# Patient Record
Sex: FEMALE | Race: WHITE | NOT HISPANIC OR LATINO | Employment: OTHER | URBAN - METROPOLITAN AREA
[De-identification: names, ages, dates, MRNs, and addresses within clinical notes are randomized per-mention and may not be internally consistent; named-entity substitution may affect disease eponyms.]

---

## 2017-02-02 ENCOUNTER — HOSPITAL ENCOUNTER (OUTPATIENT)
Dept: ULTRASOUND IMAGING | Facility: CLINIC | Age: 77
Discharge: HOME | End: 2017-02-02
Payer: MEDICARE

## 2017-02-02 ENCOUNTER — HOSPITAL ENCOUNTER (OUTPATIENT)
Dept: MAMMOGRAPHY | Facility: CLINIC | Age: 77
Discharge: HOME | End: 2017-02-02
Payer: MEDICARE

## 2017-02-02 DIAGNOSIS — N63.0 BREAST LUMP: ICD-10-CM

## 2017-02-02 DIAGNOSIS — C50.912 MALIGNANT NEOPLASM OF LEFT FEMALE BREAST (HCC): ICD-10-CM

## 2017-02-02 PROCEDURE — 76642 ULTRASOUND BREAST LIMITED: CPT

## 2017-02-02 PROCEDURE — G0204 DX MAMMO INCL CAD BI: HCPCS

## 2017-02-15 ENCOUNTER — ALLSCRIPTS OFFICE VISIT (OUTPATIENT)
Dept: OTHER | Facility: OTHER | Age: 77
End: 2017-02-15

## 2017-02-15 PROCEDURE — G0145 SCR C/V CYTO,THINLAYER,RESCR: HCPCS | Performed by: OBSTETRICS & GYNECOLOGY

## 2017-02-17 ENCOUNTER — LAB REQUISITION (OUTPATIENT)
Dept: LAB | Facility: HOSPITAL | Age: 77
End: 2017-02-17
Payer: MEDICARE

## 2017-02-17 DIAGNOSIS — Z01.419 ENCOUNTER FOR GYNECOLOGICAL EXAMINATION WITHOUT ABNORMAL FINDING: ICD-10-CM

## 2017-02-22 LAB
LAB AP GYN PRIMARY INTERPRETATION: NORMAL
Lab: NORMAL

## 2017-02-23 ENCOUNTER — ALLSCRIPTS OFFICE VISIT (OUTPATIENT)
Dept: OTHER | Facility: OTHER | Age: 77
End: 2017-02-23

## 2017-02-23 ENCOUNTER — TRANSCRIBE ORDERS (OUTPATIENT)
Dept: ADMINISTRATIVE | Facility: HOSPITAL | Age: 77
End: 2017-02-23

## 2017-02-23 DIAGNOSIS — Z12.31 OTHER SCREENING MAMMOGRAM: Primary | ICD-10-CM

## 2017-02-24 ENCOUNTER — GENERIC CONVERSION - ENCOUNTER (OUTPATIENT)
Dept: OTHER | Facility: OTHER | Age: 77
End: 2017-02-24

## 2017-05-10 ENCOUNTER — TRANSCRIBE ORDERS (OUTPATIENT)
Dept: LAB | Facility: CLINIC | Age: 77
End: 2017-05-10

## 2017-05-10 ENCOUNTER — APPOINTMENT (OUTPATIENT)
Dept: LAB | Facility: CLINIC | Age: 77
End: 2017-05-10
Payer: MEDICARE

## 2017-05-10 DIAGNOSIS — Z79.4 DIABETES MELLITUS DUE TO UNDERLYING CONDITION WITH HYPEROSMOLARITY AND COMA, WITH LONG-TERM CURRENT USE OF INSULIN (HCC): ICD-10-CM

## 2017-05-10 DIAGNOSIS — E78.2 MIXED HYPERLIPIDEMIA: ICD-10-CM

## 2017-05-10 DIAGNOSIS — E08.01 DIABETES MELLITUS DUE TO UNDERLYING CONDITION WITH HYPEROSMOLARITY AND COMA, WITH LONG-TERM CURRENT USE OF INSULIN (HCC): ICD-10-CM

## 2017-05-10 DIAGNOSIS — E03.9 UNSPECIFIED HYPOTHYROIDISM: Primary | ICD-10-CM

## 2017-05-10 DIAGNOSIS — M06.041 SERONEGATIVE RHEUMATOID ARTHRITIS OF RIGHT HAND (HCC): ICD-10-CM

## 2017-05-10 LAB
ALBUMIN SERPL BCP-MCNC: 3.7 G/DL (ref 3.5–5)
ALP SERPL-CCNC: 107 U/L (ref 46–116)
ALT SERPL W P-5'-P-CCNC: 73 U/L (ref 12–78)
ANION GAP SERPL CALCULATED.3IONS-SCNC: 9 MMOL/L (ref 4–13)
AST SERPL W P-5'-P-CCNC: 62 U/L (ref 5–45)
BASOPHILS # BLD AUTO: 0.03 THOUSANDS/ΜL (ref 0–0.1)
BASOPHILS NFR BLD AUTO: 0 % (ref 0–1)
BILIRUB SERPL-MCNC: 0.46 MG/DL (ref 0.2–1)
BUN SERPL-MCNC: 14 MG/DL (ref 5–25)
CALCIUM SERPL-MCNC: 9.9 MG/DL (ref 8.3–10.1)
CHLORIDE SERPL-SCNC: 104 MMOL/L (ref 100–108)
CHOLEST SERPL-MCNC: 186 MG/DL (ref 50–200)
CO2 SERPL-SCNC: 29 MMOL/L (ref 21–32)
CREAT SERPL-MCNC: 0.75 MG/DL (ref 0.6–1.3)
CREAT UR-MCNC: 212 MG/DL
CRP SERPL QL: 15.5 MG/L
EOSINOPHIL # BLD AUTO: 0.13 THOUSAND/ΜL (ref 0–0.61)
EOSINOPHIL NFR BLD AUTO: 2 % (ref 0–6)
ERYTHROCYTE [DISTWIDTH] IN BLOOD BY AUTOMATED COUNT: 15.8 % (ref 11.6–15.1)
ERYTHROCYTE [SEDIMENTATION RATE] IN BLOOD: 26 MM/HOUR (ref 0–20)
EST. AVERAGE GLUCOSE BLD GHB EST-MCNC: 140 MG/DL
GFR SERPL CREATININE-BSD FRML MDRD: >60 ML/MIN/1.73SQ M
GLUCOSE P FAST SERPL-MCNC: 111 MG/DL (ref 65–99)
HBA1C MFR BLD: 6.5 % (ref 4.2–6.3)
HCT VFR BLD AUTO: 40.7 % (ref 34.8–46.1)
HDLC SERPL-MCNC: 61 MG/DL (ref 40–60)
HGB BLD-MCNC: 13.3 G/DL (ref 11.5–15.4)
LDLC SERPL CALC-MCNC: 89 MG/DL (ref 0–100)
LYMPHOCYTES # BLD AUTO: 2.97 THOUSANDS/ΜL (ref 0.6–4.47)
LYMPHOCYTES NFR BLD AUTO: 40 % (ref 14–44)
MCH RBC QN AUTO: 30.8 PG (ref 26.8–34.3)
MCHC RBC AUTO-ENTMCNC: 32.7 G/DL (ref 31.4–37.4)
MCV RBC AUTO: 94 FL (ref 82–98)
MICROALBUMIN UR-MCNC: 24.6 MG/L (ref 0–20)
MICROALBUMIN/CREAT 24H UR: 12 MG/G CREATININE (ref 0–30)
MONOCYTES # BLD AUTO: 0.49 THOUSAND/ΜL (ref 0.17–1.22)
MONOCYTES NFR BLD AUTO: 7 % (ref 4–12)
NEUTROPHILS # BLD AUTO: 3.78 THOUSANDS/ΜL (ref 1.85–7.62)
NEUTS SEG NFR BLD AUTO: 51 % (ref 43–75)
NRBC BLD AUTO-RTO: 0 /100 WBCS
PLATELET # BLD AUTO: 326 THOUSANDS/UL (ref 149–390)
PMV BLD AUTO: 10 FL (ref 8.9–12.7)
POTASSIUM SERPL-SCNC: 3.3 MMOL/L (ref 3.5–5.3)
PROT SERPL-MCNC: 7.4 G/DL (ref 6.4–8.2)
RBC # BLD AUTO: 4.32 MILLION/UL (ref 3.81–5.12)
SODIUM SERPL-SCNC: 142 MMOL/L (ref 136–145)
TRIGL SERPL-MCNC: 178 MG/DL
TSH SERPL DL<=0.05 MIU/L-ACNC: 2.11 UIU/ML (ref 0.36–3.74)
WBC # BLD AUTO: 7.42 THOUSAND/UL (ref 4.31–10.16)

## 2017-05-10 PROCEDURE — 82570 ASSAY OF URINE CREATININE: CPT

## 2017-05-10 PROCEDURE — 86140 C-REACTIVE PROTEIN: CPT

## 2017-05-10 PROCEDURE — 85025 COMPLETE CBC W/AUTO DIFF WBC: CPT

## 2017-05-10 PROCEDURE — 84443 ASSAY THYROID STIM HORMONE: CPT

## 2017-05-10 PROCEDURE — 85652 RBC SED RATE AUTOMATED: CPT

## 2017-05-10 PROCEDURE — 36415 COLL VENOUS BLD VENIPUNCTURE: CPT

## 2017-05-10 PROCEDURE — 83036 HEMOGLOBIN GLYCOSYLATED A1C: CPT

## 2017-05-10 PROCEDURE — 80053 COMPREHEN METABOLIC PANEL: CPT

## 2017-05-10 PROCEDURE — 82043 UR ALBUMIN QUANTITATIVE: CPT

## 2017-05-10 PROCEDURE — 80061 LIPID PANEL: CPT

## 2017-12-19 ENCOUNTER — TRANSCRIBE ORDERS (OUTPATIENT)
Dept: ADMINISTRATIVE | Facility: HOSPITAL | Age: 77
End: 2017-12-19

## 2017-12-19 DIAGNOSIS — R74.8 ABNORMAL LIVER ENZYMES: Primary | ICD-10-CM

## 2017-12-27 ENCOUNTER — HOSPITAL ENCOUNTER (OUTPATIENT)
Dept: RADIOLOGY | Facility: HOSPITAL | Age: 77
Discharge: HOME | End: 2017-12-27
Attending: INTERNAL MEDICINE
Payer: MEDICARE

## 2017-12-27 ENCOUNTER — GENERIC CONVERSION - ENCOUNTER (OUTPATIENT)
Dept: OTHER | Facility: OTHER | Age: 77
End: 2017-12-27

## 2017-12-27 DIAGNOSIS — R74.8 ABNORMAL LIVER ENZYMES: ICD-10-CM

## 2017-12-27 PROCEDURE — 76705 ECHO EXAM OF ABDOMEN: CPT

## 2018-01-12 VITALS
DIASTOLIC BLOOD PRESSURE: 82 MMHG | OXYGEN SATURATION: 99 % | RESPIRATION RATE: 15 BRPM | SYSTOLIC BLOOD PRESSURE: 138 MMHG | HEART RATE: 90 BPM | TEMPERATURE: 98.8 F | WEIGHT: 220 LBS | BODY MASS INDEX: 40.48 KG/M2 | HEIGHT: 62 IN

## 2018-01-13 VITALS
SYSTOLIC BLOOD PRESSURE: 138 MMHG | DIASTOLIC BLOOD PRESSURE: 74 MMHG | HEIGHT: 62 IN | WEIGHT: 220 LBS | BODY MASS INDEX: 40.48 KG/M2

## 2018-01-16 NOTE — RESULT NOTES
Verified Results  (1) THIN PREP PAP WITH IMAGING [id number] 12:00AM Colin Sánchez     Test Name Result Flag Reference   LAB AP CASE REPORT (Report)     Gynecologic Cytology Report            Case: [id number]                  Authorizing Provider: Dave Stark MD     Collected:      02/15/2017           First Screen:     VALERY Shanks   Received:      02/20/2017 1300        Specimen:  LIQUID-BASED PAP, SCREENING, Vaginal   LAB AP GYN PRIMARY INTERPRETATION      Negative for intraepithelial lesion or malignancy  Electronically signed by VALERY Shanks on 2/22/2017 at 4:25 PM   LAB AP GYN SPECIMEN ADEQUACY      Satisfactory for evaluation  LAB AP GYN ADDITIONAL INFORMATION (Report)     [employer]'s FDA approved ,  and ThinPrep Imaging System are   utilized with strict adherence to the 's instruction manual to   prepare gynecologic and non-gynecologic cytology specimens for the   production of ThinPrep slides as well as for gynecologic ThinPrep imaging  These processes have been validated by our laboratory and/or by the     The Pap test is not a diagnostic procedure and should not be used as the   sole means to detect cervical cancer  It is only a screening procedure to   aid in the detection of cervical cancer and its precursors  Both   false-negative and false-positive results have been experienced  Your   patient's test result should be interpreted in this context together with   the history and clinical findings     LAB AP LMP

## 2018-02-02 DIAGNOSIS — Z12.39 ENCOUNTER FOR OTHER SCREENING FOR MALIGNANT NEOPLASM OF BREAST: ICD-10-CM

## 2018-02-02 DIAGNOSIS — Z12.31 ENCOUNTER FOR SCREENING MAMMOGRAM FOR MALIGNANT NEOPLASM OF BREAST: ICD-10-CM

## 2018-02-05 ENCOUNTER — HOSPITAL ENCOUNTER (OUTPATIENT)
Dept: MAMMOGRAPHY | Facility: HOSPITAL | Age: 78
Discharge: HOME | End: 2018-02-05
Attending: SURGERY
Payer: MEDICARE

## 2018-02-05 DIAGNOSIS — Z12.39 ENCOUNTER FOR OTHER SCREENING FOR MALIGNANT NEOPLASM OF BREAST: ICD-10-CM

## 2018-02-05 PROCEDURE — 77067 SCR MAMMO BI INCL CAD: CPT

## 2018-02-22 ENCOUNTER — OFFICE VISIT (OUTPATIENT)
Dept: SURGICAL ONCOLOGY | Facility: CLINIC | Age: 78
End: 2018-02-22
Payer: MEDICARE

## 2018-02-22 VITALS
HEIGHT: 62 IN | RESPIRATION RATE: 18 BRPM | WEIGHT: 193.5 LBS | DIASTOLIC BLOOD PRESSURE: 84 MMHG | HEART RATE: 84 BPM | SYSTOLIC BLOOD PRESSURE: 122 MMHG | BODY MASS INDEX: 35.61 KG/M2

## 2018-02-22 DIAGNOSIS — Z12.31 SCREENING MAMMOGRAM, ENCOUNTER FOR: ICD-10-CM

## 2018-02-22 DIAGNOSIS — C50.912 ADENOCARCINOMA OF LEFT BREAST (HCC): Primary | ICD-10-CM

## 2018-02-22 PROBLEM — Z79.811 USE OF ANASTROZOLE (ARIMIDEX): Status: RESOLVED | Noted: 2018-02-22 | Resolved: 2018-02-22

## 2018-02-22 PROBLEM — Z79.811 USE OF LETROZOLE (FEMARA): Status: RESOLVED | Noted: 2018-02-22 | Resolved: 2018-02-22

## 2018-02-22 PROCEDURE — 99214 OFFICE O/P EST MOD 30 MIN: CPT | Performed by: SURGERY

## 2018-02-22 NOTE — LETTER
February 22, 2018     [address] [place] [id number]    Patient: Janelle Montesinos   YOB: 1940   Date of Visit: 2/22/2018       Dear Dr Gildardo Gonzalez:    Thank you for referring Carlus Dancer to me for evaluation  Below are my notes for this consultation  If you have questions, please do not hesitate to call me  I look forward to following your patient along with you  Sincerely,        Soham Agosto MD        CC: No Recipients  Soham Agosto MD  2/22/2018  2:33 PM  Sign at close encounter     Surgical Oncology Follow Up       [address] [place] [address]  1940  [id number]  [address]  [facility] [address] [address]    Chief Complaint   Patient presents with    Follow-up     yearly f/u        Assessment/Plan     Advance Care Planning/Advance Directives:  Did not discuss  with the patient  Adenocarcinoma of breast ([facility] )    1/11/2012 Initial Diagnosis     Adenocarcinoma of breast ([facility] )       1/16/2012 Surgery     Left breast biopsy , IDC         2/17/2012 Surgery     Left partial mastectomy, SLNB         3/19/2012 -  Radiation     PBRT  Dr Sander Tapia,  Dr Ray Butt         4/2012 -  Hormone Therapy     Anastrazole, Letrazole  Dr Martinez Soldcarlos  Pt d/c due to joint aches  History of Present Illness: breast cancer follow up  -Interval History:n/a    Review of Systems:  Review of Systems   Constitutional: Negative  Negative for appetite change and fever  Eyes: Negative  Respiratory: Negative for shortness of breath  Cardiovascular: Negative  Gastrointestinal: Negative  Endocrine: Negative  Genitourinary: Negative  Musculoskeletal: Negative  Negative for arthralgias and myalgias  Skin: Negative  Allergic/Immunologic: Negative  Neurological: Negative      Hematological: Negative  Negative for adenopathy  Does not bruise/bleed easily  Psychiatric/Behavioral: Negative  Patient Active Problem List   Diagnosis    Ad[facility] breast ([facility] )    Hx of aromatase inhibitor therapy     Past Medical History:   Diagnosis Date    Arthritis     Diabetes mellitus ([facility] )     Disease of thyroid gland     GERD (gastroesophageal reflux disease)     Headache     Hemorrhoids     Hx of radiation therapy 03/09/2012    PBRT    Hypertension     Hypertension     Night sweats     Rheumatoid arthritis (HCC)     Trigger finger     right and left hand    Urinary incontinence     Use of anastrozole (Arimidex)     Use of letrozole (Femara)      Past Surgical History:   Procedure Laterality Date    ABDOMINAL SURGERY      APPENDECTOMY      BREAST BIOPSY Left 01/162012    IDC    BREAST LUMPECTOMY Left 02/17/2012    BREAST SURGERY      CATARACT EXTRACTION      CHOLECYSTECTOMY      FOOT SURGERY      right and left foot    HYSTERECTOMY      JOINT REPLACEMENT      KNEE SURGERY      MASTECTOMY      left breast partial mastectomy    REPAIR RECTOCELE      TONSILLECTOMY      TUBAL LIGATION       Family History   Problem Relation Age of Onset    Prostate cancer Father      age at dx unk    Prostate cancer Brother      age at dx ink    Thyroid cancer Brother      age at dx unk    Thyroid cancer Daughter      age at dx unk    Pancreatic cancer Maternal Aunt      age at dx unk    Breast cancer Maternal Aunt      age at dx unk    Lung cancer Maternal Uncle      age at dx unk     Social History     Social History    Marital status: /Civil Union     Spouse name: N/A    Number of children: N/A    Years of education: N/A     Occupational History    Not on file       Social History Main Topics    Smoking status: Never Smoker    Smokeless tobacco: Never Used    Alcohol use Yes    Drug use: No    Sexual activity: Not on file     Other Topics Concern    Not on file Social History Narrative    No narrative on file       Current Outpatient Prescriptions:     amLODIPine (NORVASC) 10 mg tablet, Take 10 mg by mouth daily  , Disp: , Rfl:     calcium citrate-vitamin D (CITRACAL+D) 315-200 MG-UNIT per tablet, Take 1 tablet by mouth daily  , Disp: , Rfl:     Cholecalciferol (D3 ADULT PO), Take 2,000 Units by mouth daily  , Disp: , Rfl:     folic acid (FOLVITE) 1 mg tablet, Take 1 mg by mouth daily  , Disp: , Rfl:     furosemide (LASIX) 40 mg tablet, Take 40 mg by mouth daily  , Disp: , Rfl:     levothyroxine 75 mcg tablet, Take 88 mcg by mouth daily  , Disp: , Rfl:     losartan (COZAAR) 100 MG tablet, Take 100 mg by mouth daily  , Disp: , Rfl:     metFORMIN (GLUCOPHAGE) 1000 MG tablet, Take 1,000 mg by mouth 2 (two) times a day with meals  , Disp: , Rfl:     methotrexate 2 5 mg tablet, Take 20 mg by mouth once a week , Disp: , Rfl:     Multiple Vitamin (MULTIVITAMIN) tablet, Take 1 tablet by mouth daily  , Disp: , Rfl:   Allergies   Allergen Reactions    Penicillins     Sulfa Antibiotics        The following portions of the patient's history were reviewed and updated as appropriate: allergies, current medications, past family history, past medical history, past social history, past surgical history and problem list         Vitals:    02/22/18 1408   BP: 122/84   Pulse: 84   Resp: 18       Physical Exam   Constitutional: She is oriented to person, place, and time  She appears well-developed and well-nourished  HENT:   Head: Normocephalic and atraumatic  Cardiovascular: Normal heart sounds  Pulmonary/Chest: Breath sounds normal  Right breast exhibits no inverted nipple, no mass, no nipple discharge, no skin change and no tenderness  Left breast exhibits skin change (lumpectomy scar) and tenderness  Left breast exhibits no inverted nipple, no mass and no nipple discharge  Abdominal: Soft     Lymphadenopathy:        Right axillary: No pectoral and no lateral adenopathy present  Left axillary: No pectoral and no lateral adenopathy present  Right: No supraclavicular adenopathy present  Left: No supraclavicular adenopathy present  Neurological: She is alert and oriented to person, place, and time  Psychiatric: She has a normal mood and affect  Results:    Imaging  02/05/2018 bilateral screening mammogram is benign BI-RADS two with a density category of two    I reviewed the above  imaging data  Discussion/Summary:  27-year-old female status post left breast conservation for stage IA carcinoma  There is no evidence of disease based on examination today or recent mammogram   I will continue to see her on an annual basis or sooner should the need arise

## 2018-02-22 NOTE — PROGRESS NOTES
Surgical Oncology Follow Up       [address] [facility],[address] [place] [address]  [facility] [address] [place] [address]  1940  [id number]  [address]  [facility] [place] [id number]    Chief Complaint   Patient presents with    Follow-up     yearly f/u        Assessment/Plan     Advance Care Planning/Advance Directives:  Did not discuss  with the patient  Adenocarcinoma of breast ([place] [address] )    1/11/2012 Initial Diagnosis     Adenocarcinoma of breast ([place] [address] )       1/16/2012 Surgery     Left breast biopsy , IDC         2/17/2012 Surgery     Left partial mastectomy, SLNB         3/19/2012 -  Radiation     PBRT  Dr Judith Mejia,  Dr Julia Hearn         4/2012 -  Hormone Therapy     Anastrazole, Letrazole  Dr Felicitas So  Pt d/c due to joint aches  History of Present Illness: breast cancer follow up  -Interval History:n/a    Review of Systems:  Review of Systems   Constitutional: Negative  Negative for appetite change and fever  Eyes: Negative  Respiratory: Negative for shortness of breath  Cardiovascular: Negative  Gastrointestinal: Negative  Endocrine: Negative  Genitourinary: Negative  Musculoskeletal: Negative  Negative for arthralgias and myalgias  Skin: Negative  Allergic/Immunologic: Negative  Neurological: Negative  Hematological: Negative  Negative for adenopathy  Does not bruise/bleed easily  Psychiatric/Behavioral: Negative          Patient Active Problem List   Diagnosis    Adenocarcinoma of breast ([place] [address] )    Hx of aromatase inhibitor therapy     Past Medical History:   Diagnosis Date    Arthritis     Diabetes mellitus ([place] [address] )     Disease of thyroid gland     GERD (gastroesophageal reflux disease)     Headache     Hemorrhoids     Hx of radiation therapy 03/09/2012    PBRT    Hypertension     Hypertension     Night sweats     Rheumatoid arthritis ([facility] )     Trigger finger     right and left hand    Urinary incontinence     Use of anastrozole (Arimidex)     Use of letrozole (Femara)      Past Surgical History:   Procedure Laterality Date    ABDOMINAL SURGERY      APPENDECTOMY      BREAST BIOPSY Left 01/162012    IDC    BREAST LUMPECTOMY Left 02/17/2012    BREAST SURGERY      CATARACT EXTRACTION      CHOLECYSTECTOMY      FOOT SURGERY      right and left foot    HYSTERECTOMY      JOINT REPLACEMENT      KNEE SURGERY      MASTECTOMY      left breast partial mastectomy    REPAIR RECTOCELE      TONSILLECTOMY      TUBAL LIGATION       Family History   Problem Relation Age of Onset    Prostate cancer Father      age at dx unk    Prostate cancer Brother      age at dx ink    Thyroid cancer Brother      age at dx unk    Thyroid cancer Daughter      age at dx unk    Pancreatic cancer Maternal Aunt      age at dx unk   Onur Lemme Breast cancer Maternal Aunt      age at dx unk    Lung cancer Maternal Uncle      age at dx unk     Social History     Social History    Marital status: /Civil Union     Spouse name: N/A    Number of children: N/A    Years of education: N/A     Occupational History    Not on file  Social History Main Topics    Smoking status: Never Smoker    Smokeless tobacco: Never Used    Alcohol use Yes    Drug use: No    Sexual activity: Not on file     Other Topics Concern    Not on file     Social History Narrative    No narrative on file       Current Outpatient Prescriptions:     amLODIPine (NORVASC) 10 mg tablet, Take 10 mg by mouth daily  , Disp: , Rfl:     calcium citrate-vitamin D (CITRACAL+D) 315-200 MG-UNIT per tablet, Take 1 tablet by mouth daily  , Disp: , Rfl:     Cholecalciferol (D3 ADULT PO), Take 2,000 Units by mouth daily  , Disp: , Rfl:     folic acid (FOLVITE) 1 mg tablet, Take 1 mg by mouth daily  , Disp: , Rfl:     furosemide (LASIX) 40 mg tablet, Take 40 mg by mouth daily  , Disp: , Rfl:    levothyroxine 75 mcg tablet, Take 88 mcg by mouth daily  , Disp: , Rfl:     losartan (COZAAR) 100 MG tablet, Take 100 mg by mouth daily  , Disp: , Rfl:     metFORMIN (GLUCOPHAGE) 1000 MG tablet, Take 1,000 mg by mouth 2 (two) times a day with meals  , Disp: , Rfl:     methotrexate 2 5 mg tablet, Take 20 mg by mouth once a week , Disp: , Rfl:     Multiple Vitamin (MULTIVITAMIN) tablet, Take 1 tablet by mouth daily  , Disp: , Rfl:   Allergies   Allergen Reactions    Penicillins     Sulfa Antibiotics        The following portions of the patient's history were reviewed and updated as appropriate: allergies, current medications, past family history, past medical history, past social history, past surgical history and problem list         Vitals:    02/22/18 1408   BP: 122/84   Pulse: 84   Resp: 18       Physical Exam   Constitutional: She is oriented to person, place, and time  She appears well-developed and well-nourished  HENT:   Head: Normocephalic and atraumatic  Cardiovascular: Normal heart sounds  Pulmonary/Chest: Breath sounds normal  Right breast exhibits no inverted nipple, no mass, no nipple discharge, no skin change and no tenderness  Left breast exhibits skin change (lumpectomy scar) and tenderness  Left breast exhibits no inverted nipple, no mass and no nipple discharge  Abdominal: Soft  Lymphadenopathy:        Right axillary: No pectoral and no lateral adenopathy present  Left axillary: No pectoral and no lateral adenopathy present  Right: No supraclavicular adenopathy present  Left: No supraclavicular adenopathy present  Neurological: She is alert and oriented to person, place, and time  Psychiatric: She has a normal mood and affect  Results:    Imaging  02/05/2018 bilateral screening mammogram is benign BI-RADS two with a density category of two    I reviewed the above  imaging data      Discussion/Summary:  78-year-old female status post left breast conservation for stage IA carcinoma  There is no evidence of disease based on examination today or recent mammogram   I will continue to see her on an annual basis or sooner should the need arise

## 2018-07-11 ENCOUNTER — OFFICE VISIT (OUTPATIENT)
Dept: OBGYN CLINIC | Facility: HOSPITAL | Age: 78
End: 2018-07-11
Payer: MEDICARE

## 2018-07-11 VITALS
SYSTOLIC BLOOD PRESSURE: 127 MMHG | BODY MASS INDEX: 38.02 KG/M2 | HEART RATE: 76 BPM | DIASTOLIC BLOOD PRESSURE: 76 MMHG | WEIGHT: 206.6 LBS | HEIGHT: 62 IN

## 2018-07-11 DIAGNOSIS — M65.341 TRIGGER FINGER, RIGHT RING FINGER: ICD-10-CM

## 2018-07-11 DIAGNOSIS — M65.322 TRIGGER FINGER, LEFT INDEX FINGER: Primary | ICD-10-CM

## 2018-07-11 PROCEDURE — 99214 OFFICE O/P EST MOD 30 MIN: CPT | Performed by: ORTHOPAEDIC SURGERY

## 2018-07-11 RX ORDER — LIDOCAINE HYDROCHLORIDE AND EPINEPHRINE 10; 10 MG/ML; UG/ML
10 INJECTION, SOLUTION INFILTRATION; PERINEURAL ONCE
Status: CANCELLED | OUTPATIENT
Start: 2018-07-11 | End: 2018-07-11

## 2018-07-11 NOTE — PROGRESS NOTES
ASSESSMENT/PLAN:    Assessment:   Trigger Finger  right  ring finger and left index trigger finger    Plan:   Trigger Finger Release  left  index finger, and right ring finger    Follow Up: After Surgery    To Do Next Visit:  Sutures out    General Discussions:       Operative Discussions:  Trigger Finger Release: The anatomy and physiology of trigger finger was discussed with the patient today in the office  Edema and increased contact pressure within the flexor tendons at the A1 pulley can cause pain, crepitation, and limitation of function  Treatment options include resting MP blocking splints to decrease edema, oral anti-inflammatory medications, home or formal therapy exercises, up to 2 steroid injections or surgical release  While majority of patients do respond to conservative treatment, up to 20% may require surgical release  The patient has elected release of the trigger finger  The patient has elected to undergo a release of the A1 pulley (trigger finger)  A small incision will be made over the palmar aspect of the hand, the tendon sheath holding the flexor tendons will be released  In the postoperative period, light activities are allowed immediately, driving is allowed when narcotic medication has stopped, and the incision may get wet after 2 days  Heavy activities (lifting more than approximately 10 pounds) will be allowed after the follow up appointment in 1-2 weeks  While the pain and discomfort within the wrist typically improves rapidly, some residual discomfort may be present for up to 6 weeks  The nodule that is typically palpable in the palmar aspect of the hand will not be removed, as this would necessitate removal of a portion of the flexor tendon, however the catching, clicking, and locking should resolve  Approximate success rate is 98%  The risks and benefits of the procedure were explained to the patient, which include, but are not limited to: Bleeding, infection, recurrence, pain, scar, damage to tendons, damage to nerves, and damage to blood vessels, need for future surgery and complications related to anesthesia  If bony work is done, risks also include malunion and nonunion  These risks, along with alternative conservative treatment options, and postoperative protocols were voiced back and understood by the patient  All questions were answered to the patient's satisfaction  The patient agrees to comply with a standard postoperative protocol, and is willing to proceed  Education was provided via written and auditory forms  There were no barriers to learning  Written handouts regarding wound care, incision and scar care, and general preoperative information, as well as risks and benefits were provided to the patient       _____________________________________________________  CHIEF COMPLAINT:  Chief Complaint   Patient presents with    Right Ring Finger - Locking, Pain    Left Index Finger - Locking, Pain         SUBJECTIVE:  Shayan Adler is a 77y o  year old female who presents for follow up regarding her B/L hands due to triggering at her right ring and left index fingers  Her fingers lock at times and are very painful  She had trigger finger releases at her left small, thumb, and ring fingers about 4 yrs ago with resolution of those symptoms  Denies any numbness/tingling      PAST MEDICAL HISTORY:  Past Medical History:   Diagnosis Date    Arthritis     Diabetes mellitus ([address] )     Disease of thyroid gland     GERD (gastroesophageal reflux disease)     Headache     Hemorrhoids     Hx of radiation therapy 03/09/2012    PBRT    Hypertension     Hypertension     Night sweats     Rheumatoid arthritis (HCC)     Trigger finger     right and left hand    Urinary incontinence     Use of anastrozole (Arimidex)     Use of letrozole (Femara)        PAST SURGICAL HISTORY:  Past Surgical History:   Procedure Laterality Date    ABDOMINAL SURGERY      APPENDECTOMY  BREAST BIOPSY Left 01/162012    IDC    BREAST LUMPECTOMY Left 02/17/2012    BREAST SURGERY      CATARACT EXTRACTION      CHOLECYSTECTOMY      FOOT SURGERY      right and left foot    HYSTERECTOMY      JOINT REPLACEMENT      KNEE SURGERY      MASTECTOMY      left breast partial mastectomy    REPAIR RECTOCELE      TONSILLECTOMY      TUBAL LIGATION         FAMILY HISTORY:  Family History   Problem Relation Age of Onset    Prostate cancer Father         age at dx unk    Prostate cancer Brother         age at dx ink    Thyroid cancer Brother         age at dx unk    Thyroid cancer Daughter         age at dx unk    Pancreatic cancer Maternal Aunt         age at dx unk    Breast cancer Maternal Aunt         age at dx unk    Lung cancer Maternal Uncle         age at dx unk       SOCIAL HISTORY:  Social History   Substance Use Topics    Smoking status: Never Smoker    Smokeless tobacco: Never Used    Alcohol use Yes       MEDICATIONS:    Current Outpatient Prescriptions:     amLODIPine (NORVASC) 10 mg tablet, Take 10 mg by mouth daily  , Disp: , Rfl:     calcium citrate-vitamin D (CITRACAL+D) 315-200 MG-UNIT per tablet, Take 1 tablet by mouth daily  , Disp: , Rfl:     Cholecalciferol (D3 ADULT PO), Take 2,000 Units by mouth daily  , Disp: , Rfl:     folic acid (FOLVITE) 1 mg tablet, Take 1 mg by mouth daily  , Disp: , Rfl:     furosemide (LASIX) 40 mg tablet, Take 40 mg by mouth daily  , Disp: , Rfl:     levothyroxine 75 mcg tablet, Take 88 mcg by mouth daily  , Disp: , Rfl:     losartan (COZAAR) 100 MG tablet, Take 100 mg by mouth daily  , Disp: , Rfl:     metFORMIN (GLUCOPHAGE) 1000 MG tablet, Take 1,000 mg by mouth 2 (two) times a day with meals  , Disp: , Rfl:     methotrexate 2 5 mg tablet, Take 20 mg by mouth once a week , Disp: , Rfl:     Multiple Vitamin (MULTIVITAMIN) tablet, Take 1 tablet by mouth daily  , Disp: , Rfl:     ALLERGIES:  Allergies   Allergen Reactions    Penicillins     Sulfa Antibiotics        REVIEW OF SYSTEMS:  Pertinent items are noted in HPI  A comprehensive review of systems was negative      LABS:  HgA1c:   Lab Results   Component Value Date    HGBA1C 6 5 (H) 05/10/2017     BMP:   Lab Results   Component Value Date    CALCIUM 9 9 05/10/2017     05/10/2017    K 3 3 (L) 05/10/2017    CO2 29 05/10/2017     05/10/2017    BUN 14 05/10/2017    CREATININE 0 75 05/10/2017           _____________________________________________________  PHYSICAL EXAMINATION:  General: well developed and well nourished, alert, oriented times 3 and appears comfortable  Psychiatric: Normal  HEENT: Trachea Midline, No torticollis  Cardiovascular: No discernable arrhythmia  Pulmonary: No wheezing or stridor  Skin: No masses, erthema, lacerations, fluctation, ulcerations  Neurovascular: Sensation Intact to the Median, Ulnar, Radial Nerve, Motor Intact to the Median, Ulnar, Radial Nerve and Pulses Intact    MUSCULOSKELETAL EXAMINATION:  LEFT SIDE:  Finger:  Tenderness index finger, Triggering  index finger and Nodules  index finger  RIGHT SIDE:  Finger:  Tenderness ring, Triggering  ring finger and Nodules  index finger   She lacks terminal flexion at her left  Index finger by 3 cm and right ring finger by 4cm  NVID    _____________________________________________________  STUDIES REVIEWED:  No Studies to review      PROCEDURES PERFORMED:  Procedures  No Procedures performed today   Scribe Attestation    I,:   Michelle Perry am acting as a scribe while in the presence of the attending physician :        I,:   Alcon Schmid MD personally performed the services described in this documentation    as scribed in my presence :

## 2018-07-11 NOTE — H&P
ASSESSMENT/PLAN:    Assessment:   Trigger Finger  right  ring finger and left index trigger finger    Plan:   Trigger Finger Release  left  index finger, and right ring finger    Follow Up: After Surgery    To Do Next Visit:  Sutures out    General Discussions:       Operative Discussions:  Trigger Finger Release: The anatomy and physiology of trigger finger was discussed with the patient today in the office  Edema and increased contact pressure within the flexor tendons at the A1 pulley can cause pain, crepitation, and limitation of function  Treatment options include resting MP blocking splints to decrease edema, oral anti-inflammatory medications, home or formal therapy exercises, up to 2 steroid injections or surgical release  While majority of patients do respond to conservative treatment, up to 20% may require surgical release  The patient has elected release of the trigger finger  The patient has elected to undergo a release of the A1 pulley (trigger finger)  A small incision will be made over the palmar aspect of the hand, the tendon sheath holding the flexor tendons will be released  In the postoperative period, light activities are allowed immediately, driving is allowed when narcotic medication has stopped, and the incision may get wet after 2 days  Heavy activities (lifting more than approximately 10 pounds) will be allowed after the follow up appointment in 1-2 weeks  While the pain and discomfort within the wrist typically improves rapidly, some residual discomfort may be present for up to 6 weeks  The nodule that is typically palpable in the palmar aspect of the hand will not be removed, as this would necessitate removal of a portion of the flexor tendon, however the catching, clicking, and locking should resolve  Approximate success rate is 98%  The risks and benefits of the procedure were explained to the patient, which include, but are not limited to: Bleeding, infection, recurrence, pain, scar, damage to tendons, damage to nerves, and damage to blood vessels, need for future surgery and complications related to anesthesia  If bony work is done, risks also include malunion and nonunion  These risks, along with alternative conservative treatment options, and postoperative protocols were voiced back and understood by the patient  All questions were answered to the patient's satisfaction  The patient agrees to comply with a standard postoperative protocol, and is willing to proceed  Education was provided via written and auditory forms  There were no barriers to learning  Written handouts regarding wound care, incision and scar care, and general preoperative information, as well as risks and benefits were provided to the patient       _____________________________________________________  CHIEF COMPLAINT:  Chief Complaint   Patient presents with    Right Ring Finger - Locking, Pain    Left Index Finger - Locking, Pain         SUBJECTIVE:  Howie Vergara is a 77y o  year old female who presents for follow up regarding her B/L hands due to triggering at her right ring and left index fingers  Her fingers lock at times and are very painful  She had trigger finger releases at her left small, thumb, and ring fingers about 4 yrs ago with resolution of those symptoms  Denies any numbness/tingling      PAST MEDICAL HISTORY:  Past Medical History:   Diagnosis Date    Arthritis     Diabetes mellitus ([address] )     Disease of thyroid gland     GERD (gastroesophageal reflux disease)     Headache     Hemorrhoids     Hx of radiation therapy 03/09/2012    PBRT    Hypertension     Hypertension     Night sweats     Rheumatoid arthritis (HCC)     Trigger finger     right and left hand    Urinary incontinence     Use of anastrozole (Arimidex)     Use of letrozole (Femara)        PAST SURGICAL HISTORY:  Past Surgical History:   Procedure Laterality Date    ABDOMINAL SURGERY      APPENDECTOMY  BRE[facility]SY Left 01/162012    [facility]    BREAST LUMPECTOMY Left 02/17/2012    BREAST SURGERY      CATARACT EXTRACTION      CHOLECYSTECTOMY      FOOT SURGERY      right and left foot    HYSTERECTOMY      JOINT REPLACEMENT      KNEE SURGERY      MASTECTOMY      left breast partial mastectomy    REPAIR RECTOCELE      TONSILLECTOMY      TUBAL LIGATION         FAMILY HISTORY:  Family History   Problem Relation Age of Onset    Prostate cancer Father         age at dx unk    Prostate cancer Brother         age at dx ink    Thyroid cancer Brother         age at dx unk    Thyroid cancer Daughter         age at dx unk    Pancreatic cancer Maternal Aunt         age at dx unk    Breast cancer Maternal Aunt         age at dx unk    Lung cancer Maternal Uncle         age at dx unk       SOCIAL HISTORY:  Social History   Substance Use Topics    Smoking status: Never Smoker    Smokeless tobacco: Never Used    Alcohol use Yes       MEDICATIONS:    Current Outpatient Prescriptions:     amLODIPine (NORVASC) 10 mg tablet, Take 10 mg by mouth daily  , Disp: , Rfl:     calcium citrate-vitamin D (CITRACAL+D) 315-200 MG-UNIT per tablet, Take 1 tablet by mouth daily  , Disp: , Rfl:     Cholecalciferol (D3 ADULT PO), Take 2,000 Units by mouth daily  , Disp: , Rfl:     folic acid (FOLVITE) 1 mg tablet, Take 1 mg by mouth daily  , Disp: , Rfl:     furosemide (LASIX) 40 mg tablet, Take 40 mg by mouth daily  , Disp: , Rfl:     levothyroxine 75 mcg tablet, Take 88 mcg by mouth daily  , Disp: , Rfl:     losartan (COZAAR) 100 MG tablet, Take 100 mg by mouth daily  , Disp: , Rfl:     metFORMIN (GLUCOPHAGE) 1000 MG tablet, Take 1,000 mg by mouth 2 (two) times a day with meals  , Disp: , Rfl:     methotrexate 2 5 mg tablet, Take 20 mg by mouth once a week , Disp: , Rfl:     Multiple Vitamin (MULTIVITAMIN) tablet, Take 1 tablet by mouth daily  , Disp: , Rfl:     ALLERGIES:  Allergies   Allergen Reactions    Penicillins     Sulfa Antibiotics        REVIEW OF SYSTEMS:  Pertinent items are noted in HPI  A comprehensive review of systems was negative      LABS:  HgA1c:   Lab Results   Component Value Date    HGBA1C 6 5 (H) 05/10/2017     BMP:   Lab Results   Component Value Date    CALCIUM 9 9 05/10/2017     05/10/2017    K 3 3 (L) 05/10/2017    CO2 29 05/10/2017     05/10/2017    BUN 14 05/10/2017    CREATININE 0 75 05/10/2017           _____________________________________________________  PHYSICAL EXAMINATION:  General: well developed and well nourished, alert, oriented times 3 and appears comfortable  Psychiatric: Normal  HEENT: Trachea Midline, No torticollis  Cardiovascular: No discernable arrhythmia  Pulmonary: No wheezing or stridor  Skin: No masses, erthema, lacerations, fluctation, ulcerations  Neurovascular: Sensation Intact to the Median, Ulnar, Radial Nerve, Motor Intact to the Median, Ulnar, Radial Nerve and Pulses Intact    MUSCULOSKELETAL EXAMINATION:  LEFT SIDE:  Finger:  Tenderness index finger, Triggering  index finger and Nodules  index finger  RIGHT SIDE:  Finger:  Tenderness ring, Triggering  ring finger and Nodules  index finger   She lacks terminal flexion at her left  Index finger by 3 cm and right ring finger by 4cm  NVID    _____________________________________________________  STUDIES REVIEWED:  No Studies to review      PROCEDURES PERFORMED:  Procedures  No Procedures performed today   Scribe Attestation    I,:   Joao Ramos am acting as a scribe while in the presence of the attending physician :        I,:   Rudolph Mayfield MD personally performed the services described in this documentation    as scribed in my presence :

## 2018-08-07 ENCOUNTER — HOSPITAL ENCOUNTER (OUTPATIENT)
Facility: HOSPITAL | Age: 78
Setting detail: OUTPATIENT SURGERY
Discharge: HOME | End: 2018-08-07
Attending: ORTHOPAEDIC SURGERY | Admitting: ORTHOPAEDIC SURGERY
Payer: MEDICARE

## 2018-08-07 VITALS
RESPIRATION RATE: 16 BRPM | HEART RATE: 87 BPM | SYSTOLIC BLOOD PRESSURE: 157 MMHG | HEIGHT: 62 IN | OXYGEN SATURATION: 98 % | TEMPERATURE: 98.5 F | WEIGHT: 206 LBS | BODY MASS INDEX: 37.91 KG/M2 | DIASTOLIC BLOOD PRESSURE: 73 MMHG

## 2018-08-07 DIAGNOSIS — M65.341 TRIGGER FINGER, RIGHT RING FINGER: Primary | ICD-10-CM

## 2018-08-07 DIAGNOSIS — M65.331 TRIGGER MIDDLE FINGER OF RIGHT HAND: ICD-10-CM

## 2018-08-07 LAB — GLUCOSE SERPL-MCNC: 108 MG/DL (ref 65–140)

## 2018-08-07 PROCEDURE — 82948 REAGENT STRIP/BLOOD GLUCOSE: CPT

## 2018-08-07 PROCEDURE — 26055 INCISE FINGER TENDON SHEATH: CPT | Performed by: ORTHOPAEDIC SURGERY

## 2018-08-07 RX ORDER — MAGNESIUM HYDROXIDE 1200 MG/15ML
LIQUID ORAL AS NEEDED
Status: DISCONTINUED | OUTPATIENT
Start: 2018-08-07 | End: 2018-08-07 | Stop reason: HOSPADM

## 2018-08-07 RX ORDER — NAPROXEN 500 MG/1
500 TABLET ORAL 2 TIMES DAILY WITH MEALS
Qty: 10 TABLET | Refills: 0 | Status: SHIPPED | OUTPATIENT
Start: 2018-08-07 | End: 2019-02-28 | Stop reason: HOSPADM

## 2018-08-07 RX ORDER — BUPIVACAINE HYDROCHLORIDE AND EPINEPHRINE 5; 5 MG/ML; UG/ML
10 INJECTION, SOLUTION EPIDURAL; INTRACAUDAL; PERINEURAL ONCE
Status: CANCELLED | OUTPATIENT
Start: 2018-08-07 | End: 2018-08-07

## 2018-08-07 RX ORDER — HYDROCODONE BITARTRATE AND ACETAMINOPHEN 5; 325 MG/1; MG/1
1 TABLET ORAL EVERY 6 HOURS PRN
Qty: 10 TABLET | Refills: 0 | Status: SHIPPED | OUTPATIENT
Start: 2018-08-07 | End: 2018-08-17

## 2018-08-07 RX ORDER — SENNOSIDES 8.6 MG
650 CAPSULE ORAL EVERY 8 HOURS
Qty: 15 TABLET | Refills: 0 | Status: SHIPPED | OUTPATIENT
Start: 2018-08-07

## 2018-08-07 RX ORDER — LIDOCAINE HYDROCHLORIDE AND EPINEPHRINE 10; 10 MG/ML; UG/ML
10 INJECTION, SOLUTION INFILTRATION; PERINEURAL ONCE
Status: DISCONTINUED | OUTPATIENT
Start: 2018-08-07 | End: 2018-08-07 | Stop reason: HOSPADM

## 2018-08-07 RX ADMIN — LIDOCAINE HYDROCHLORIDE,EPINEPHRINE BITARTRATE: 10; .01 INJECTION, SOLUTION INFILTRATION; PERINEURAL at 11:33

## 2018-08-07 NOTE — OP NOTE
OPERATIVE REPORT  PATIENT NAME: Shayan Adler  :  1940  MRN: [id number]  Pt Location: [facility]    SURGERY DATE: 18    Surgeon(s) and Role:     * Selina Ram MD - Primary     * Mariano Ornelas PA-C - Assisting    Pre-Op Diagnosis:  Trigger finger, right ring finger [M65 341]    Post-Op Diagnosis Codes:     * Trigger finger, right ring finger [M65 341]    Procedure(s):  RELEASE TRIGGER FINGER - LONG FINGER AND RING FINGER (Right)    Specimen(s):  * No orders in the log *    Estimated Blood Loss:   Minimal      Anesthesia Type:   Local    Operative Indications: The patient has a history of Trigger Finger  right  long finger, ring finger that was recalcitrant to conservative management  The decision was made to bring the patient to the operating room for Trigger Finger Release  right  long finger, ring finger  Risks of the procedure were explained which include, but are not limited to bleeding; infection; damage to nerves, arteries,veins, tendons; scar; pain; need for reoperation; failure to give desired result; and risks of anaesthesia  All questions were answered to satisfaction and they were willing to proceed  Operative Findings:  Right long and ring finger trigger finger    Complications:   None    Procedure and Technique:  After the patient, site, and procedure were identified, the patient was brought into the operating room in a supine position  Local anaesthesia was adminstered in the preoperative holding area  A tourniquet was not used  The  right upper extremity was then prepped and drapped in a normal, sterile, orthopedic fashion  After the patient, site, and procedure were once again identified, attention was turned to the right long finger  An incision was made over the flexor tendon sheath at the level of the A1 pulley  Dissection was carried out in-line with the flexor tendon sheath and the radial and ulnar digital artery and nerve were protected    The A1 pulley was identified at the base of the incision  Under direct visualization, the A1 pulley was divided along the midline in its entirety with care taken to avoid injury to the underlying tendon  The tendons were examined to ensure that no further catching, popping, clicking or locking occurred with motion of the finger  The above mentioned procedure was then replicated on the right ring finger  At the completion of the procedure, hemostasis was obtained with cautery and direct pressure  The wounds were copiously irrigated with sterile solution  The wounds were closed with Prolene  Sterile dressings were applied, including Xeroform, gauze, tweeners, webril, ACE  Please note, all sponge, needle, and instrument counts were correct prior to closure  Loupe magnification was utilized  The patient tolerated the procedure well       I was present for the entire procedure and A qualified resident physician was not available    Patient Disposition:  [facility] and hemodynamically stable    SIGNATURE: Zurdo Salazar MD  DATE: 08/07/18  TIME: 12:43 PM

## 2018-08-07 NOTE — H&P (VIEW-ONLY)
ASSESSMENT/PLAN:    Assessment:   Trigger Finger  right  ring finger and left index trigger finger    Plan:   Trigger Finger Release  left  index finger, and right ring finger    Follow Up: After Surgery    To Do Next Visit:  Sutures out    General Discussions:       Operative Discussions:  Trigger Finger Release: The anatomy and physiology of trigger finger was discussed with the patient today in the office  Edema and increased contact pressure within the flexor tendons at the A1 pulley can cause pain, crepitation, and limitation of function  Treatment options include resting MP blocking splints to decrease edema, oral anti-inflammatory medications, home or formal therapy exercises, up to 2 steroid injections or surgical release  While majority of patients do respond to conservative treatment, up to 20% may require surgical release  The patient has elected release of the trigger finger  The patient has elected to undergo a release of the A1 pulley (trigger finger)  A small incision will be made over the palmar aspect of the hand, the tendon sheath holding the flexor tendons will be released  In the postoperative period, light activities are allowed immediately, driving is allowed when narcotic medication has stopped, and the incision may get wet after 2 days  Heavy activities (lifting more than approximately 10 pounds) will be allowed after the follow up appointment in 1-2 weeks  While the pain and discomfort within the wrist typically improves rapidly, some residual discomfort may be present for up to 6 weeks  The nodule that is typically palpable in the palmar aspect of the hand will not be removed, as this would necessitate removal of a portion of the flexor tendon, however the catching, clicking, and locking should resolve  Approximate success rate is 98%  The risks and benefits of the procedure were explained to the patient, which include, but are not limited to: Bleeding, infection, recurrence, pain, scar, damage to tendons, damage to nerves, and damage to blood vessels, need for future surgery and complications related to anesthesia  If bony work is done, risks also include malunion and nonunion  These risks, along with alternative conservative treatment options, and postoperative protocols were voiced back and understood by the patient  All questions were answered to the patient's satisfaction  The patient agrees to comply with a standard postoperative protocol, and is willing to proceed  Education was provided via written and auditory forms  There were no barriers to learning  Written handouts regarding wound care, incision and scar care, and general preoperative information, as well as risks and benefits were provided to the patient       _____________________________________________________  CHIEF COMPLAINT:  Chief Complaint   Patient presents with    Right Ring Finger - Locking, Pain    Left Index Finger - Locking, Pain         SUBJECTIVE:  Monie Dillard is a 77y o  year old female who presents for follow up regarding her B/L hands due to triggering at her right ring and left index fingers  Her fingers lock at times and are very painful  She had trigger finger releases at her left small, thumb, and ring fingers about 4 yrs ago with resolution of those symptoms  Denies any numbness/tingling      PAST MEDICAL HISTORY:  Past Medical History:   Diagnosis Date    Arthritis     Diabetes mellitus ([address] )     Disease of thyroid gland     GERD (gastroesophageal reflux disease)     Headache     Hemorrhoids     Hx of radiation therapy 03/09/2012    PBRT    Hypertension     Hypertension     Night sweats     Rheumatoid arthritis (HCC)     Trigger finger     right and left hand    Urinary incontinence     Use of anastrozole (Arimidex)     Use of letrozole (Femara)        PAST SURGICAL HISTORY:  Past Surgical History:   Procedure Laterality Date    ABDOMINAL SURGERY      APPENDECTOMY  [facility]162012    [facility]    BREAST LUMPECTOMY Left 02/17/2012    BREAST SURGERY      CATARACT EXTRACTION      CHOLECYSTECTOMY      FOOT SURGERY      right and left foot    HYSTERECTOMY      JOINT REPLACEMENT      KNEE SURGERY      MASTECTOMY      left breast partial mastectomy    REPAIR RECTOCELE      TONSILLECTOMY      TUBAL LIGATION         FAMILY HISTORY:  Family History   Problem Relation Age of Onset    Prostate cancer Father         age at dx unk    Prostate cancer Brother         age at dx ink    Thyroid cancer Brother         age at dx unk    Thyroid cancer Daughter         age at dx unk    Pancreatic cancer Maternal Aunt         age at dx unk    Breast cancer Maternal Aunt         age at dx unk    Lung cancer Maternal Uncle         age at dx unk       SOCIAL HISTORY:  Social History   Substance Use Topics    Smoking status: Never Smoker    Smokeless tobacco: Never Used    Alcohol use Yes       MEDICATIONS:    Current Outpatient Prescriptions:     amLODIPine (NORVASC) 10 mg tablet, Take 10 mg by mouth daily  , Disp: , Rfl:     calcium citrate-vitamin D (CITRACAL+D) 315-200 MG-UNIT per tablet, Take 1 tablet by mouth daily  , Disp: , Rfl:     Cholecalciferol (D3 ADULT PO), Take 2,000 Units by mouth daily  , Disp: , Rfl:     folic acid (FOLVITE) 1 mg tablet, Take 1 mg by mouth daily  , Disp: , Rfl:     furosemide (LASIX) 40 mg tablet, Take 40 mg by mouth daily  , Disp: , Rfl:     levothyroxine 75 mcg tablet, Take 88 mcg by mouth daily  , Disp: , Rfl:     losartan (COZAAR) 100 MG tablet, Take 100 mg by mouth daily  , Disp: , Rfl:     metFORMIN (GLUCOPHAGE) 1000 MG tablet, Take 1,000 mg by mouth 2 (two) times a day with meals  , Disp: , Rfl:     methotrexate 2 5 mg tablet, Take 20 mg by mouth once a week , Disp: , Rfl:     Multiple Vitamin (MULTIVITAMIN) tablet, Take 1 tablet by mouth daily  , Disp: , Rfl:     ALLERGIES:  Allergies   Allergen Reactions    Penicillins     Sulfa Antibiotics        REVIEW OF SYSTEMS:  Pertinent items are noted in HPI  A comprehensive review of systems was negative      LABS:  HgA1c:   Lab Results   Component Value Date    HGBA1C 6 5 (H) 05/10/2017     BMP:   Lab Results   Component Value Date    CALCIUM 9 9 05/10/2017     05/10/2017    K 3 3 (L) 05/10/2017    CO2 29 05/10/2017     05/10/2017    BUN 14 05/10/2017    CREATININE 0 75 05/10/2017           _____________________________________________________  PHYSICAL EXAMINATION:  General: well developed and well nourished, alert, oriented times 3 and appears comfortable  Psychiatric: Normal  HEENT: Trachea Midline, No torticollis  Cardiovascular: No discernable arrhythmia  Pulmonary: No wheezing or stridor  Skin: No masses, erthema, lacerations, fluctation, ulcerations  Neurovascular: Sensation Intact to the Median, Ulnar, Radial Nerve, Motor Intact to the Median, Ulnar, Radial Nerve and Pulses Intact    MUSCULOSKELETAL EXAMINATION:  LEFT SIDE:  Finger:  Tenderness index finger, Triggering  index finger and Nodules  index finger  RIGHT SIDE:  Finger:  Tenderness ring, Triggering  ring finger and Nodules  index finger   She lacks terminal flexion at her left  Index finger by 3 cm and right ring finger by 4cm  NVID    _____________________________________________________  STUDIES REVIEWED:  No Studies to review      PROCEDURES PERFORMED:  Procedures  No Procedures performed today   Scribe Attestation    I,:   Neelima Sebastian am acting as a scribe while in the presence of the attending physician :        I,:   Linda Costa MD personally performed the services described in this documentation    as scribed in my presence :

## 2018-08-07 NOTE — INTERVAL H&P NOTE
H&P updated  The patient was examined and continues with right hand ring finger trigger finger but new onset right hand long finger trigger finger  Positive nodules over both fingers with positive clicking  Decreased range of motion to ring secondary to pain  The patient will undergo trigger finger release right hand long and ring finger  Consent was obtained today    We will do this under local

## 2018-08-14 ENCOUNTER — HOSPITAL ENCOUNTER (OUTPATIENT)
Facility: HOSPITAL | Age: 78
Setting detail: OUTPATIENT SURGERY
Discharge: HOME | End: 2018-08-14
Attending: ORTHOPAEDIC SURGERY | Admitting: ORTHOPAEDIC SURGERY
Payer: MEDICARE

## 2018-08-14 VITALS
BODY MASS INDEX: 37.91 KG/M2 | DIASTOLIC BLOOD PRESSURE: 62 MMHG | WEIGHT: 206 LBS | OXYGEN SATURATION: 97 % | TEMPERATURE: 98.1 F | RESPIRATION RATE: 20 BRPM | SYSTOLIC BLOOD PRESSURE: 122 MMHG | HEIGHT: 62 IN | HEART RATE: 72 BPM

## 2018-08-14 LAB — GLUCOSE SERPL-MCNC: 92 MG/DL (ref 65–140)

## 2018-08-14 PROCEDURE — 26055 INCISE FINGER TENDON SHEATH: CPT | Performed by: ORTHOPAEDIC SURGERY

## 2018-08-14 PROCEDURE — 82948 REAGENT STRIP/BLOOD GLUCOSE: CPT

## 2018-08-14 RX ORDER — MAGNESIUM HYDROXIDE 1200 MG/15ML
LIQUID ORAL AS NEEDED
Status: DISCONTINUED | OUTPATIENT
Start: 2018-08-14 | End: 2018-08-14 | Stop reason: HOSPADM

## 2018-08-14 RX ADMIN — LIDOCAINE HYDROCHLORIDE,EPINEPHRINE BITARTRATE: 10; .01 INJECTION, SOLUTION INFILTRATION; PERINEURAL at 09:48

## 2018-08-14 NOTE — DISCHARGE INSTRUCTIONS
Post Operative Instructions    You have had surgery on your arm today, please read and follow the information below:  · Elevate your hand above your elbow during the next 24-48 hours to help with swelling  · Place your hand and arm over your head with motion at your shoulder three times a day  · Do not apply any cream/ointment/oil to your incisions including antibiotics  · Do not soak your hands in standing water (dishwater, tubs, Jacuzzi's, pools, etc ) until given permission (typically 2-3 weeks after injury)    Call the office if you notice any:  · Increased numbness or tingling of your hand or fingers that is not relieved with elevation  · Increasing pain that is not controlled with medication  · Difficulty chewing, breathing, swallowing  · Chest pains or shortness of breath  · Fever over 101 4 degrees  Bandage: Remove bandage after 5 days  Motion: Move fingers into a fist 5 times a day, DO NOT move any splinted fingers  Weight bearing status: Avoid heavy lifting (>5 pounds) with the extremity that was operated on until follow up appointment  Normal activities of daily living are OK  Ice: Ice for 10 minutes every hour as needed for swelling x 24 hours  Sling: No sling necessary  Pain medication:   Naproxen 500 mg two times a day   Tylenol Extended Release 650 mg every 8 hours  Norco/Hydrocodone one tab every 6 hours AS NEEDED for pain   Patient has meds at home  Follow-up Appointment: 7-10 days  Please call the office if you have any questions or concerns regarding your post-operative care

## 2018-08-14 NOTE — H&P
H&P Exam - Orthopedics   Christofer Amezquita 77 y o  female MRN: [id number]  Unit/Bed#: [facility] 04    Assessment/Plan   Assessment:  Left index trigger finger  Plan:  Left index trigger finger release to be performed today    History of Present Illness   HPI:  Christofer Amezquita is a 77 y o  y o  female who presents with continued complaints of left index finger pain and locking  Denies n/t       Historical Information  Review Of Systems:   · Skin: Normal  · Neuro: See HPI  · Musculoskeletal: See HPI  · 14 point review of systems negative except as stated above     Past Medical History:   Past Medical History:   Diagnosis Date    Arthritis     Diabetes mellitus ([address] )     Disease of thyroid gland     GERD (gastroesophageal reflux disease)     Headache     Hemorrhoids     Hx of radiation therapy 03/09/2012    PBRT    Hypertension     Hypertension     Night sweats     Rheumatoid arthritis (HCC)     Trigger finger     right and left hand    Urinary incontinence     Use of anastrozole (Arimidex)     Use of letrozole (Femara)        Past Surgical History:   Past Surgical History:   Procedure Laterality Date    ABDOMINAL SURGERY      APPENDECTOMY      [facility]162012    [facility]    BREAST LUMPECTOMY Left 02/17/2012    BREAST SURGERY      CATARACT EXTRACTION      CHOLECYSTECTOMY      FOOT SURGERY      right and left foot    HYSTERECTOMY      JOINT REPLACEMENT      KNEE SURGERY      MASTECTOMY      left breast partial mastectomy    DE INCISE FINGER TENDON SHEATH Right 8/7/2018    Procedure: RELEASE TRIGGER FINGER - LONG FINGER AND RING FINGER;  Surgeon: Marco Marion MD;  Location: [facility];  Service: Orthopedics    REPAIR RECTOCELE      TONSILLECTOMY      TUBAL LIGATION         Family History:  Family history reviewed and non-contributory  Family History   Problem Relation Age of Onset    Prostate cancer Father         age at dx [place] Prostate cancer Brother         age at dx ink    Thyroid cancer Brother         age at dx unk    Thyroid cancer Daughter         age at dx unk    Pancreatic cancer Maternal Aunt         age at dx unk    Breast cancer Maternal Aunt         age at dx unk    Lung cancer Maternal Uncle         age at dx unk       Social History:  Social History     Social History    Marital status: /Civil Union     Spouse name: N/A    Number of children: N/A    Years of education: N/A     Social History Main Topics    Smoking status: Never Smoker    Smokeless tobacco: Never Used    Alcohol use Yes    Drug use: No    Sexual activity: Not Asked     Other Topics Concern    None     Social History Narrative    None       Allergies: Allergies   Allergen Reactions    Penicillins     Sulfa Antibiotics            Labs:    0  Lab Value Date/Time   HCT 40 7 05/10/2017 0913   HGB 13 3 05/10/2017 0913   WBC 7 42 05/10/2017 0913   ESR 26 (H) 05/10/2017 0913   CRP 15 5 (H) 05/10/2017 0913       Meds:  No current facility-administered medications for this encounter  Blood Culture:   No results found for: BLOODCX    Wound Culture:   No results found for: WOUNDCULT    Ins and Outs:  No intake/output data recorded  Physical Exam  /76   Pulse 74   Temp 98 6 °F (37 °C) (Tympanic Core)   Resp 17   Ht 5' 1 5" (1 562 m)   Wt 93 4 kg (206 lb)   LMP  (LMP Unknown)   SpO2 96%   BMI 38 29 kg/m²   Gen: Alert and oriented to person, place, time  HEENT: EOMI, eyes clear, moist mucus membranes, hearing intact  Respiratory: Bilateral chest rise   No audible wheezing found  Cardiovascular: Regular Rate and Rhythm  Abdomen: soft nontender/nondistended  Ortho Exam: Left index finger with + nodule, + ttp A1 pulley and + triggering  Neuro Exam: Sensation and motor grossly intact    Lab Results: Reviewed  Imaging: Reviewed

## 2018-08-14 NOTE — OP NOTE
OPERATIVE REPORT  PATIENT NAME: Charanjit Daley  :  1940  MRN: [id number]  Pt Location: [facility]    SURGERY DATE: 18    Surgeon(s) and Role:     * Alcon Schmid MD - Primary     * Fabiano Arnold PA-C - Assisting    Pre-Op Diagnosis:  Trigger finger, left index finger [M65 322]    Post-Op Diagnosis Codes:     * Trigger finger, left index finger [M65 322]    Procedure(s):  RELEASE TRIGGER FINGER - LEFT INDEX FINGER (Left)    Specimen(s):  * No orders in the log *    Estimated Blood Loss:   Minimal      Anesthesia Type:   Local    Operative Indications: The patient has a history of Trigger Finger  left  index finger that was recalcitrant to conservative management  The decision was made to bring the patient to the operating room for Trigger Finger Release  left  index finger  Risks of the procedure were explained which include, but are not limited to bleeding; infection; damage to nerves, arteries,veins, tendons; scar; pain; need for reoperation; failure to give desired result; and risks of anaesthesia  All questions were answered to satisfaction and they were willing to proceed  Operative Findings:  Left index finger trigger finger    Complications:   None    Procedure and Technique:  After the patient, site, and procedure were identified, the patient was brought into the operating room in a supine position  Local anaesthesia was adminstered in the preoperative holding area  A tourniquet was not used  The  left upper extremity was then prepped and drapped in a normal, sterile, orthopedic fashion  After the patient, site, and procedure were once again identified, attention was turned to the left index finger  An incision was made over the flexor tendon sheath at the level of the A1 pulley  Dissection was carried out in-line with the flexor tendon sheath and the radial and ulnar digital artery and nerve were protected  The A1 pulley was identified at the base of the incision  Under direct visualization, the A1 pulley was divided along the midline in its entirety with care taken to avoid injury to the underlying tendon  The tendons were examined to ensure that no further catching, popping, clicking or locking occurred with motion of the finger  At the completion of the procedure, hemostasis was obtained with cautery and direct pressure  The wounds were copiously irrigated with sterile solution  The wounds were closed with Prolene  Sterile dressings were applied, including Xeroform, gauze, tweeners, webril, ACE  Please note, all sponge, needle, and instrument counts were correct prior to closure  Loupe magnification was utilized  The patient tolerated the procedure well       I was present for the entire procedure and A qualified resident physician was not available    Patient Disposition:  APU and hemodynamically stable    SIGNATURE: Maranda Thomas MD  DATE: 08/14/18  TIME: 10:36 AM

## 2018-08-22 ENCOUNTER — OFFICE VISIT (OUTPATIENT)
Dept: OBGYN CLINIC | Facility: HOSPITAL | Age: 78
End: 2018-08-22

## 2018-08-22 VITALS
DIASTOLIC BLOOD PRESSURE: 68 MMHG | HEART RATE: 80 BPM | HEIGHT: 62 IN | BODY MASS INDEX: 39.05 KG/M2 | WEIGHT: 212.2 LBS | SYSTOLIC BLOOD PRESSURE: 122 MMHG

## 2018-08-22 DIAGNOSIS — Z98.890 S/P TRIGGER FINGER RELEASE: Primary | ICD-10-CM

## 2018-08-22 DIAGNOSIS — M62.40 INTRINSIC MUSCLE TIGHTNESS: ICD-10-CM

## 2018-08-22 PROBLEM — M65.322 TRIGGER FINGER, LEFT INDEX FINGER: Status: RESOLVED | Noted: 2018-07-11 | Resolved: 2018-08-22

## 2018-08-22 PROBLEM — M65.341 TRIGGER FINGER, RIGHT RING FINGER: Status: RESOLVED | Noted: 2018-07-11 | Resolved: 2018-08-22

## 2018-08-22 PROBLEM — M65.331 TRIGGER MIDDLE FINGER OF RIGHT HAND: Status: RESOLVED | Noted: 2018-08-07 | Resolved: 2018-08-22

## 2018-08-22 PROCEDURE — 99024 POSTOP FOLLOW-UP VISIT: CPT | Performed by: PHYSICIAN ASSISTANT

## 2018-08-22 NOTE — PROGRESS NOTES
Assessment:   S/P left index TFR 8/14/18 and right long and ring TFR 8/7/18  Intrinsic tightness right ring finger    Plan:   Resume activities as tolerated, scar tissue massage and therapy for ring finger intrinsic tightness    Follow Up:  6  week(s) if needed      CHIEF COMPLAINT:  Chief Complaint   Patient presents with    Left Index Finger - Post-op         SUBJECTIVE:  Charanjit Daley is a 77y o  year old female who presents for follow up S/P left index TFR 8/14/18 and right long and ring TFR 8/7/18  Patient states overall she is doing well, but has discomfort and stiffness to the right ring finger  No clicking/catching  PHYSICAL EXAMINATION:  General: well developed and well nourished, alert, oriented times 3 and appears comfortable  Psychiatric: Normal    MUSCULOSKELETAL EXAMINATION:  Incision: Clean, dry, intact  Range of Motion: As expected for left index and right long fingers   Right ring finger noted to have + intrinsic tightness on exam with tenderness when performing this test  Neurovascular status: Neuro intact, good cap refill  Activity Restrictions: No restrictions  Done today: Sutures out      STUDIES REVIEWED:  No Studies to review      PROCEDURES PERFORMED:  Procedures  No Procedures performed today

## 2018-08-23 ENCOUNTER — EVALUATION (OUTPATIENT)
Dept: OCCUPATIONAL THERAPY | Facility: CLINIC | Age: 78
End: 2018-08-23
Payer: MEDICARE

## 2018-08-23 DIAGNOSIS — Z98.890 S/P TRIGGER FINGER RELEASE: Primary | ICD-10-CM

## 2018-08-23 PROCEDURE — G8984 CARRY CURRENT STATUS: HCPCS | Performed by: OCCUPATIONAL THERAPIST

## 2018-08-23 PROCEDURE — G8985 CARRY GOAL STATUS: HCPCS | Performed by: OCCUPATIONAL THERAPIST

## 2018-08-23 PROCEDURE — 97140 MANUAL THERAPY 1/> REGIONS: CPT | Performed by: OCCUPATIONAL THERAPIST

## 2018-08-23 PROCEDURE — 97165 OT EVAL LOW COMPLEX 30 MIN: CPT | Performed by: OCCUPATIONAL THERAPIST

## 2018-08-23 NOTE — PROGRESS NOTES
OT Evaluation     Today's date: 2018  Patient name: Michelle Lofton  : 1940  MRN: [id number]  Referring provider: Pari Sahu PA-C  Dx:   Encounter Diagnosis     ICD-10-CM    1  S/P trigger finger release Z98 890                   Assessment  Impairments: abnormal or restricted ROM, impaired physical strength and pain with function    Assessment details: Post R LF and RF trigger releases on 18, and L IF trigger release on 18  She has had many previous TFR's bilaterally  Therapy will focus on the R hand, as she states there is minimal impairment or pain regarding the L  We will continue to monitor the progress of the L IF as she continues the HEP at home  She presents with intrinsic tightness in the R RF, decreased FDS gliding, edema, and pain  See below for a detailed assessment  Understanding of Dx/Px/POC: good   Prognosis: good    Goals  STG: Patient will be compliant with post operative precautions (if applicable) and home exercise program in 2 weeks  STG: Pain will be reduced by two subjective levels in 2 weeks  STG: Inflammation/edema/joint effusion will be reduced by 25% and patient will be independent with self management techniques in 4 weeks  STG: Range of motion of middle finger and ring finger will be improved by 25% in 4 weeks  STG: Strength will be improved by 25% in 4 weeks  LTG: Pain will be reduced by 4 subjective levels in 6-8 weeks  LTG: Performance in ADLs and IADLS will be improved to prior level of function with the affected extremity within 8 weeks  LTG: Performance in work/school activity will be improved to prior level of function with the affected extremity within 8 weeks  LTG: FOTO score increase by 20 points within 8 weeks         Plan  Patient would benefit from: skilled OT and OT eval  Planned modality interventions: thermotherapy: hydrocollator packs and thermotherapy: paraffin bath  Planned therapy interventions: functional ROM exercises, home exercise program, joint mobilization, manual therapy, therapeutic exercise, patient education, stretching and strengthening  Frequency: 2x week  Duration in weeks: 6  Treatment plan discussed with: patient  Plan details: Treatment to include modalities, manual therapy, PRE's, HEP, and orthotics as appropriate  Subjective Evaluation    History of Present Illness  Mechanism of injury: Post R LF and RF trigger releases on 18, and L IF trigger release on 18  She has had many previous TFR's bilaterally  She states symptoms for 6-7 months prior to surgery  She is primarily concerned regarding the R RF stiffness  Pain  At best pain ratin  At worst pain ratin  Quality: discomfort and tight    Hand dominance: right    Patient Goals  Patient goals for therapy: decreased pain, increased motion and increased strength          Objective     Observations     Additional Observation Details  Well healing incisions  Active Range of Motion     Left Digits    Flexion   Index     MCP: 60    PIP: 80    DIP: 45  Extension   Index     MCP: -10    Right Digits   Flexion   Middle     MCP: 70    PIP: 80    DIP: 40  Ring     MCP: 51    PIP: 68    DIP: 28  Extension   Middle     MCP: -20  Ring     MCP: -20    Strength/Myotome Testing     Left Wrist/Hand      (2nd hand position)     Trial 1: 30    Right Wrist/Hand      (2nd hand position)     Trial 1: 22    Additional Strength Details  Well healing incisions  Tests     Right Wrist/Hand   Positive extrinsic extensor tightness and intrinsic muscle tightness       Swelling     Left Wrist/Hand   Index     Proximal: 7 cm    Middle: 5 6 cm    Right Wrist/Hand   Middle     Proximal: 6 7 cm    Middle: 5 6 cm  Ring     Proximal: 6 3 cm    Middle: 5 2 cm      Flowsheet Rows      Most Recent Value   PT/OT G-Codes   Current Score  72   Projected Score  78   FOTO information reviewed  Yes   Assessment Type  Evaluation   G code set  Carrying, Moving & Handling Objects   Carrying, Moving and Handling Objects Current Status ()  CJ   Carrying, Moving and Handling Objects Goal Status ()  Mendel Art

## 2018-08-23 NOTE — PROGRESS NOTES
Daily Note     Today's date: 2018  Patient name: Mark Dillon  : 1940  MRN: [id number]  Referring provider: Joseph Harper PA-C  Dx:   Encounter Diagnosis     ICD-10-CM    1  S/P trigger finger release [id number] [id number]                   Subjective: See eval        Objective: See treatment diary below      Assessment: Tolerated treatment well  Patient would benefit from continued OT      Plan: Continue per plan of care       Precautions: [place]    Specialty Daily Treatment Diary     Manual         IASTM FDS 5'       Intrinsic stretching 5'       Place and hold composite grasp 5'                           Exercise Diary         HEP: Blocking, TG, FDS stretching, intrinsic stretching  Issued                                                                                                                                                                   Modalities        MHP 10'

## 2018-08-28 ENCOUNTER — APPOINTMENT (OUTPATIENT)
Dept: OCCUPATIONAL THERAPY | Facility: CLINIC | Age: 78
End: 2018-08-28
Payer: MEDICARE

## 2018-09-05 ENCOUNTER — OFFICE VISIT (OUTPATIENT)
Dept: OCCUPATIONAL THERAPY | Facility: CLINIC | Age: 78
End: 2018-09-05
Payer: MEDICARE

## 2018-09-05 DIAGNOSIS — Z98.890 S/P TRIGGER FINGER RELEASE: Primary | ICD-10-CM

## 2018-09-05 PROCEDURE — G8991 OTHER PT/OT GOAL STATUS: HCPCS | Performed by: OCCUPATIONAL THERAPIST

## 2018-09-05 PROCEDURE — 97140 MANUAL THERAPY 1/> REGIONS: CPT

## 2018-09-05 PROCEDURE — G8990 OTHER PT/OT CURRENT STATUS: HCPCS | Performed by: OCCUPATIONAL THERAPIST

## 2018-09-05 PROCEDURE — 97110 THERAPEUTIC EXERCISES: CPT

## 2018-09-05 NOTE — PROGRESS NOTES
Daily Note     Today's date: 2018  Patient name: Dusty Shi  : 1940  MRN: [id number]  Referring provider: Doreen Quintanilla PA-C  Dx:   Encounter Diagnosis   Name Primary?  S/P trigger finger release Yes                  Subjective: "Do I need to keep coming?"      Objective: See treatment diary below     Manual           IASTM FDS 5'  10'         Intrinsic stretching 5'  5'         Place and hold composite grasp 5'  5'                                           Exercise Diary           HEP: Blocking, TG, FDS stretching, intrinsic stretching  Issued            keyegs    1x each hand          pegs in with hook grasp    1x each hand                                                                                                                                                                                                                                                             Modalities          MHP 10'  10'                                           Assessment: Tolerated treatment well  Noted with minimal stiffness in right digits and had c/o of decreased range of right D4  Noted with scar tissue on left which patient reports she massages at home  WALSH provided soft tissue massage to area  No pain or complaints during activities  Patient requested to be done with therapy and remove appointments as she feels her range is functional  Educated that patient would need to discuss with evaluating OTR regarding POC        Plan: Continued skilled OT per POC    INTERVENTION COMMENTS:  Diagnosis: S/P trigger finger release [Z98 890]  Precautions: [place]  FOTO:  2 of 10 visits, PN due

## 2018-09-12 ENCOUNTER — APPOINTMENT (OUTPATIENT)
Dept: OCCUPATIONAL THERAPY | Facility: CLINIC | Age: 78
End: 2018-09-12
Payer: MEDICARE

## 2018-10-17 ENCOUNTER — OFFICE VISIT (OUTPATIENT)
Dept: OBGYN CLINIC | Facility: HOSPITAL | Age: 78
End: 2018-10-17

## 2018-10-17 VITALS
HEIGHT: 62 IN | DIASTOLIC BLOOD PRESSURE: 78 MMHG | WEIGHT: 225.8 LBS | HEART RATE: 79 BPM | SYSTOLIC BLOOD PRESSURE: 124 MMHG | BODY MASS INDEX: 41.55 KG/M2

## 2018-10-17 DIAGNOSIS — Z98.890 S/P TRIGGER FINGER RELEASE: Primary | ICD-10-CM

## 2018-10-17 PROCEDURE — 99024 POSTOP FOLLOW-UP VISIT: CPT | Performed by: ORTHOPAEDIC SURGERY

## 2018-10-17 RX ORDER — LEVOTHYROXINE SODIUM 88 MCG
TABLET ORAL
COMMUNITY
Start: 2018-09-26

## 2019-02-06 ENCOUNTER — HOSPITAL ENCOUNTER (OUTPATIENT)
Dept: MAMMOGRAPHY | Facility: HOSPITAL | Age: 79
Discharge: HOME | End: 2019-02-06
Attending: SURGERY
Payer: MEDICARE

## 2019-02-06 VITALS — WEIGHT: 225 LBS | HEIGHT: 62 IN | BODY MASS INDEX: 41.41 KG/M2

## 2019-02-06 DIAGNOSIS — Z12.31 SCREENING MAMMOGRAM, ENCOUNTER FOR: ICD-10-CM

## 2019-02-06 PROCEDURE — 77067 SCR MAMMO BI INCL CAD: CPT

## 2019-02-28 ENCOUNTER — OFFICE VISIT (OUTPATIENT)
Dept: SURGICAL ONCOLOGY | Facility: CLINIC | Age: 79
End: 2019-02-28
Payer: MEDICARE

## 2019-02-28 VITALS
DIASTOLIC BLOOD PRESSURE: 70 MMHG | HEIGHT: 62 IN | WEIGHT: 232 LBS | RESPIRATION RATE: 16 BRPM | BODY MASS INDEX: 42.69 KG/M2 | HEART RATE: 86 BPM | SYSTOLIC BLOOD PRESSURE: 110 MMHG | TEMPERATURE: 98.1 F

## 2019-02-28 DIAGNOSIS — Z85.3 PERSONAL HISTORY OF BREAST CANCER: Primary | ICD-10-CM

## 2019-02-28 DIAGNOSIS — Z85.3 ENCOUNTER FOR FOLLOW-UP SURVEILLANCE OF BREAST CANCER: ICD-10-CM

## 2019-02-28 DIAGNOSIS — Z12.31 SCREENING MAMMOGRAM, ENCOUNTER FOR: ICD-10-CM

## 2019-02-28 DIAGNOSIS — Z08 ENCOUNTER FOR FOLLOW-UP SURVEILLANCE OF BREAST CANCER: ICD-10-CM

## 2019-02-28 PROCEDURE — 99213 OFFICE O/P EST LOW 20 MIN: CPT | Performed by: SURGERY

## 2019-02-28 NOTE — PROGRESS NOTES
Surgical Oncology Follow Up       [address] [facility],[address] [place] [address]  [facility] [address] [facility]  1940  [id number]  [address]  [facility] [address] [address]    Chief Complaint   Patient presents with    Follow-up     1 year breast        Assessment/Plan   Diagnoses and all orders for this visit:    Personal history of breast cancer    Screening mammogram, encounter for  -     Mammo screening bilateral w cad; Future    Encounter for follow-up surveillance of breast cancer        Advance Care Planning/Advance Directives:  Discussed disease status, cancer treatment plans and/or cancer treatment goals with the patient  Oncology History:       Personal history of breast cancer    1/11/2012 Initial Diagnosis     Adenocarcinoma of breast ([facility] [address] )         1/16/2012 Surgery     Left breast biopsy , [facility]         2/17/2012 Surgery     Left partial mastectomy, [facility]         3/19/2012 -  Radiation     PBRT  Dr Theodor Ormond,  Dr Warren          4/2012 -  Hormone Therapy     Anastrazole, Letrazole  Dr Naa Curry  Pt d/c due to joint aches  History of Present Illness:  Breast cancer follow-up  -Interval History:  None    Review of Systems:  Review of Systems   Constitutional: Negative  Negative for appetite change and fever  Eyes: Negative  Respiratory: Negative for shortness of breath  Cardiovascular: Negative  Gastrointestinal: Negative  Endocrine: Negative  Genitourinary: Negative  Musculoskeletal: Positive for arthralgias (arthritis)  Negative for myalgias  Skin: Negative  Allergic/Immunologic: Negative  Neurological: Negative  Hematological: Negative  Negative for adenopathy  Does not bruise/bleed easily  Psychiatric/Behavioral: Negative          Patient Active Problem List   Diagnosis    Personal history of breast cancer    Screening mammogram, encounter for    S/P trigger finger release    Intrinsic muscle tightness    Encounter for follow-up surveillance of breast cancer     Past Medical History:   Diagnosis Date    Arthritis     Diabetes mellitus ([address] )     Disease of thyroid gland     GERD (gastroesophageal reflux disease)     Headache     Hemorrhoids     Hx of radiation therapy 03/09/2012    PBRT    Hypertension     Hypertension     Night sweats     Rheumatoid arthritis (HCC)     Trigger finger     right and left hand    Urinary incontinence     Use of anastrozole (Arimidex)     Use of letrozole (Femara)      Past Surgical History:   Procedure Laterality Date    ABDOMINAL SURGERY      APPENDECTOMY      BREAST BIOPSY Left 01/162012    [facility]    BREAST LUMPECTOMY Left 02/17/2012    BREAST SURGERY      CATARACT EXTRACTION      CHOLECYSTECTOMY      FOOT SURGERY      right and left foot    HYSTERECTOMY      JOINT REPLACEMENT      KNEE SURGERY      MASTECTOMY      left breast partial mastectomy    CT INCISE FINGER TENDON SHEATH Right 8/7/2018    Procedure: RELEASE TRIGGER FINGER - LONG FINGER AND RING FINGER;  Surgeon: Edmundo Olmstead MD;  Location: [facility];  Service: Orthopedics    CT INCISE FINGER TENDON SHEATH Left 8/14/2018    Procedure: RELEASE TRIGGER FINGER - LEFT INDEX FINGER;  Surgeon: Edmundo Olmstead MD;  Location[facility];  Service: Orthopedics    REPAIR RECTOCELE      TONSILLECTOMY      TUBAL LIGATION       Family History   Problem Relation Age of Onset    Prostate cancer Father         age at dx unk    Prostate cancer Brother         age at dx ink    Thyroid cancer Brother         age at dx unk    Thyroid cancer Daughter         age at dx unk    Pancreatic cancer Maternal Aunt         age at dx unk    Breast cancer Maternal Aunt 71        age at dx unk    Lung cancer Maternal Uncle         age at dx unk     Social History     Socioeconomic History    Marital status: /Civil Union     Spouse name: Not on file    Number of children: Not on file    Years of education: Not on file    Highest education level: Not on file   Occupational History    Not on file   Social Needs    Financial resource strain: Not on file    Food insecurity:     Worry: Not on file     Inability: Not on file    Transportation needs:     Medical: Not on file     Non-medical: Not on file   Tobacco Use    Smoking status: Never Smoker    Smokeless tobacco: Never Used   Substance and Sexual Activity    Alcohol use: Yes    Drug use: No    Sexual activity: Not on file   Lifestyle    Physical activity:     Days per week: Not on file     Minutes per session: Not on file    Stress: Not on file   Relationships    Social connections:     Talks on phone: Not on file     Gets together: Not on file     Attends Oriental orthodox service: Not on file     Active member of club or organization: Not on file     Attends meetings of clubs or organizations: Not on file     Relationship status: Not on file    Intimate partner violence:     Fear of current or ex partner: Not on file     Emotionally abused: Not on file     Physically abused: Not on file     Forced sexual activity: Not on file   Other Topics Concern    Not on file   Social History Narrative    Not on file       Current Outpatient Medications:     acetaminophen (TYLENOL 8 HOUR) 650 mg CR tablet, Take 1 tablet (650 mg total) by mouth every 8 (eight) hours, Disp: 15 tablet, Rfl: 0    amLODIPine (NORVASC) 10 mg tablet, Take 10 mg by mouth daily  , Disp: , Rfl:     calcium citrate-vitamin D (CITRACAL+D) 315-200 MG-UNIT per tablet, Take 1 tablet by mouth daily  , Disp: , Rfl:     furosemide (LASIX) 40 mg tablet, Take 40 mg by mouth daily  , Disp: , Rfl:     losartan (COZAAR) 100 MG tablet, Take 100 mg by mouth daily  , Disp: , Rfl:     metFORMIN (GLUCOPHAGE) 1000 MG tablet, Take 1,000 mg by mouth 2 (two) times a day with meals  , Disp: , Rfl:     Multiple Vitamin (MULTIVITAMIN) tablet, Take 1 tablet by mouth daily  , Disp: , Rfl:     SYNTHROID 88 MCG tablet, , Disp: , Rfl:   Allergies   Allergen Reactions    Sulfa Antibiotics Rash    Penicillins Rash       The following portions of the patient's history were reviewed and updated as appropriate: allergies, current medications, past family history, past medical history, past social history, past surgical history and problem list         Vitals:    02/28/19 1419   BP: 110/70   Pulse: 86   Resp: 16   Temp: 98 1 °F (36 7 °C)       Physical Exam   Constitutional: She is oriented to person, place, and time  She appears well-developed and well-nourished  HENT:   Head: Normocephalic and atraumatic  Cardiovascular: Normal heart sounds  Pulmonary/Chest: Breath sounds normal  Right breast exhibits no inverted nipple, no mass, no nipple discharge, no skin change and no tenderness  Left breast exhibits skin change (Lumpectomy scar)  Left breast exhibits no inverted nipple, no mass, no nipple discharge and no tenderness  Abdominal: Soft  Lymphadenopathy:        Right axillary: No pectoral and no lateral adenopathy present  Left axillary: No pectoral and no lateral adenopathy present  Right: No supraclavicular adenopathy present  Left: No supraclavicular adenopathy present  Neurological: She is alert and oriented to person, place, and time  Psychiatric: She has a normal mood and affect  Results:      Imaging  02/06/2019 bilateral screening mammogram is benign BI-RADS two with a density of two    I reviewed the above imaging data  Discussion/Summary:  76-year-old female status post left breast conservation for stage IA invasive ductal carcinoma  She completed adjuvant radiation and an aromatase inhibitor  There is no evidence of disease based on examination today  Her recent mammogram was also benign    I will see her again in one year for another exam following her mammogram or sooner should the need arise

## 2019-03-18 ENCOUNTER — TRANSCRIBE ORDERS (OUTPATIENT)
Dept: ADMINISTRATIVE | Facility: HOSPITAL | Age: 79
End: 2019-03-18

## 2019-03-18 DIAGNOSIS — M79.89 LEG SWELLING: Primary | ICD-10-CM

## 2019-03-19 ENCOUNTER — HOSPITAL ENCOUNTER (OUTPATIENT)
Dept: NON INVASIVE DIAGNOSTICS | Facility: CLINIC | Age: 79
Discharge: HOME | End: 2019-03-19
Payer: MEDICARE

## 2019-03-19 DIAGNOSIS — M79.89 LEG SWELLING: ICD-10-CM

## 2019-03-19 PROCEDURE — 93970 EXTREMITY STUDY: CPT

## 2019-03-19 PROCEDURE — 93970 EXTREMITY STUDY: CPT | Performed by: SURGERY

## 2020-02-07 ENCOUNTER — HOSPITAL ENCOUNTER (OUTPATIENT)
Dept: MAMMOGRAPHY | Facility: HOSPITAL | Age: 80
Discharge: HOME | End: 2020-02-07
Attending: SURGERY
Payer: MEDICARE

## 2020-02-07 VITALS — BODY MASS INDEX: 42.69 KG/M2 | WEIGHT: 232 LBS | HEIGHT: 62 IN

## 2020-02-07 DIAGNOSIS — Z12.31 SCREENING MAMMOGRAM, ENCOUNTER FOR: ICD-10-CM

## 2020-02-07 PROCEDURE — 77067 SCR MAMMO BI INCL CAD: CPT

## 2020-03-12 ENCOUNTER — OFFICE VISIT (OUTPATIENT)
Dept: SURGICAL ONCOLOGY | Facility: CLINIC | Age: 80
End: 2020-03-12
Payer: MEDICARE

## 2020-03-12 VITALS
WEIGHT: 222.5 LBS | HEIGHT: 62 IN | TEMPERATURE: 97.1 F | SYSTOLIC BLOOD PRESSURE: 138 MMHG | DIASTOLIC BLOOD PRESSURE: 80 MMHG | RESPIRATION RATE: 18 BRPM | HEART RATE: 97 BPM | BODY MASS INDEX: 40.94 KG/M2

## 2020-03-12 DIAGNOSIS — Z08 ENCOUNTER FOR FOLLOW-UP SURVEILLANCE OF BREAST CANCER: Primary | ICD-10-CM

## 2020-03-12 DIAGNOSIS — Z85.3 PERSONAL HISTORY OF BREAST CANCER: ICD-10-CM

## 2020-03-12 DIAGNOSIS — Z85.3 ENCOUNTER FOR FOLLOW-UP SURVEILLANCE OF BREAST CANCER: Primary | ICD-10-CM

## 2020-03-12 DIAGNOSIS — Z12.31 SCREENING MAMMOGRAM, ENCOUNTER FOR: ICD-10-CM

## 2020-03-12 PROCEDURE — 99213 OFFICE O/P EST LOW 20 MIN: CPT | Performed by: SURGERY

## 2020-03-12 NOTE — PROGRESS NOTES
Surgical Oncology Follow Up       [address] [facility] [place]  [address] [facility] [address]  [place] [address]    Tarri Sine  1940  [id number]  [address] [facility] [place]  [address] [place] [address]    Chief Complaint   Patient presents with    Follow-up       Assessment/Plan   Diagnoses and all orders for this visit:    Encounter for follow-up surveillance of breast cancer    Screening mammogram, encounter for  -     Mammo screening bilateral w 3d & cad; Future    Personal history of breast cancer        Advance Care Planning/Advance Directives:  Discussed disease status, cancer treatment plans and/or cancer treatment goals with the patient  Oncology History:       Personal history of breast cancer    1/11/2012 Initial Diagnosis     Adenocarcinoma of breast ([facility] [address] )      1/16/2012 Surgery     Left breast biopsy , [facility]      2/17/2012 Surgery     Left partial mastectomy, [facility]      3/19/2012 -  Radiation     PBRT  Dr Josee Norwood,  Dr Chani Martin      4/2012 -  Hormone Therapy     Anastrazole, Letrazole  Dr Angel Daugherty  Pt d/c due to joint aches  History of Present Illness:  Breast cancer follow-up, no concerns  -Interval History:  Recent mammogram    Review of Systems:  Review of Systems   Constitutional: Negative  Negative for appetite change and fever  Eyes: Negative  Respiratory: Negative for shortness of breath  Cardiovascular: Negative  Gastrointestinal: Negative  Endocrine: Negative  Genitourinary: Negative  Musculoskeletal: Negative  Negative for arthralgias and myalgias  Skin: Negative  Allergic/Immunologic: Negative  Neurological: Negative  Hematological: Negative  Negative for adenopathy  Does not bruise/bleed easily  Psychiatric/Behavioral: Negative          Patient Active Problem List   Diagnosis    Personal history of breast cancer    Screening mammogram, encounter for  S/P trigger finger release    Intrinsic muscle tightness    Encounter for follow-up surveillance of breast cancer     Past Medical History:   Diagnosis Date    Arthritis     Diabetes mellitus ([address] )     Disease of thyroid gland     GERD (gastroesophageal reflux disease)     Headache     Hemorrhoids     Hx of radiation therapy 03/09/2012    PBRT    Hypertension     Hypertension     Night sweats     Rheumatoid arthritis (HCC)     Trigger finger     right and left hand    Urinary incontinence     Use of anastrozole (Arimidex)     Use of letrozole (Femara)      Past Surgical History:   Procedure Laterality Date    ABDOMINAL SURGERY      APPENDECTOMY      BREAST BIOPSY Left 01/162012    [facility]    BREAST LUMPECTOMY Left 02/17/2012    BREAST SURGERY      CATARACT EXTRACTION      CHOLECYSTECTOMY      FOOT SURGERY      right and left foot    HYSTERECTOMY      JOINT REPLACEMENT      KNEE SURGERY      ID INCISE FINGER TENDON SHEATH Right 8/7/2018    Procedure: RELEASE TRIGGER FINGER - LONG FINGER AND RING FINGER;  Surgeon: Camelia Burris MD;  [facility];  Service: Orthopedics    ID INCISE FINGER TENDON SHEATH Left 8/14/2018    Procedure: RELEASE TRIGGER FINGER - LEFT INDEX FINGER;  Surgeon: Camelia Burris MD;  [facility];  Service: Orthopedics    REPAIR RECTOCELE      TONSILLECTOMY      TUBAL LIGATION       Family History   Problem Relation Age of Onset    Prostate cancer Father         age at dx unk    Prostate cancer Brother 68    Thyroid cancer Brother         age at dx unk    Thyroid cancer Daughter 39        age at dx unk    Breast cancer Maternal Aunt 71        age at dx unk    Lung cancer Maternal Uncle         age at dx unk    No Known Problems Son     No Known Problems Son     No Known Problems Son     Pancreatic cancer Maternal Aunt         unknown age     Social History     Socioeconomic History    Marital status: /Civil Union     Spouse name: Not on file    Number of children: Not on file    Years of education: Not on file    Highest education level: Not on file   Occupational History    Not on file   Social Needs    Financial resource strain: Not on file    Food insecurity:     Worry: Not on file     Inability: Not on file    Transportation needs:     Medical: Not on file     Non-medical: Not on file   Tobacco Use    Smoking status: Never Smoker    Smokeless tobacco: Never Used   Substance and Sexual Activity    Alcohol use: Yes    Drug use: No    Sexual activity: Not on file   Lifestyle    Physical activity:     Days per week: Not on file     Minutes per session: Not on file    Stress: Not on file   Relationships    Social connections:     Talks on phone: Not on file     Gets together: Not on file     Attends Islam service: Not on file     Active member of club or organization: Not on file     Attends meetings of clubs or organizations: Not on file     Relationship status: Not on file    Intimate partner violence:     Fear of current or ex partner: Not on file     Emotionally abused: Not on file     Physically abused: Not on file     Forced sexual activity: Not on file   Other Topics Concern    Not on file   Social History Narrative    Not on file       Current Outpatient Medications:     acetaminophen (TYLENOL 8 HOUR) 650 mg CR tablet, Take 1 tablet (650 mg total) by mouth every 8 (eight) hours, Disp: 15 tablet, Rfl: 0    amLODIPine (NORVASC) 10 mg tablet, Take 10 mg by mouth daily  , Disp: , Rfl:     calcium citrate-vitamin D (CITRACAL+D) 315-200 MG-UNIT per tablet, Take 1 tablet by mouth daily  , Disp: , Rfl:     furosemide (LASIX) 40 mg tablet, Take 40 mg by mouth daily  , Disp: , Rfl:     losartan (COZAAR) 100 MG tablet, Take 100 mg by mouth daily  , Disp: , Rfl:     metFORMIN (GLUCOPHAGE) 1000 MG tablet, Take 1,000 mg by mouth 2 (two) times a day with meals  , Disp: , Rfl:     Multiple Vitamin (MULTIVITAMIN) tablet, Take 1 tablet by mouth daily  , Disp: , Rfl:     SYNTHROID 88 MCG tablet, , Disp: , Rfl:   Allergies   Allergen Reactions    Sulfa Antibiotics Rash    Penicillins Rash       The following portions of the patient's history were reviewed and updated as appropriate: allergies, current medications, past family history, past medical history, past social history, past surgical history and problem list         Vitals:    03/12/20 1406   BP: 138/80   Pulse: 97   Resp: 18   Temp: (!) 97 1 °F (36 2 °C)       Physical Exam   Constitutional: She is oriented to person, place, and time  She appears well-developed and well-nourished  HENT:   Head: Normocephalic and atraumatic  Cardiovascular: Normal heart sounds  Pulmonary/Chest: Breath sounds normal  Right breast exhibits no inverted nipple, no mass, no nipple discharge, no skin change and no tenderness  Left breast exhibits skin change (Lumpectomy scar)  Left breast exhibits no inverted nipple, no mass, no nipple discharge and no tenderness  Abdominal: Soft  Lymphadenopathy:        Right axillary: No pectoral and no lateral adenopathy present  Left axillary: No pectoral and no lateral adenopathy present  Right: No supraclavicular adenopathy present  Left: No supraclavicular adenopathy present  Neurological: She is alert and oriented to person, place, and time  Psychiatric: She has a normal mood and affect  Results:  Labs:      Imaging  02/27/2020 bilateral screening mammogram is benign    I reviewed the above imaging data  Discussion/Summary:  79-year-old female status post left breast conservation for stage I a invasive ductal carcinoma  She had partial breast radiation and took an aromatase inhibitor  There is no evidence of disease based on examination today or recent mammogram   I will therefore see her again in one year or sooner should the need arise

## 2020-06-08 LAB
CREAT ?TM UR-SCNC: 226 UMOL/L
EXT MICROALBUMIN URINE RANDOM: 1.3
HBA1C MFR BLD HPLC: 6.1 %
MICROALBUMIN/CREAT UR: 6 MG/G{CREAT}

## 2020-08-09 NOTE — PROGRESS NOTES
Assessment and Plan:   Ms Rafael Wolff is an 26-year-old  female with history significant for seronegative rheumatoid arthritis (diagnosed by Dr Minna Kelley), primary generalized osteoarthritis, bilateral trigger fingers status post surgery in 2014, Hashimoto's thyroiditis and breast cancer, currently in remission, who presents to reestablish with Rheumatology in view of her diagnosis of rheumatoid arthritis an ongoing joint pains  She is self-referred  Deatra Solders presents today for continued management of seronegative rheumatoid arthritis that was diagnosed in 2015 after she presented with arthritic complaints affecting her bilateral hands and wrists, with soft tissue swelling noted of these joints  She was initially started on a combination of methotrexate and prednisone which she recalls as helping with her symptoms, but they were discontinued in 2017 due to transaminitis in after she was lost to Rheumatology follow-up  Other than these medications she was also trialed on IV Orencia which was ineffective  - I would like to re-evaluate her diagnosis of rheumatoid arthritis at this time  Although she has been off of DMARDs for the past 2 years I do not appreciate any significant synovitis on her physical examination today, and question if her diffuse musculoskeletal complaints could be related to a combination of osteoarthritis and fibromyalgia  We will repeat the rheumatoid serologies as well as update her x-rays to assess for any erosive changes  In the meanwhile until the workup is complete I requested she continue with the Tylenol arthritis as needed and she can also add on 400 mg of ibuprofen twice daily as needed  - Follow up in 2 months  I reviewed her extensive medical records through her prior rheumatologist relating to her diagnosis of rheumatoid arthritis as well as treatment course        Plan:  Diagnoses and all orders for this visit:    Seronegative rheumatoid arthritis ([facility] [address] )  - CBC and differential; Future  -     Comprehensive metabolic panel; Future  -     Chronic Hepatitis Panel; Future  -     C-reactive protein; Future  -     Sedimentation rate, automated; Future  -     Cyclic citrul peptide antibody, IgG; Future  -     RF Screen w/ Reflex to Titer; Future  -     Sjogren's Antibodies; Future  -     Quantiferon TB Gold Plus; Future  -     XR hand 3+ vw right; Future  -     XR hand 3+ vw left; Future  -     XR foot 3+ vw right; Future  -     XR foot 3+ vw left; Future    Primary generalized (osteo)arthritis    Osteoarthritis of cervical spine, unspecified spinal osteoarthritis complication status    Hashimoto's thyroiditis    History of breast cancer    Screening-pulmonary TB  -     Quantiferon TB Gold Plus; Future    Encounter for screening for other viral diseases   -     Chronic Hepatitis Panel; Future    Class 3 severe obesity without serious comorbidity with body mass index (BMI) of 40 0 to 44 9 in adult, unspecified obesity type ([facility] [address] )    Other orders  -     OneTouch Verio test strip; use 1 TEST STRIP to TEST BLOOD SUGAR once daily  -     Lancets (OneTouch Delica Plus [id number]) MISC; TEST BLOOD GLUCOSE DAILY  -     Discontinue: methotrexate 2 5 mg tablet; Take 2 5 mg by mouth once a week  -     Discontinue: predniSONE 1 mg tablet; Take 1 mg by mouth daily PT not sure of dosage      Activities as tolerated    Diet: low carb/low fat, more greens/vegetables, adequate hydration  Exercise: try to maintain a low impact exercise regimen as much as possible  Walk for 30 minutes a day for at least 3 days a week    Encouraged to maintain good sleep hygiene  Continue other medications as prescribed by PCP and other specialists        RTC in 2 months  HPI  Ms Shyla Woods is an 50-year-old  female with history significant for seronegative rheumatoid arthritis (diagnosed by Dr Joan Bloom), primary generalized osteoarthritis, bilateral trigger fingers status post surgery in 2014, Hashimoto's thyroiditis and breast cancer, currently in remission, who presents to reestablish with Rheumatology in view of her diagnosis of rheumatoid arthritis an ongoing joint pains  She is self-referred  Patient reports in January 2015 she started to experience episodic pain and swelling affecting her hands and wrists  At that time she established with Rheumatology with further evaluation showing a negative rheumatoid factor and anti CCP antibody, but based on her symptoms was diagnosed with early, seronegative rheumatoid arthritis  Apparently at that time she also had soft tissue swelling noted at her MCPs  She was initially started on methotrexate at a maximum of 20 mg once weekly along with prednisone 5 mg once daily  She did feel like it was helping with her symptoms, but due to a high Vectra DA score IV Orencia was added on to the methotrexate in April 2016  The Orencia did not help with her joint pains and as the co-pay was too high it was discontinued in May 2017  At that time she was only continued on the methotrexate at 10 mg once weekly  In view of transaminitis the methotrexate and prednisone were also discontinued in 2017 and she has been off of DMARDs since then  She has also been lost to Rheumatology follow-up since then  She reports ongoing pain that is affecting her hands, wrists, elbows, shoulders, hips into her groin, knees (bilateral replacements) and occasionally in her ankles and feet  She does have swelling of her ankles secondary to venous insufficiency  On occasion she may notice swelling of her hands  She does experience morning stiffness which affects her diffusely and takes at least 30 minutes to improve  Currently she is only managing her symptoms with Tylenol arthritis every 6 hours as needed which does help to a mild degree  She has not tried taking any NSAIDs but tells me she does not have any contraindications to this group of medications      She denies a history of fevers, unintentional weight loss, skin rash (except for related to the venous insufficiency of her lower extremities) or psoriasis  She reports a history of rheumatoid arthritis in her mother  The following portions of the patient's history were reviewed and updated as appropriate: allergies, current medications, past family history, past medical history, past social history, past surgical history and problem list       Review of Systems  Constitutional: Negative for weight change, fevers, chills, night sweats, fatigue  ENT/Mouth: Negative for hearing changes, ear pain, nasal congestion, sinus pain, hoarseness, sore throat, rhinorrhea, swallowing difficulty  Eyes: Negative for pain, redness, discharge, vision changes  Cardiovascular: Negative for chest pain, SOB, palpitations  Respiratory: Negative for cough, sputum, wheezing, dyspnea  Gastrointestinal: Negative for nausea, vomiting, diarrhea, constipation, pain, heartburn  Genitourinary: Negative for dysuria, urinary frequency, hematuria  Musculoskeletal: As per HPI  Skin: Negative for skin rash, color changes  Neuro: Negative for weakness, numbness, tingling, loss of consciousness  Psych: Negative for anxiety, depression  Heme/Lymph: Negative for easy bruising, bleeding, lymphadenopathy          Past Medical History:   Diagnosis Date    Arthritis     Diabetes mellitus ([facility] [address] )     Disease of thyroid gland     GERD (gastroesophageal reflux disease)     Headache     Hemorrhoids     Hx of radiation therapy 03/09/2012    PBRT    Hypertension     Hypertension     Night sweats     Rheumatoid arthritis (HCC)     Trigger finger     right and left hand    Urinary incontinence     Use of anastrozole (Arimidex)     Use of letrozole (Femara)        Past Surgical History:   Procedure Laterality Date    ABDOMINAL SURGERY      APPENDECTOMY      BREAST BIOPSY Left 01/162012    [facility]    BREAST LUMPECTOMY Left 02/17/2012    BREAST SURGERY      CATARACT EXTRACTION      CHOLECYSTECTOMY      FOOT SURGERY      right and left foot    HYSTERECTOMY      JOINT REPLACEMENT      KNEE SURGERY      KS INCISE FINGER TENDON SHEATH Right 8/7/2018    Procedure: RELEASE TRIGGER FINGER - LONG FINGER AND RING FINGER;  Surgeon: Teo Broussard MD;  [facility];  Service: Orthopedics    KS INCISE FINGER TENDON SHEATH Left 8/14/2018    Procedure: RELEASE TRIGGER FINGER - LEFT INDEX FINGER;  Surgeon: Teo Broussard MD;  [facility];  Service: Orthopedics    REPAIR RECTOCELE      TONSILLECTOMY      TUBAL LIGATION         Social History     Socioeconomic History    Marital status: /Civil Union     Spouse name: Not on file    Number of children: Not on file    Years of education: Not on file    Highest education level: Not on file   Occupational History    Not on file   Social Needs    Financial resource strain: Not on file    Food insecurity     Worry: Not on file     Inability: Not on file   [employer] needs     Medical: Not on file     Non-medical: Not on file   Tobacco Use    Smoking status: Never Smoker    Smokeless tobacco: Never Used   Substance and Sexual Activity    Alcohol use:  Yes    Drug use: No    Sexual activity: Not on file   Lifestyle    Physical activity     Days per week: Not on file     Minutes per session: Not on file    Stress: Not on file   Relationships    Social connections     Talks on phone: Not on file     Gets together: Not on file     Attends Pent[employer]ostalism service: Not on file     Active member of club or organization: Not on file     Attends meetings of clubs or organizations: Not on file     Relationship status: Not on file    Intimate partner violence     Fear of current or ex partner: Not on file     Emotionally abused: Not on file     Physically abused: Not on file     Forced sexual activity: Not on file   Other Topics Concern    Not on file   Social History Narrative    Not on file       Family History   Problem Relation Age of Onset    Prostate cancer Father         age at dx unk    Prostate cancer Brother 68    Thyroid cancer Brother         age at dx unk    Thyroid cancer Daughter 39        age at dx unk    Breast cancer Maternal Aunt 71        age at dx unk    Lung cancer Maternal Uncle         age at dx unk    No Known Problems Son     No Known Problems Son     No Known Problems Son     Pancreatic cancer Maternal Aunt         unknown age       Allergies   Allergen Reactions    Sulfa Antibiotics Rash    Penicillins Rash       Current Outpatient Medications:     acetaminophen (TYLENOL 8 HOUR) 650 mg CR tablet, Take 1 tablet (650 mg total) by mouth every 8 (eight) hours, Disp: 15 tablet, Rfl: 0    amLODIPine (NORVASC) 10 mg tablet, Take 10 mg by mouth daily  , Disp: , Rfl:     calcium citrate-vitamin D (CITRACAL+D) 315-200 MG-UNIT per tablet, Take 1 tablet by mouth daily  , Disp: , Rfl:     furosemide (LASIX) 40 mg tablet, Take 40 mg by mouth daily  , Disp: , Rfl:     Lancets (OneTouch Delica Plus [id number]) MISC, TEST BLOOD GLUCOSE DAILY, Disp: , Rfl:     losartan (COZAAR) 100 MG tablet, Take 100 mg by mouth daily  , Disp: , Rfl:     metFORMIN (GLUCOPHAGE) 1000 MG tablet, Take 1,000 mg by mouth 2 (two) times a day with meals  , Disp: , Rfl:     Multiple Vitamin (MULTIVITAMIN) tablet, Take 1 tablet by mouth daily  , Disp: , Rfl:     OneTouch Verio test strip, use 1 TEST STRIP to TEST BLOOD SUGAR once daily, Disp: , Rfl:     SYNTHROID 88 MCG tablet, , Disp: , Rfl:       Objective:    Vitals:    08/12/20 1412   BP: 138/92   BP Location: Right arm   Patient Position: Sitting   Cuff Size: Extra-Large   Pulse: 96   Temp: 99 4 °F (37 4 °C)   Weight: 98 4 kg (217 lb)       Physical Exam  General: Well appearing, well nourished, in no distress  Oriented x 3, normal mood and affect  Ambulating without difficulty  Obese    Skin: Good turgor, no unusual bruising or prominent lesions  Hyperpigmentation noted on her lower extremities secondary to venous stasis as well as varicosities  Hair: Normal texture and distribution  Nails: Normal color, no deformities  HEENT:  Head: Normocephalic, atraumatic  Eyes: Conjunctiva clear, sclera non-icteric, EOM intact  Extremities: No amputations or deformities, cyanosis  Bilateral lower extremity nonpitting edema noted related to venous insufficiency  Musculoskeletal:   Hands - there is mild puffiness noted across the 4th and 5th MCP joints bilaterally, but I do not think this is related to synovitis  There is no tenderness to palpation of the small joints of her hands  Wrists - there is again puffiness noted which I think is related to fatty tissue  There is no synovitis or tenderness  Elbows and shoulders - unremarkable  Knees - status post bilateral replacement  Ankles - chronic ankle edema noted  Feet - negative MTP squeeze test bilaterally  Neurologic: Alert and oriented  No focal neurological deficits appreciated  Psychiatric: Normal mood and affect  Dorthey Schirmer, M D    Rheumatology

## 2020-08-12 ENCOUNTER — OFFICE VISIT (OUTPATIENT)
Dept: RHEUMATOLOGY | Facility: CLINIC | Age: 80
End: 2020-08-12
Payer: MEDICARE

## 2020-08-12 VITALS
DIASTOLIC BLOOD PRESSURE: 92 MMHG | BODY MASS INDEX: 40.34 KG/M2 | WEIGHT: 217 LBS | SYSTOLIC BLOOD PRESSURE: 138 MMHG | HEART RATE: 96 BPM | TEMPERATURE: 99.4 F

## 2020-08-12 DIAGNOSIS — M15.0 PRIMARY GENERALIZED (OSTEO)ARTHRITIS: ICD-10-CM

## 2020-08-12 DIAGNOSIS — Z85.3 HISTORY OF BREAST CANCER: ICD-10-CM

## 2020-08-12 DIAGNOSIS — Z11.59 ENCOUNTER FOR SCREENING FOR OTHER VIRAL DISEASES: ICD-10-CM

## 2020-08-12 DIAGNOSIS — Z11.1 SCREENING-PULMONARY TB: ICD-10-CM

## 2020-08-12 DIAGNOSIS — E06.3 HASHIMOTO'S THYROIDITIS: ICD-10-CM

## 2020-08-12 DIAGNOSIS — E66.01 CLASS 3 SEVERE OBESITY WITHOUT SERIOUS COMORBIDITY WITH BODY MASS INDEX (BMI) OF 40.0 TO 44.9 IN ADULT, UNSPECIFIED OBESITY TYPE (HCC): ICD-10-CM

## 2020-08-12 DIAGNOSIS — M47.812 OSTEOARTHRITIS OF CERVICAL SPINE, UNSPECIFIED SPINAL OSTEOARTHRITIS COMPLICATION STATUS: ICD-10-CM

## 2020-08-12 DIAGNOSIS — M06.00 SERONEGATIVE RHEUMATOID ARTHRITIS (HCC): Primary | ICD-10-CM

## 2020-08-12 PROCEDURE — 99205 OFFICE O/P NEW HI 60 MIN: CPT | Performed by: INTERNAL MEDICINE

## 2020-08-12 RX ORDER — PREDNISONE 1 MG/1
1 TABLET ORAL DAILY
COMMUNITY
End: 2020-08-12

## 2020-08-12 RX ORDER — LANCETS 30 GAUGE
EACH MISCELLANEOUS
COMMUNITY
Start: 2020-07-30

## 2020-08-12 RX ORDER — BLOOD SUGAR DIAGNOSTIC
STRIP MISCELLANEOUS
COMMUNITY
Start: 2020-07-29

## 2020-09-01 ENCOUNTER — TELEPHONE (OUTPATIENT)
Dept: RHEUMATOLOGY | Facility: CLINIC | Age: 80
End: 2020-09-01

## 2020-09-01 NOTE — TELEPHONE ENCOUNTER
Please let her know I received her letter  Please ask her to cancel the CBC order, but I would like her to have all the other tests done  Thanks

## 2020-09-18 LAB — HCV AB SER-ACNC: NON REACTIVE

## 2020-09-29 ENCOUNTER — HOSPITAL ENCOUNTER (OUTPATIENT)
Dept: RADIOLOGY | Facility: HOSPITAL | Age: 80
Discharge: HOME | End: 2020-09-29
Payer: MEDICARE

## 2020-09-29 DIAGNOSIS — M06.00 SERONEGATIVE RHEUMATOID ARTHRITIS (HCC): ICD-10-CM

## 2020-09-29 PROCEDURE — 73630 X-RAY EXAM OF FOOT: CPT

## 2020-09-29 PROCEDURE — 73130 X-RAY EXAM OF HAND: CPT

## 2020-10-20 ENCOUNTER — TELEPHONE (OUTPATIENT)
Dept: RHEUMATOLOGY | Facility: CLINIC | Age: 80
End: 2020-10-20

## 2020-10-20 DIAGNOSIS — M06.00 SERONEGATIVE RHEUMATOID ARTHRITIS (HCC): Primary | ICD-10-CM

## 2020-10-27 ENCOUNTER — OFFICE VISIT (OUTPATIENT)
Dept: RHEUMATOLOGY | Facility: CLINIC | Age: 80
End: 2020-10-27
Payer: MEDICARE

## 2020-10-27 VITALS — HEART RATE: 77 BPM | SYSTOLIC BLOOD PRESSURE: 128 MMHG | TEMPERATURE: 98.6 F | DIASTOLIC BLOOD PRESSURE: 86 MMHG

## 2020-10-27 DIAGNOSIS — E66.01 CLASS 3 SEVERE OBESITY WITHOUT SERIOUS COMORBIDITY WITH BODY MASS INDEX (BMI) OF 40.0 TO 44.9 IN ADULT, UNSPECIFIED OBESITY TYPE (HCC): ICD-10-CM

## 2020-10-27 DIAGNOSIS — M15.0 PRIMARY GENERALIZED (OSTEO)ARTHRITIS: ICD-10-CM

## 2020-10-27 DIAGNOSIS — M25.541 ARTHRALGIA OF HANDS, BILATERAL: ICD-10-CM

## 2020-10-27 DIAGNOSIS — E06.3 HASHIMOTO'S THYROIDITIS: ICD-10-CM

## 2020-10-27 DIAGNOSIS — Z85.3 HISTORY OF BREAST CANCER: ICD-10-CM

## 2020-10-27 DIAGNOSIS — R74.01 TRANSAMINITIS: ICD-10-CM

## 2020-10-27 DIAGNOSIS — M47.812 OSTEOARTHRITIS OF CERVICAL SPINE, UNSPECIFIED SPINAL OSTEOARTHRITIS COMPLICATION STATUS: ICD-10-CM

## 2020-10-27 DIAGNOSIS — Z87.39 HISTORY OF RHEUMATOID ARTHRITIS: Primary | ICD-10-CM

## 2020-10-27 DIAGNOSIS — M25.542 ARTHRALGIA OF HANDS, BILATERAL: ICD-10-CM

## 2020-10-27 PROCEDURE — 99214 OFFICE O/P EST MOD 30 MIN: CPT | Performed by: INTERNAL MEDICINE

## 2020-11-27 ENCOUNTER — HOSPITAL ENCOUNTER (OUTPATIENT)
Dept: RADIOLOGY | Facility: HOSPITAL | Age: 80
Discharge: HOME | End: 2020-11-27
Attending: INTERNAL MEDICINE
Payer: MEDICARE

## 2020-11-27 DIAGNOSIS — M25.542 ARTHRALGIA OF HANDS, BILATERAL: ICD-10-CM

## 2020-11-27 DIAGNOSIS — Z87.39 HISTORY OF RHEUMATOID ARTHRITIS: ICD-10-CM

## 2020-11-27 DIAGNOSIS — M25.541 ARTHRALGIA OF HANDS, BILATERAL: ICD-10-CM

## 2020-11-27 PROCEDURE — 76882 US LMTD JT/FCL EVL NVASC XTR: CPT

## 2020-11-30 ENCOUNTER — TELEPHONE (OUTPATIENT)
Dept: RHEUMATOLOGY | Facility: CLINIC | Age: 80
End: 2020-11-30

## 2021-02-08 ENCOUNTER — HOSPITAL ENCOUNTER (OUTPATIENT)
Dept: MAMMOGRAPHY | Facility: HOSPITAL | Age: 81
Discharge: HOME | End: 2021-02-08
Attending: SURGERY
Payer: MEDICARE

## 2021-02-08 VITALS — HEIGHT: 62 IN | BODY MASS INDEX: 39.93 KG/M2 | WEIGHT: 217 LBS

## 2021-02-08 DIAGNOSIS — Z12.31 SCREENING MAMMOGRAM, ENCOUNTER FOR: ICD-10-CM

## 2021-02-08 PROCEDURE — 77067 SCR MAMMO BI INCL CAD: CPT

## 2021-02-08 PROCEDURE — 77063 BREAST TOMOSYNTHESIS BI: CPT

## 2021-03-11 ENCOUNTER — OFFICE VISIT (OUTPATIENT)
Dept: SURGICAL ONCOLOGY | Facility: CLINIC | Age: 81
End: 2021-03-11
Payer: MEDICARE

## 2021-03-11 VITALS
HEIGHT: 62 IN | SYSTOLIC BLOOD PRESSURE: 132 MMHG | TEMPERATURE: 97.8 F | HEART RATE: 78 BPM | BODY MASS INDEX: 42.14 KG/M2 | DIASTOLIC BLOOD PRESSURE: 80 MMHG | WEIGHT: 229 LBS

## 2021-03-11 DIAGNOSIS — Z85.3 PERSONAL HISTORY OF BREAST CANCER: ICD-10-CM

## 2021-03-11 DIAGNOSIS — Z85.3 ENCOUNTER FOR FOLLOW-UP SURVEILLANCE OF BREAST CANCER: Primary | ICD-10-CM

## 2021-03-11 DIAGNOSIS — Z08 ENCOUNTER FOR FOLLOW-UP SURVEILLANCE OF BREAST CANCER: Primary | ICD-10-CM

## 2021-03-11 DIAGNOSIS — Z12.31 SCREENING MAMMOGRAM, ENCOUNTER FOR: ICD-10-CM

## 2021-03-11 PROCEDURE — 99213 OFFICE O/P EST LOW 20 MIN: CPT | Performed by: SURGERY

## 2021-03-11 NOTE — PROGRESS NOTES
Surgical Oncology Follow Up       [address]  [facility]  [address] [facility] [address]    Marnie Mitchell  1940  [id number]  [address] [facility] [address] [address]  [facility]  [address] [id number]    Chief Complaint   Patient presents with    Follow-up       Assessment/Plan   Diagnoses and all orders for this visit:    Encounter for follow-up surveillance of breast cancer    Personal history of breast cancer    Screening mammogram, encounter for  -     Mammo screening bilateral w 3d & cad; Future        Advance Care Planning/Advance Directives:  Discussed disease status, cancer treatment plans and/or cancer treatment goals with the patient  Oncology History:    Oncology History   Personal history of breast cancer   1/11/2012 Initial Diagnosis    Adenocarcinoma of breast ([facility] [address] )     1/16/2012 Surgery    Left breast biopsy , [facility]     2/17/2012 Surgery    Left partial mastectomy, [facility]     3/19/2012 -  Radiation    PBRT  Dr Ximena Narayanan,  Dr Timmy Gaston     4/2012 -  Hormone Therapy    Anastrazole, Letrazole  Dr Hernán Frank  Pt d/c due to joint aches  History of Present Illness:  Breast cancer follow-up, reports occasional tenderness in the lumpectomy bed  -Interval History: recent mammogram    Review of Systems:  Review of Systems   Constitutional: Negative  Negative for appetite change and fever  Eyes: Negative  Respiratory: Negative for shortness of breath  Cardiovascular: Negative  Gastrointestinal: Negative  Endocrine: Negative  Genitourinary: Negative  Musculoskeletal: Negative  Negative for arthralgias and myalgias  Skin: Negative  Allergic/Immunologic: Negative  Neurological: Negative  Hematological: Negative  Negative for adenopathy  Does not bruise/bleed easily  Psychiatric/Behavioral: Negative          Patient Active Problem List   Diagnosis    Personal history of breast cancer    Screening mammogram, encounter for    S/P trigger finger release    Intrinsic muscle tightness    Encounter for follow-up surveillance of breast cancer     Past Medical History:   Diagnosis Date    Arthritis     Diabetes mellitus ([address] )     Disease of thyroid gland     GERD (gastroesophageal reflux disease)     Headache     Hemorrhoids     Hx of radiation therapy 03/09/2012    PBRT    Hypertension     Hypertension     Night sweats     Rheumatoid arthritis (HCC)     Trigger finger     right and left hand    Urinary incontinence     Use of anastrozole (Arimidex)     Use of letrozole (Femara)      Past Surgical History:   Procedure Laterality Date    ABDOMINAL SURGERY      APPENDECTOMY      BRE[facility]SY Left 01/162012    [facility]    BREAST LUMPECTOMY Left 02/17/2012    BREAST SURGERY      CATARACT EXTRACTION      CHOLECYSTECTOMY      FOOT SURGERY      right and left foot    HYSTERECTOMY      JOINT REPLACEMENT      KNEE SURGERY      AL INCISE FINGER TENDON SHEATH Right 8/7/2018    Procedure: RELEASE TRIGGER FINGER - LONG FINGER AND RING FINGER;  Surgeon: Rosario Moreno MD;  [facility];  Service: Orthopedics    AL INCISE FINGER TENDON SHEATH Left 8/14/2018    Procedure: RELEASE TRIGGER FINGER - LEFT INDEX FINGER;  Surgeon: Rosario Moreno MD;  [facility];  Service: Orthopedics    REPAIR RECTOCELE      TONSILLECTOMY      TUBAL LIGATION       Family History   Problem Relation Age of Onset    Prostate cancer Father         age at dx unk    Prostate cancer Brother 68    Thyroid cancer Brother         age at dx unk    Thyroid cancer Daughter 39        age at dx unk    Breast cancer Maternal Aunt 71        age at dx unk    Lung cancer Maternal Uncle         age at dx unk    No Known Problems Son     No Known Problems Son     No Known Problems Son     Pancreatic cancer Maternal Aunt         unknown age     Social History     Socioeconomic History    Marital status: /Civil Union     Spouse name: Not on file    Number of children: Not on file    Years of education: Not on file    Highest education level: Not on file   Occupational History    Not on file   Social Needs    Financial resource strain: Not on file    Food insecurity     Worry: Not on file     Inability: Not on file    Transportation needs     Medical: Not on file     Non-medical: Not on file   Tobacco Use    Smoking status: Never Smoker    Smokeless tobacco: Never Used   Substance and Sexual Activity    Alcohol use: Yes    Drug use: No    Sexual activity: Not on file   Lifestyle    Physical activity     Days per week: Not on file     Minutes per session: Not on file    Stress: Not on file   Relationships    Social connections     Talks on phone: Not on file     Gets together: Not on file     Attends Synagogue service: Not on file     Active member of club or organization: Not on file     Attends meetings of clubs or organizations: Not on file     Relationship status: Not on file    Intimate partner violence     Fear of current or ex partner: Not on file     Emotionally abused: Not on file     Physically abused: Not on file     Forced sexual activity: Not on file   Other Topics Concern    Not on file   Social History Narrative    Not on file       Current Outpatient Medications:     acetaminophen (TYLENOL 8 HOUR) 650 mg CR tablet, Take 1 tablet (650 mg total) by mouth every 8 (eight) hours, Disp: 15 tablet, Rfl: 0    amLODIPine (NORVASC) 10 mg tablet, Take 10 mg by mouth daily  , Disp: , Rfl:     calcium citrate-vitamin D (CITRACAL+D) 315-200 MG-UNIT per tablet, Take 1 tablet by mouth daily  , Disp: , Rfl:     furosemide (LASIX) 40 mg tablet, Take 40 mg by mouth daily  , Disp: , Rfl:     Lancets (OneTouch Delica Plus [id number]) MISC, TEST BLOOD GLUCOSE DAILY, Disp: , Rfl:     losartan (COZAAR) 100 MG tablet, Take 100 mg by mouth daily  , Disp: , Rfl:    metFORMIN (GLUCOPHAGE) 1000 MG tablet, Take 1,000 mg by mouth 2 (two) times a day with meals  , Disp: , Rfl:     Multiple Vitamin (MULTIVITAMIN) tablet, Take 1 tablet by mouth daily  , Disp: , Rfl:     OneTouch Verio test strip, use 1 TEST STRIP to TEST BLOOD SUGAR once daily, Disp: , Rfl:     SYNTHROID 88 MCG tablet, , Disp: , Rfl:   Allergies   Allergen Reactions    Sulfa Antibiotics Rash    Penicillins Rash       The following portions of the patient's history were reviewed and updated as appropriate: allergies, current medications, past family history, past medical history, past social history, past surgical history and problem list         Vitals:    03/11/21 1348   BP: 132/80   Pulse: 78   Temp: 97 8 °F (36 6 °C)       Physical Exam  Constitutional:       General: She is not in acute distress  Appearance: She is well-developed  HENT:      Head: Normocephalic and atraumatic  Cardiovascular:      Heart sounds: Normal heart sounds  Pulmonary:      Breath sounds: Normal breath sounds  Chest:      Breasts:         Right: No inverted nipple, mass, nipple discharge, skin change or tenderness  Left: Skin change (  Lumpectomy scar) present  No inverted nipple, mass, nipple discharge or tenderness  Abdominal:      Palpations: Abdomen is soft  Lymphadenopathy:      Upper Body:      Right upper body: No supraclavicular, axillary or pectoral adenopathy  Left upper body: No supraclavicular, axillary or pectoral adenopathy  Neurological:      Mental Status: She is alert and oriented to person, place, and time  Psychiatric:         Mood and Affect: Mood normal            Results:  Labs:      Imaging   02/08/2021 bilateral 3D screening mammogram is benign BI-RADS two with a density of two    I reviewed the above  imaging data  Discussion/Summary: 59-year-old female status post left breast conservation for stage IA invasive ductal carcinoma  She did have partial breast radiation    She took adjuvant hormone therapy  There is no evidence of disease based on examination today  Her recent mammogram was benign  I will therefore see her again in one year or sooner should the need arise

## 2021-03-23 ENCOUNTER — APPOINTMENT (OUTPATIENT)
Dept: RADIOLOGY | Facility: MEDICAL CENTER | Age: 81
End: 2021-03-23
Payer: MEDICARE

## 2021-03-23 ENCOUNTER — TRANSCRIBE ORDERS (OUTPATIENT)
Dept: ADMINISTRATIVE | Facility: HOSPITAL | Age: 81
End: 2021-03-23

## 2021-03-23 DIAGNOSIS — M25.512 LEFT SHOULDER PAIN, UNSPECIFIED CHRONICITY: Primary | ICD-10-CM

## 2021-03-23 DIAGNOSIS — M25.512 LEFT SHOULDER PAIN, UNSPECIFIED CHRONICITY: ICD-10-CM

## 2021-03-23 PROCEDURE — 73030 X-RAY EXAM OF SHOULDER: CPT

## 2021-03-25 ENCOUNTER — OFFICE VISIT (OUTPATIENT)
Dept: OBGYN CLINIC | Facility: CLINIC | Age: 81
End: 2021-03-25
Payer: MEDICARE

## 2021-03-25 VITALS — BODY MASS INDEX: 42.14 KG/M2 | WEIGHT: 229 LBS | HEIGHT: 62 IN

## 2021-03-25 DIAGNOSIS — S46.012A TRAUMATIC TEAR OF LEFT ROTATOR CUFF, UNSPECIFIED TEAR EXTENT, INITIAL ENCOUNTER: Primary | ICD-10-CM

## 2021-03-25 PROCEDURE — 99204 OFFICE O/P NEW MOD 45 MIN: CPT | Performed by: ORTHOPAEDIC SURGERY

## 2021-03-25 NOTE — PROGRESS NOTES
Assessment:  1  Traumatic tear of left rotator cuff, unspecified tear extent, initial encounter  MRI shoulder right wo contrast     Patient Active Problem List   Diagnosis    Personal history of breast cancer    Screening mammogram, encounter for    S/P trigger finger release    Intrinsic muscle tightness    Encounter for follow-up surveillance of breast cancer           Plan       MRI will be ordered I have a suspicion for rotator cuff tear I can not be certain how much is torn and how great leave the tear is I do suggested MRI would be necessary  Subjective:     Patient ID:    Chief Lizet Whitehead 79 y o  female      HPI    Patient comes in today with regards to Left shoulder  Tripped and fell onto the left shoulder  Pain was right away  The patient reports that the pain is in the   Throughout the shoulder and has been going on for  4 days  Patient has no improvement  The pain is rated at0 at its best and5 at its worst   The pain is described as  painful  It is worsened with Lifting and, and is made better with  Ice and resting  The patient has taken  Advil for treatment  No previous issues or injuries with this shoulder in the past   No previous surgeries        The following portions of the patient's history were reviewed and updated as appropriate: allergies, current medications, past family history, past social history, past surgical history and problem list         Social History     Socioeconomic History    Marital status: /Civil Union     Spouse name: Not on file    Number of children: Not on file    Years of education: Not on file    Highest education level: Not on file   Occupational History    Not on file   Social Needs    Financial resource strain: Not on file    Food insecurity     Worry: Not on file     Inability: Not on file    Transportation needs     Medical: Not on file     Non-medical: Not on file   Tobacco Use    Smoking status: Never Smoker  Smokeless tobacco: Never Used   Substance and Sexual Activity    Alcohol use:  Yes    Drug use: No    Sexual activity: Not on file   Lifestyle    Physical activity     Days per week: Not on file     Minutes per session: Not on file    Stress: Not on file   Relationships    Social connections     Talks on phone: Not on file     Gets together: Not on file     Attends Yarsani service: Not on file     Active member of club or organization: Not on file     Attends meetings of clubs or organizations: Not on file     Relationship status: Not on file    Intimate partner violence     Fear of current or ex partner: Not on file     Emotionally abused: Not on file     Physically abused: Not on file     Forced sexual activity: Not on file   Other Topics Concern    Not on file   Social History Narrative    Not on file     Past Medical History:   Diagnosis Date    Arthritis     Diabetes mellitus ([facility] [address] )     Disease of thyroid gland     GERD (gastroesophageal reflux disease)     Headache     Hemorrhoids     Hx of radiation therapy 03/09/2012    PBRT    Hypertension     Hypertension     Night sweats     Rheumatoid arthritis ([facility] [address]     Trigger finger     right and left hand    Urinary incontinence     Use of anastrozole (Arimidex)     Use of letrozole (Femara)      Past Surgical History:   Procedure Laterality Date    ABDOMINAL SURGERY      APPENDECTOMY      [facility]SY Left 01/162012    [facility]    BREAST LUMPECTOMY Left 02/17/2012    BREAST SURGERY      CATARACT EXTRACTION      CHOLECYSTECTOMY      FOOT SURGERY      right and left foot    HYSTERECTOMY      JOINT REPLACEMENT      KNEE SURGERY      NE INCISE FINGER TENDON SHEATH Right 8/7/2018    Procedure: RELEASE TRIGGER FINGER - [place];  Surgeon: Vandy Runner, MD;  [facility];  Service: Orthopedics    NE INCISE FINGER TENDON SHEATH Left 8/14/2018    Procedure: RELEASE TRIGGER FINGER - LEFT INDEX FINGER; Surgeon: Luanne Callahan MD;  [facility];  Service: Orthopedics    REPAIR RECTOCELE      TONSILLECTOMY      TUBAL LIGATION       Allergies   Allergen Reactions    Sulfa Antibiotics Rash    Penicillins Rash     Current Outpatient Medications on File Prior to Visit   Medication Sig Dispense Refill    acetaminophen (TYLENOL 8 HOUR) 650 mg CR tablet Take 1 tablet (650 mg total) by mouth every 8 (eight) hours 15 tablet 0    amLODIPine (NORVASC) 10 mg tablet Take 10 mg by mouth daily   calcium citrate-vitamin D (CITRACAL+D) 315-200 MG-UNIT per tablet Take 1 tablet by mouth daily   furosemide (LASIX) 40 mg tablet Take 40 mg by mouth daily   Lancets (OneTouch Delica Plus [id number]) MISC TEST BLOOD GLUCOSE DAILY      losartan (COZAAR) 100 MG tablet Take 100 mg by mouth daily   metFORMIN (GLUCOPHAGE) 1000 MG tablet Take 1,000 mg by mouth 2 (two) times a day with meals   Multiple Vitamin (MULTIVITAMIN) tablet Take 1 tablet by mouth daily   OneTouch Verio test strip use 1 TEST STRIP to TEST BLOOD SUGAR once daily      SYNTHROID 88 MCG tablet        No current facility-administered medications on file prior to visit  Objective:    Review of Systems   Constitutional: Negative  HENT: Negative  Eyes: Negative  Respiratory: Negative  Cardiovascular: Negative  Gastrointestinal: Negative  Endocrine: Negative  Genitourinary: Negative  Musculoskeletal: Positive for arthralgias and myalgias  See HPI   Skin: Negative  Allergic/Immunologic: Negative  Neurological: Negative  Hematological: Negative  Psychiatric/Behavioral: Negative  Right Shoulder Exam     Range of Motion   Active abduction: normal   Right shoulder external rotation: T3    Forward flexion: normal   Internal rotation 0 degrees: T8     Muscle Strength   The patient has normal right shoulder strength        Left Shoulder Exam     Range of Motion   Active abduction: normal   Left shoulder external rotation: T1    Forward flexion: 170   Internal rotation 0 degrees: T11     Muscle Strength   Internal rotation: 5/5   External rotation: 5/5   Supraspinatus: 4/5   Subscapularis: 5/5   Biceps: 5/5     Tests   Summers test: positive    Other   Erythema: absent  Sensation: normal  Pulse: present     Comments:   Patient does have pain with resisted external rotation at 90° but not at 0°  She also some weakness here as well as the empty can resistance test            Physical Exam  Vitals signs and nursing note reviewed  Constitutional:       Appearance: She is well-developed  HENT:      Head: Normocephalic  Eyes:      Pupils: Pupils are equal, round, and reactive to light  Neck:      Musculoskeletal: Normal range of motion  Cardiovascular:      Rate and Rhythm: Normal rate  Pulmonary:      Effort: Pulmonary effort is normal    Abdominal:      General: There is no distension  Skin:     General: Skin is warm  Neurological:      Mental Status: She is alert and oriented to person, place, and time  Procedures  No Procedures performed today    I have personally reviewed pertinent films in PACS and my interpretation is X-ray show increased space in the superior humeral space may indicate effusion or hemarthrosis  Portions of the record may have been created with voice recognition software   Occasional wrong word or "sound a like" substitutions may have occurred due to the inherent limitations of voice recognition software   Read the chart carefully and recognize, using context, where substitutions have occurred

## 2021-04-01 ENCOUNTER — APPOINTMENT (EMERGENCY)
Dept: RADIOLOGY | Facility: HOSPITAL | Age: 81
End: 2021-04-01
Payer: MEDICARE

## 2021-04-01 ENCOUNTER — HOSPITAL ENCOUNTER (OUTPATIENT)
Facility: HOSPITAL | Age: 81
Setting detail: OBSERVATION
Discharge: HOME | End: 2021-04-03
Attending: EMERGENCY MEDICINE | Admitting: INTERNAL MEDICINE
Payer: MEDICARE

## 2021-04-01 ENCOUNTER — APPOINTMENT (EMERGENCY)
Dept: CT IMAGING | Facility: HOSPITAL | Age: 81
End: 2021-04-01
Payer: MEDICARE

## 2021-04-01 DIAGNOSIS — M19.90 ARTHRITIS: ICD-10-CM

## 2021-04-01 DIAGNOSIS — K21.9 GASTROESOPHAGEAL REFLUX DISEASE WITHOUT ESOPHAGITIS: ICD-10-CM
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
## 2021-04-01 DIAGNOSIS — R55 NEAR SYNCOPE: Primary | ICD-10-CM

## 2021-04-01 DIAGNOSIS — M25.511 ACUTE PAIN OF RIGHT SHOULDER: ICD-10-CM

## 2021-04-01 LAB
ALBUMIN SERPL BCP-MCNC: 4.2 G/DL (ref 3.4–4.8)
ALP SERPL-CCNC: 101.2 U/L (ref 35–140)
ALT SERPL W P-5'-P-CCNC: 52 U/L (ref 5–54)
ANION GAP SERPL CALCULATED.3IONS-SCNC: 12 MMOL/L (ref 4–13)
AST SERPL W P-5'-P-CCNC: 52 U/L (ref 15–41)
BASOPHILS # BLD AUTO: 0.04 THOUSANDS/ΜL (ref 0–0.1)
BASOPHILS NFR BLD AUTO: 0 % (ref 0–1)
BILIRUB SERPL-MCNC: 0.41 MG/DL (ref 0.3–1.2)
BUN SERPL-MCNC: 15 MG/DL (ref 6–20)
CALCIUM SERPL-MCNC: 9.6 MG/DL (ref 8.4–10.2)
CHLORIDE SERPL-SCNC: 101 MMOL/L (ref 96–108)
CHOLEST SERPL-MCNC: 196 MG/DL
CO2 SERPL-SCNC: 27 MMOL/L (ref 22–33)
CREAT SERPL-MCNC: 0.84 MG/DL (ref 0.4–1.1)
EOSINOPHIL # BLD AUTO: 0.09 THOUSAND/ΜL (ref 0–0.61)
EOSINOPHIL NFR BLD AUTO: 1 % (ref 0–6)
ERYTHROCYTE [DISTWIDTH] IN BLOOD BY AUTOMATED COUNT: 14.3 % (ref 11.6–15.1)
GFR SERPL CREATININE-BSD FRML MDRD: 65 ML/MIN/1.73SQ M
GLUCOSE SERPL-MCNC: 210 MG/DL (ref 65–140)
HCT VFR BLD AUTO: 42.9 % (ref 34.8–46.1)
HDLC SERPL-MCNC: 49 MG/DL
HGB BLD-MCNC: 14.2 G/DL (ref 11.5–15.4)
IMM GRANULOCYTES # BLD AUTO: 0.05 THOUSAND/UL (ref 0–0.2)
IMM GRANULOCYTES NFR BLD AUTO: 1 % (ref 0–2)
LDLC SERPL CALC-MCNC: 102 MG/DL (ref 0–100)
LYMPHOCYTES # BLD AUTO: 2.42 THOUSANDS/ΜL (ref 0.6–4.47)
LYMPHOCYTES NFR BLD AUTO: 26 % (ref 14–44)
MAGNESIUM SERPL-MCNC: 1.6 MG/DL (ref 1.6–2.6)
MCH RBC QN AUTO: 29.5 PG (ref 26.8–34.3)
MCHC RBC AUTO-ENTMCNC: 33.1 G/DL (ref 31.4–37.4)
MCV RBC AUTO: 89 FL (ref 82–98)
MONOCYTES # BLD AUTO: 0.48 THOUSAND/ΜL (ref 0.17–1.22)
MONOCYTES NFR BLD AUTO: 5 % (ref 4–12)
NEUTROPHILS # BLD AUTO: 6.17 THOUSANDS/ΜL (ref 1.85–7.62)
NEUTS SEG NFR BLD AUTO: 67 % (ref 43–75)
PLATELET # BLD AUTO: 257 THOUSANDS/UL (ref 149–390)
PMV BLD AUTO: 10 FL (ref 8.9–12.7)
POTASSIUM SERPL-SCNC: 3.1 MMOL/L (ref 3.5–5)
PROT SERPL-MCNC: 7.4 G/DL (ref 6.4–8.3)
RBC # BLD AUTO: 4.81 MILLION/UL (ref 3.81–5.12)
SODIUM SERPL-SCNC: 140 MMOL/L (ref 133–145)
TRIGL SERPL-MCNC: 224.7 MG/DL
TROPONIN I SERPL-MCNC: <0.03 NG/ML (ref 0–0.07)
TROPONIN I SERPL-MCNC: <0.03 NG/ML (ref 0–0.07)
TSH SERPL DL<=0.05 MIU/L-ACNC: 1.05 UIU/ML (ref 0.34–5.6)
WBC # BLD AUTO: 9.25 THOUSAND/UL (ref 4.31–10.16)

## 2021-04-01 PROCEDURE — 99285 EMERGENCY DEPT VISIT HI MDM: CPT

## 2021-04-01 PROCEDURE — 84484 ASSAY OF TROPONIN QUANT: CPT | Performed by: INTERNAL MEDICINE

## 2021-04-01 PROCEDURE — 73030 X-RAY EXAM OF SHOULDER: CPT

## 2021-04-01 PROCEDURE — G1004 CDSM NDSC: HCPCS

## 2021-04-01 PROCEDURE — 85025 COMPLETE CBC W/AUTO DIFF WBC: CPT | Performed by: EMERGENCY MEDICINE

## 2021-04-01 PROCEDURE — 96360 HYDRATION IV INFUSION INIT: CPT

## 2021-04-01 PROCEDURE — 99285 EMERGENCY DEPT VISIT HI MDM: CPT | Performed by: EMERGENCY MEDICINE

## 2021-04-01 PROCEDURE — 93005 ELECTROCARDIOGRAM TRACING: CPT

## 2021-04-01 PROCEDURE — 1124F ACP DISCUSS-NO DSCNMKR DOCD: CPT | Performed by: EMERGENCY MEDICINE

## 2021-04-01 PROCEDURE — 70450 CT HEAD/BRAIN W/O DYE: CPT

## 2021-04-01 PROCEDURE — 83735 ASSAY OF MAGNESIUM: CPT | Performed by: INTERNAL MEDICINE

## 2021-04-01 PROCEDURE — 83036 HEMOGLOBIN GLYCOSYLATED A1C: CPT | Performed by: INTERNAL MEDICINE

## 2021-04-01 PROCEDURE — 80061 LIPID PANEL: CPT | Performed by: INTERNAL MEDICINE

## 2021-04-01 PROCEDURE — 36415 COLL VENOUS BLD VENIPUNCTURE: CPT | Performed by: EMERGENCY MEDICINE

## 2021-04-01 PROCEDURE — 84484 ASSAY OF TROPONIN QUANT: CPT | Performed by: EMERGENCY MEDICINE

## 2021-04-01 PROCEDURE — 80053 COMPREHEN METABOLIC PANEL: CPT | Performed by: EMERGENCY MEDICINE

## 2021-04-01 PROCEDURE — 99220 PR INITIAL OBSERVATION CARE/DAY 70 MINUTES: CPT | Performed by: INTERNAL MEDICINE

## 2021-04-01 PROCEDURE — 82948 REAGENT STRIP/BLOOD GLUCOSE: CPT

## 2021-04-01 PROCEDURE — 84443 ASSAY THYROID STIM HORMONE: CPT | Performed by: INTERNAL MEDICINE

## 2021-04-01 PROCEDURE — 94762 N-INVAS EAR/PLS OXIMTRY CONT: CPT

## 2021-04-01 PROCEDURE — 71045 X-RAY EXAM CHEST 1 VIEW: CPT

## 2021-04-01 RX ORDER — LEVOTHYROXINE SODIUM 88 UG/1
88 TABLET ORAL
Status: DISCONTINUED | OUTPATIENT
Start: 2021-04-02 | End: 2021-04-03 | Stop reason: HOSPADM

## 2021-04-01 RX ORDER — POTASSIUM CHLORIDE 20 MEQ/1
20 TABLET, EXTENDED RELEASE ORAL ONCE
Status: COMPLETED | OUTPATIENT
Start: 2021-04-01 | End: 2021-04-01

## 2021-04-01 RX ORDER — ACETAMINOPHEN 325 MG/1
650 TABLET ORAL EVERY 6 HOURS PRN
Status: DISCONTINUED | OUTPATIENT
Start: 2021-04-01 | End: 2021-04-03 | Stop reason: HOSPADM

## 2021-04-01 RX ORDER — FUROSEMIDE 40 MG/1
40 TABLET ORAL DAILY
Status: DISCONTINUED | OUTPATIENT
Start: 2021-04-02 | End: 2021-04-01

## 2021-04-01 RX ORDER — POTASSIUM CHLORIDE 14.9 MG/ML
20 INJECTION INTRAVENOUS ONCE
Status: COMPLETED | OUTPATIENT
Start: 2021-04-01 | End: 2021-04-01

## 2021-04-01 RX ORDER — BISACODYL 10 MG
10 SUPPOSITORY, RECTAL RECTAL DAILY PRN
Status: DISCONTINUED | OUTPATIENT
Start: 2021-04-01 | End: 2021-04-03 | Stop reason: HOSPADM

## 2021-04-01 RX ORDER — LIDOCAINE 50 MG/G
1 PATCH TOPICAL DAILY
Status: DISCONTINUED | OUTPATIENT
Start: 2021-04-02 | End: 2021-04-03 | Stop reason: HOSPADM

## 2021-04-01 RX ORDER — MAGNESIUM SULFATE HEPTAHYDRATE 40 MG/ML
2 INJECTION, SOLUTION INTRAVENOUS ONCE
Status: COMPLETED | OUTPATIENT
Start: 2021-04-01 | End: 2021-04-02

## 2021-04-01 RX ORDER — LOSARTAN POTASSIUM 50 MG/1
100 TABLET ORAL DAILY
Status: DISCONTINUED | OUTPATIENT
Start: 2021-04-02 | End: 2021-04-01

## 2021-04-01 RX ORDER — ACETAMINOPHEN 325 MG/1
975 TABLET ORAL ONCE
Status: COMPLETED | OUTPATIENT
Start: 2021-04-01 | End: 2021-04-01

## 2021-04-01 RX ORDER — POTASSIUM CHLORIDE 29.8 MG/ML
40 INJECTION INTRAVENOUS ONCE
Status: DISCONTINUED | OUTPATIENT
Start: 2021-04-01 | End: 2021-04-01

## 2021-04-01 RX ORDER — IBUPROFEN 400 MG/1
400 TABLET ORAL ONCE
Status: COMPLETED | OUTPATIENT
Start: 2021-04-01 | End: 2021-04-01

## 2021-04-01 RX ORDER — AMLODIPINE BESYLATE 10 MG/1
10 TABLET ORAL DAILY
Status: DISCONTINUED | OUTPATIENT
Start: 2021-04-02 | End: 2021-04-03 | Stop reason: HOSPADM

## 2021-04-01 RX ORDER — SODIUM CHLORIDE, SODIUM GLUCONATE, SODIUM ACETATE, POTASSIUM CHLORIDE, MAGNESIUM CHLORIDE, SODIUM PHOSPHATE, DIBASIC, AND POTASSIUM PHOSPHATE .53; .5; .37; .037; .03; .012; .00082 G/100ML; G/100ML; G/100ML; G/100ML; G/100ML; G/100ML; G/100ML
75 INJECTION, SOLUTION INTRAVENOUS CONTINUOUS
Status: DISPENSED | OUTPATIENT
Start: 2021-04-01 | End: 2021-04-02

## 2021-04-01 RX ORDER — POTASSIUM CHLORIDE 14.9 MG/ML
20 INJECTION INTRAVENOUS ONCE
Status: COMPLETED | OUTPATIENT
Start: 2021-04-01 | End: 2021-04-02

## 2021-04-01 RX ORDER — PANTOPRAZOLE SODIUM 40 MG/1
40 TABLET, DELAYED RELEASE ORAL
Status: DISCONTINUED | OUTPATIENT
Start: 2021-04-02 | End: 2021-04-03 | Stop reason: HOSPADM

## 2021-04-01 RX ADMIN — POTASSIUM CHLORIDE 20 MEQ: 14.9 INJECTION, SOLUTION INTRAVENOUS at 21:32

## 2021-04-01 RX ADMIN — SODIUM CHLORIDE, SODIUM GLUCONATE, SODIUM ACETATE, POTASSIUM CHLORIDE, MAGNESIUM CHLORIDE, SODIUM PHOSPHATE, DIBASIC, AND POTASSIUM PHOSPHATE 75 ML/HR: .53; .5; .37; .037; .03; .012; .00082 INJECTION, SOLUTION INTRAVENOUS at 21:32

## 2021-04-01 RX ADMIN — POTASSIUM CHLORIDE 20 MEQ: 1500 TABLET, EXTENDED RELEASE ORAL at 21:31

## 2021-04-01 RX ADMIN — SODIUM CHLORIDE 1000 ML: 0.9 INJECTION, SOLUTION INTRAVENOUS at 17:39

## 2021-04-01 RX ADMIN — POTASSIUM CHLORIDE 20 MEQ: 14.9 INJECTION, SOLUTION INTRAVENOUS at 22:16

## 2021-04-01 RX ADMIN — ACETAMINOPHEN 975 MG: 325 TABLET, FILM COATED ORAL at 18:29

## 2021-04-01 RX ADMIN — IBUPROFEN 400 MG: 400 TABLET ORAL at 21:30

## 2021-04-01 NOTE — ED TRIAGE NOTES
Pt lives at home, had a fall today  Pt was standing by counter, felt dizzy, and fell  Pt unsure if she hit her head  Pt had a prior fall last week, possibly tore her L rotator cuff  Pt now today c/o R shoulder pain as well

## 2021-04-01 NOTE — ED PROVIDER NOTES
History  Chief Complaint   Patient presents with    Fall    Shoulder Pain     HPI    80 yo F hx of Dm GERD HTN RA Dm  presents from home, to the ed for eval after a fall  Patient fell just pta  Patient got up from chair, when she felt lightheaded and fell  Patient unsure if she hit her head  No loc  No cuts  Only complains of right shoulder pain  No neuro complaints  Localized  Non radiating 5/10  Exacerbated with movement  Lives at home with  and daughter    Prior to Admission Medications   Prescriptions Last Dose Informant Patient Reported? Taking? Lancets (OneTouch Delica Plus [id number]) MISC  Self Yes No   Sig: TEST BLOOD GLUCOSE DAILY   Multiple Vitamin (MULTIVITAMIN) tablet  Self Yes No   Sig: Take 1 tablet by mouth daily  OneTouch Verio test strip  Self Yes No   Sig: use 1 TEST STRIP to TEST BLOOD SUGAR once daily   SYNTHROID 88 MCG tablet  Self Yes No   acetaminophen (TYLENOL 8 HOUR) 650 mg CR tablet  Self No No   Sig: Take 1 tablet (650 mg total) by mouth every 8 (eight) hours   amLODIPine (NORVASC) 10 mg tablet  Self Yes No   Sig: Take 10 mg by mouth daily  calcium citrate-vitamin D (CITRACAL+D) 315-200 MG-UNIT per tablet  Self Yes No   Sig: Take 1 tablet by mouth daily  furosemide (LASIX) 40 mg tablet  Self Yes No   Sig: Take 40 mg by mouth daily  losartan (COZAAR) 100 MG tablet  Self Yes No   Sig: Take 100 mg by mouth daily  metFORMIN (GLUCOPHAGE) 1000 MG tablet  Self Yes No   Sig: Take 1,000 mg by mouth 2 (two) times a day with meals        Facility-Administered Medications: None       Past Medical History:   Diagnosis Date    Arthritis     Diabetes mellitus ([address] )     Disease of thyroid gland     GERD (gastroesophageal reflux disease)     Headache     Hemorrhoids     Hx of radiation therapy 03/09/2012    PBRT    Hypertension     Hypertension     Night sweats     Rheumatoid arthritis (HCC)     Trigger finger     right and left hand    Urinary incontinence     Use of anastrozole (Arimidex)     Use of letrozole (Femara)        Past Surgical History:   Procedure Laterality Date    ABDOMINAL SURGERY      APPENDECTOMY      BREAST BIOPSY Left 01/162012    [facility]    BREAST LUMPECTOMY Left 02/17/2012    BREAST SURGERY      CATARACT EXTRACTION      CHOLECYSTECTOMY      FOOT SURGERY      right and left foot    HYSTERECTOMY      JOINT REPLACEMENT      KNEE SURGERY      IL INCISE FINGER TENDON SHEATH Right 8/7/2018    Procedure: RELEASE TRIGGER FINGER - LONG FINGER AND RING FINGER;  Surgeon: Trudi Haskins MD;  [facility];  Service: Orthopedics    IL INCISE FINGER TENDON SHEATH Left 8/14/2018    Procedure: RELEASE TRIGGER FINGER - LEFT INDEX FINGER;  Surgeon: Trudi Haskins MD;  [facility];  Service: Orthopedics    REPAIR RECTOCELE      TONSILLECTOMY      TUBAL LIGATION         Family History   Problem Relation Age of Onset    Prostate cancer Father         age at dx unk    Prostate cancer Brother 68    Thyroid cancer Brother         age at dx unk    Thyroid cancer Daughter 39        age at dx unk    Breast cancer Maternal Aunt 71        age at dx unk    Lung cancer Maternal Uncle         age at dx unk    No Known Problems Son     No Known Problems Son     No Known Problems Son     Pancreatic cancer Maternal Aunt         unknown age     I have reviewed and agree with the history as documented  E-Cigarette/Vaping     E-Cigarette/Vaping Substances     Social History     Tobacco Use    Smoking status: Never Smoker    Smokeless tobacco: Never Used   Substance Use Topics    Alcohol use: Yes    Drug use: No       Review of Systems   Constitutional: Negative for chills, fatigue and fever  HENT: Negative for sore throat  Eyes: Negative for redness and visual disturbance  Respiratory: Negative for cough and shortness of breath  Cardiovascular: Negative for chest pain     Gastrointestinal: Negative for abdominal pain, diarrhea and nausea  Genitourinary: Negative for difficulty urinating, dysuria and pelvic pain  Musculoskeletal: Positive for arthralgias  Negative for back pain  Right shoulder pain   Skin: Negative for rash  Neurological: Positive for dizziness and light-headedness  Negative for syncope, weakness and headaches  All other systems reviewed and are negative  Physical Exam  Physical Exam  Vitals signs and nursing note reviewed  Constitutional:       General: She is not in acute distress  HENT:      Head: Normocephalic and atraumatic  Eyes:      Conjunctiva/sclera: Conjunctivae normal    Neck:      Musculoskeletal: Normal range of motion  Cardiovascular:      Rate and Rhythm: Normal rate and regular rhythm  Heart sounds: Normal heart sounds  Pulmonary:      Effort: Pulmonary effort is normal  No respiratory distress  Breath sounds: Normal breath sounds  Abdominal:      General: Bowel sounds are normal       Palpations: Abdomen is soft  Tenderness: There is no abdominal tenderness  Musculoskeletal: Normal range of motion  Comments: On examination:  Patient has Limited rom of right shoulder  No overlying skin changes, or signs of trauma  No laxity of the joint  Distally neurovascularly in tact  Compartments soft    Tenderness on palpation of right gh joint       Skin:     General: Skin is warm and dry  Findings: No rash  Neurological:      Mental Status: She is alert and oriented to person, place, and time  Cranial Nerves: No cranial nerve deficit  Sensory: No sensory deficit  Motor: No abnormal muscle tone        Coordination: Coordination normal       Comments: Unable to ambulate without falling         Vital Signs  ED Triage Vitals   Temperature Pulse Respirations Blood Pressure SpO2   04/01/21 1701 04/01/21 1659 04/01/21 1659 04/01/21 1659 04/01/21 1659   98 2 °F (36 8 °C) 92 18 149/65 99 %      Temp src Heart Rate Source Patient Position - Orthostatic VS BP Location FiO2 (%)   -- -- -- -- --             Pain Score       --                  Vitals:    04/01/21 1659   BP: 149/65   Pulse: 92         Visual Acuity      ED Medications  Medications   sodium chloride 0 9 % bolus 1,000 mL (1,000 mL Intravenous New Bag 4/1/21 1739)   acetaminophen (TYLENOL) tablet 975 mg (975 mg Oral Given 4/1/21 1829)       Diagnostic Studies  Results Reviewed     Procedure Component Value Units Date/Time    Troponin I [[id number]]  (Normal) Collected: 04/01/21 1738    Lab Status: Final result Specimen: Blood from Arm, Right Updated: 04/01/21 1801     Troponin I <0 03 ng/mL     Comprehensive metabolic panel [[id number]]  (Abnormal) Collected: 04/01/21 1738    Lab Status: Final result Specimen: Blood from Arm, Right Updated: 04/01/21 1758     Sodium 140 mmol/L      Potassium 3 1 mmol/L      Chloride 101 mmol/L      CO2 27 mmol/L      ANION GAP 12 mmol/L      BUN 15 mg/dL      Creatinine 0 84 mg/dL      Glucose 210 mg/dL      Calcium 9 6 mg/dL      AST 52 U/L      ALT 52 U/L      Alkaline Phosphatase 101 2 U/L      Total Protein 7 4 g/dL      Albumin 4 2 g/dL      Total Bilirubin 0 41 mg/dL      eGFR 65 ml/min/1 73sq m     Narrative:      [place] guidelines for Chronic Kidney Disease (CKD):     Stage 1 with normal or high GFR (GFR > 90 mL/min/1 73 square meters)    Stage 2 Mild CKD (GFR = 60-89 mL/min/1 73 square meters)    Stage 3A Moderate CKD (GFR = 45-59 mL/min/1 73 square meters)    Stage 3B Moderate CKD (GFR = 30-44 mL/min/1 73 square meters)    Stage 4 Severe CKD (GFR = 15-29 mL/min/1 73 square meters)    Stage 5 End Stage CKD (GFR <15 mL/min/1 73 square meters)  Note: GFR calculation is accurate only with a steady state creatinine    CBC and differential [[id number]]  (Normal) Collected: 04/01/21 1738    Lab Status: Final result Specimen: Blood from Arm, Right Updated: 04/01/21 1744     WBC 9 25 Thousand/uL      RBC 4 81 Million/uL      Hemoglobin 14 2 g/dL      Hematocrit 42 9 %      MCV 89 fL      MCH 29 5 pg      MCHC 33 1 g/dL      RDW 14 3 %      MPV 10 0 fL      Platelets 920 Thousands/uL      Neutrophils Relative 67 %      Immat GRANS % 1 %      Lymphocytes Relative 26 %      Monocytes Relative 5 %      Eosinophils Relative 1 %      Basophils Relative 0 %      Neutrophils Absolute 6 17 Thousands/µL      Immature Grans Absolute 0 05 Thousand/uL      Lymphocytes Absolute 2 42 Thousands/µL      Monocytes Absolute 0 48 Thousand/µL      Eosinophils Absolute 0 09 Thousand/µL      Basophils Absolute 0 04 Thousands/µL     UA (URINE) with reflex to Scope [[id number]]     Lab Status: No result Specimen: Urine                  CT head without contrast   Final Result by Lynette Franklin MD (04/01 1835)      No acute intracranial abnormality  Workstation performed: [id number]         XR chest 1 view portable    (Results Pending)   XR shoulder 2+ views RIGHT    (Results Pending)              Procedures  Procedures         ED Course               MDM    78 yo F with near syncope, presents after fall  No loc  No acute findings on imaging, cardiac work up unremarkable  Mild hypokalemia  Admitted to medicine for ambulatory dysfunction and near syncope  Disposition  Final diagnoses:   Fall, initial encounter   Acute pain of right shoulder   Near syncope     Time reflects when diagnosis was documented in both MDM as applicable and the Disposition within this note     Time User Action Codes Description Comment    4/1/2021  6:36 PM Yoko Moritz Mitchell Chapin  YKJH] Fall, initial encounter     4/1/2021  6:36 PM Erroll Bonds Add [M25 511] Acute pain of right shoulder     4/1/2021  6:36 PM Erroll Bonds Add [R55] Near syncope     4/1/2021  6:36 PM Erroll Bonds Modify [A92  VDKZ] Fall, initial encounter     4/1/2021  6:36 PM Erroll Bonds Modify [R55] Near syncope       ED Disposition     ED Disposition Condition Date/Time Comment    Admit Stable Thu Apr 1, 2021  6:36 PM Case was discussed with JANET and the patient's admission status was agreed to be Admission Status: observation status to the service of Dr Kalyani Croft  Follow-up Information    None         Patient's Medications   Discharge Prescriptions    No medications on file     No discharge procedures on file      PDMP Review     None          ED Provider  Electronically Signed by     Victoria Acuna MD  04/01/21 7634

## 2021-04-02 ENCOUNTER — APPOINTMENT (OUTPATIENT)
Dept: NON INVASIVE DIAGNOSTICS | Facility: HOSPITAL | Age: 81
End: 2021-04-02
Payer: MEDICARE

## 2021-04-02 ENCOUNTER — APPOINTMENT (OUTPATIENT)
Dept: CT IMAGING | Facility: HOSPITAL | Age: 81
End: 2021-04-02
Payer: MEDICARE

## 2021-04-02 ENCOUNTER — APPOINTMENT (OUTPATIENT)
Dept: VASCULAR ULTRASOUND | Facility: HOSPITAL | Age: 81
End: 2021-04-02
Payer: MEDICARE

## 2021-04-02 PROBLEM — M25.511 RIGHT SHOULDER PAIN: Status: ACTIVE | Noted: 2021-04-02

## 2021-04-02 LAB
ALBUMIN SERPL BCP-MCNC: 3.7 G/DL (ref 3.4–4.8)
ALP SERPL-CCNC: 93.1 U/L (ref 35–140)
ALT SERPL W P-5'-P-CCNC: 42 U/L (ref 5–54)
ANION GAP SERPL CALCULATED.3IONS-SCNC: 10 MMOL/L (ref 4–13)
AST SERPL W P-5'-P-CCNC: 37 U/L (ref 15–41)
ATRIAL RATE: 84 BPM
BASOPHILS # BLD AUTO: 0.02 THOUSANDS/ΜL (ref 0–0.1)
BASOPHILS NFR BLD AUTO: 0 % (ref 0–1)
BILIRUB SERPL-MCNC: 0.61 MG/DL (ref 0.3–1.2)
BILIRUB UR QL STRIP: NEGATIVE
BUN SERPL-MCNC: 12 MG/DL (ref 6–20)
CALCIUM SERPL-MCNC: 9.1 MG/DL (ref 8.4–10.2)
CHLORIDE SERPL-SCNC: 106 MMOL/L (ref 96–108)
CLARITY UR: CLEAR
CO2 SERPL-SCNC: 27 MMOL/L (ref 22–33)
COLOR UR: YELLOW
CREAT SERPL-MCNC: 0.73 MG/DL (ref 0.4–1.1)
EOSINOPHIL # BLD AUTO: 0.15 THOUSAND/ΜL (ref 0–0.61)
EOSINOPHIL NFR BLD AUTO: 2 % (ref 0–6)
ERYTHROCYTE [DISTWIDTH] IN BLOOD BY AUTOMATED COUNT: 14.5 % (ref 11.6–15.1)
EST. AVERAGE GLUCOSE BLD GHB EST-MCNC: 146 MG/DL
GFR SERPL CREATININE-BSD FRML MDRD: 77 ML/MIN/1.73SQ M
GLUCOSE P FAST SERPL-MCNC: 124 MG/DL (ref 70–105)
GLUCOSE SERPL-MCNC: 123 MG/DL (ref 65–140)
GLUCOSE SERPL-MCNC: 124 MG/DL (ref 65–140)
GLUCOSE SERPL-MCNC: 124 MG/DL (ref 65–140)
GLUCOSE SERPL-MCNC: 135 MG/DL (ref 65–140)
GLUCOSE SERPL-MCNC: 147 MG/DL (ref 65–140)
GLUCOSE SERPL-MCNC: 147 MG/DL (ref 65–140)
GLUCOSE UR STRIP-MCNC: NEGATIVE MG/DL
HBA1C MFR BLD: 6.7 %
HCT VFR BLD AUTO: 39.9 % (ref 34.8–46.1)
HGB BLD-MCNC: 13.2 G/DL (ref 11.5–15.4)
HGB UR QL STRIP.AUTO: NEGATIVE
IMM GRANULOCYTES # BLD AUTO: 0.02 THOUSAND/UL (ref 0–0.2)
IMM GRANULOCYTES NFR BLD AUTO: 0 % (ref 0–2)
KETONES UR STRIP-MCNC: NEGATIVE MG/DL
LEUKOCYTE ESTERASE UR QL STRIP: NEGATIVE
LYMPHOCYTES # BLD AUTO: 3.87 THOUSANDS/ΜL (ref 0.6–4.47)
LYMPHOCYTES NFR BLD AUTO: 47 % (ref 14–44)
MCH RBC QN AUTO: 29.6 PG (ref 26.8–34.3)
MCHC RBC AUTO-ENTMCNC: 33.1 G/DL (ref 31.4–37.4)
MCV RBC AUTO: 90 FL (ref 82–98)
MONOCYTES # BLD AUTO: 0.58 THOUSAND/ΜL (ref 0.17–1.22)
MONOCYTES NFR BLD AUTO: 7 % (ref 4–12)
NEUTROPHILS # BLD AUTO: 3.6 THOUSANDS/ΜL (ref 1.85–7.62)
NEUTS SEG NFR BLD AUTO: 44 % (ref 43–75)
NITRITE UR QL STRIP: NEGATIVE
P AXIS: 49 DEGREES
PH UR STRIP.AUTO: 6 [PH]
PLATELET # BLD AUTO: 255 THOUSANDS/UL (ref 149–390)
PMV BLD AUTO: 10.3 FL (ref 8.9–12.7)
POTASSIUM SERPL-SCNC: 3.4 MMOL/L (ref 3.5–5)
PR INTERVAL: 162 MS
PROT SERPL-MCNC: 6.7 G/DL (ref 6.4–8.3)
PROT UR STRIP-MCNC: NEGATIVE MG/DL
QRS AXIS: 5 DEGREES
QRSD INTERVAL: 102 MS
QT INTERVAL: 405 MS
QTC INTERVAL: 482 MS
RBC # BLD AUTO: 4.46 MILLION/UL (ref 3.81–5.12)
SODIUM SERPL-SCNC: 143 MMOL/L (ref 133–145)
SP GR UR STRIP.AUTO: 1.02 (ref 1–1.03)
T WAVE AXIS: 41 DEGREES
TROPONIN I SERPL-MCNC: <0.03 NG/ML (ref 0–0.07)
UROBILINOGEN UR QL STRIP.AUTO: 0.2 E.U./DL
VENTRICULAR RATE: 85 BPM
WBC # BLD AUTO: 8.24 THOUSAND/UL (ref 4.31–10.16)

## 2021-04-02 PROCEDURE — 85025 COMPLETE CBC W/AUTO DIFF WBC: CPT | Performed by: INTERNAL MEDICINE

## 2021-04-02 PROCEDURE — 93306 TTE W/DOPPLER COMPLETE: CPT

## 2021-04-02 PROCEDURE — 97530 THERAPEUTIC ACTIVITIES: CPT

## 2021-04-02 PROCEDURE — 97116 GAIT TRAINING THERAPY: CPT

## 2021-04-02 PROCEDURE — 93010 ELECTROCARDIOGRAM REPORT: CPT | Performed by: INTERNAL MEDICINE

## 2021-04-02 PROCEDURE — 97163 PT EVAL HIGH COMPLEX 45 MIN: CPT

## 2021-04-02 PROCEDURE — 84484 ASSAY OF TROPONIN QUANT: CPT | Performed by: INTERNAL MEDICINE

## 2021-04-02 PROCEDURE — 81003 URINALYSIS AUTO W/O SCOPE: CPT | Performed by: INTERNAL MEDICINE

## 2021-04-02 PROCEDURE — 94762 N-INVAS EAR/PLS OXIMTRY CONT: CPT

## 2021-04-02 PROCEDURE — 82948 REAGENT STRIP/BLOOD GLUCOSE: CPT

## 2021-04-02 PROCEDURE — 73200 CT UPPER EXTREMITY W/O DYE: CPT

## 2021-04-02 PROCEDURE — 93880 EXTRACRANIAL BILAT STUDY: CPT

## 2021-04-02 PROCEDURE — 93306 TTE W/DOPPLER COMPLETE: CPT | Performed by: INTERNAL MEDICINE

## 2021-04-02 PROCEDURE — 93880 EXTRACRANIAL BILAT STUDY: CPT | Performed by: SURGERY

## 2021-04-02 PROCEDURE — 80053 COMPREHEN METABOLIC PANEL: CPT | Performed by: INTERNAL MEDICINE

## 2021-04-02 RX ORDER — POTASSIUM CHLORIDE 20 MEQ/1
40 TABLET, EXTENDED RELEASE ORAL ONCE
Status: COMPLETED | OUTPATIENT
Start: 2021-04-02 | End: 2021-04-02

## 2021-04-02 RX ADMIN — PANTOPRAZOLE SODIUM 40 MG: 40 TABLET, DELAYED RELEASE ORAL at 06:04

## 2021-04-02 RX ADMIN — ENOXAPARIN SODIUM 40 MG: 40 INJECTION SUBCUTANEOUS at 09:48

## 2021-04-02 RX ADMIN — LEVOTHYROXINE SODIUM 88 MCG: 88 TABLET ORAL at 06:04

## 2021-04-02 RX ADMIN — LIDOCAINE 5% 1 PATCH: 700 PATCH TOPICAL at 09:47

## 2021-04-02 RX ADMIN — MAGNESIUM SULFATE HEPTAHYDRATE 2 G: 40 INJECTION, SOLUTION INTRAVENOUS at 01:30

## 2021-04-02 RX ADMIN — POTASSIUM CHLORIDE 40 MEQ: 1500 TABLET, EXTENDED RELEASE ORAL at 09:47

## 2021-04-02 RX ADMIN — AMLODIPINE BESYLATE 10 MG: 10 TABLET ORAL at 09:47

## 2021-04-02 RX ADMIN — ACETAMINOPHEN 650 MG: 325 TABLET, FILM COATED ORAL at 09:48

## 2021-04-02 NOTE — ASSESSMENT & PLAN NOTE
Patient has a history of thyroid nodules, last thyroid ultrasound in November 2020 showed dominant solid nodule in the mid/lower right thyroid lobe measuring up to 2 5 cm, previously biopsied for benign disease  Continue levothyroxine home dose    Obtain TSH

## 2021-04-02 NOTE — ASSESSMENT & PLAN NOTE
Patient is on multiple medications at home to control blood pressure including amlodipine 10 mg, losartan 100 mg and Lasix 40 mg daily  Patient states she feels lightheaded when she changes her position  We will continue gentle IV hydration  Orthostatic vital signs ordered  BP 150s over 70s  Continue amlodipine 10 mg daily, hold losartan 100 and Lasix 4 mg daily

## 2021-04-02 NOTE — ASSESSMENT & PLAN NOTE
History of transaminitis after administration of methotrexate, AST on admission was mildly elevated  No changes in mental status, right upper quadrant abdominal pain  No jaundice or hepatomegaly on physical examination    Follow-up with primary care provider as an outpatient

## 2021-04-02 NOTE — PROGRESS NOTES
INTERNAL MEDICINE RESIDENCY PROGRESS NOTE     Name: Burgess Vogel   Age & Sex: 79 y o  female   MRN: [id number]  Unit/Bed#: -01   Encounter: [id number]  Team: SLIM    PATIENT INFORMATION     Name: Burgess Vogel   Age & Sex: 79 y o  female   MRN: [id number]  Hospital Stay Days: 0    ASSESSMENT/PLAN     Principal Problem:    Postural dizziness with near syncope  Active Problems:    Fall    Arthritis    Diabetes mellitus ([facility] )    GERD (gastroesophageal reflux disease)    Disease of thyroid gland    Hypertension    Hypokalemia    Breast cancer ([facility] )    Transaminitis    Obstructive sleep apnea    Right shoulder pain      Fall  Assessment & Plan  Patient presenting today ED after a fall at home, patient states she felt lightheaded and fell  No chest pain, no palpitations, no headache  CT of the head no intracranial abnormality  Patient complaining of right shoulder pain, x-ray of the shoulder showing no acute fractures or dislocation, final report pending  Patient has had 2 falls in less than a month  Previous fall was mechanical and she landed on the left shoulder  Patient is for outpatient MRI as outpatient  Before the the fall yesterday involving the right shoulder  Current fall following acute/rapid change of position  CT right shoulder showed No acute osseous abnormality  Moderate acromioclavicular osteoarthritis  0 4 cm right upper lobe pulmonary nodule  Orthopedic consulted-recommended putting him on sling, outpatient MRI and orthopedic follow-up  PT/OT evaluation      * Postural dizziness with near syncope  Assessment & Plan  Patient presenting to ED after a fall at home, previous to that she felt lightheaded, no chest pain, no palpitations, no shortness of breath    CT of the head unremarkable, chest x-ray and x-ray of the shoulder reports are pending  Patient states she feels dizzy when she changes her position from lying/ sitting to standing  Orthostatic vital signs   EKG ordered, initial troponin was normal, trend troponins x2  Continue to monitor on telemetry  Patient received 1 L of NS in the ED, we will continue gentle hydration with Plasma-Lyte at 75 cc/hour, monitor for fluid overload  Blood work showing mild hypokalemia and hyperglycemia  Check echocardiogram   Carotid duplex  Repeat orthostatic vitals this morning  Right shoulder pain  Assessment & Plan  Patient complaining of right shoulder pain  Reduced range of motion with   difficult to abduct  May have rotator cuff injury /tear  Right shoulder x-ray showing no acute fractures or dislocation, final report pending  Patient has had 2 falls in less than a month  Previous fall was mechanical and she landed on the left shoulder  Patient is for outpatient MRI as outpatient  Before the the fall yesterday involving the right shoulder  Current fall following acute/rapid change of position  CT right shoulder showed No acute osseous abnormality  Moderate acromioclavicular osteoarthritis  0 4 cm right upper lobe pulmonary nodule  Orthopedic consulted-recommended putting him on sling, outpatient MRI and orthopedic follow-up  PT/OT evaluation  Lidocaine patch applied  Breast cancer [facility])  Assessment & Plan  History of a stage IA invasive ductal carcinoma a status post radiation and aromatase inhibitor treatment  No evidence of disease on recent mammogram       Hypokalemia  Assessment & Plan  Potassium on admission was3 1---> 3 4, repleted this morning  Repeat BMP in the morning    Disease of thyroid gland  Assessment & Plan  Patient has a history of thyroid nodules, last thyroid ultrasound in November 2020 showed dominant solid nodule in the mid/lower right thyroid lobe measuring up to 2 5 cm, previously biopsied for benign disease  Continue levothyroxine home dose    Obtain TSH    Diabetes mellitus [facility])  Assessment & Plan  Lab Results   Component Value Date    HGBA1C 6 7 (H) 04/01/2021       Recent Labs 04/01/21  2156 04/02/21  0722 04/02/21  1116 04/02/21  1558   POCGLU 147* 123 124 135       Blood Sugar Average: Last 72 hrs:  (P) 132  25Patient takes metformin 1000 mg twice a day, we will hold during admission  Glucose on admission was 210, will start sliding scale insulin and hypoglycemia protocol  Hemoglobin A1c ordered, follow results  Arthritis  Assessment & Plan  Patient has a history of seronegative rheumatoid arthritis diagnosed in 2015 after she presented with arthritic complaints affecting her bilateral hands and wrist with soft tissue swelling  Patient was on methotrexate and prednisone which helped initially with her symptoms, however there were discontinue in 2017 due to transaminitis and afterwards she was lost to rheumatology follow-up  She also received IV Orencia which was ineffective  seronegative rheumatoid arthritis that was diagnosed in 2015 after she presented with arthritic complaints affecting her bilateral hands and wrists, with soft tissue swelling noted of these joints based on her prior rheumatology note  Otis Wetzel was initially started on a combination of methotrexate and prednisone which she recalls as helping with her symptoms, but they were discontinued in 2017 due to transaminitis and afterwards she was lost to Rheumatology follow-up   Other than these medications she was also trialed on IV Orencia which was ineffective  She is following again with rheumatologist as an outpatient, x-rays has been unremarkable, no clear synovitis on physical examination  Normal anti CCP antibody and C-reactive protein  ESR and rheumatoid factor are pending, along with ultrasound of hands and wrist   She is currently taking only Tylenol and NSAIDs as needed for pain  Disposition:  Continued treatment  SUBJECTIVE     Patient seen and examined  No acute events overnight  Still significant right shoulder pain with limited range of motion    Denied numbness, tingling, swelling, dizziness or lightheadedness  Patient stated fall happened when she tried stand up after she tried  a paper  OBJECTIVE     Vitals:    21 0940 21 0942 21 0944 21 1600   BP: 157/67 162/98 (!) 172/87 123/59   BP Location: Right arm Right arm Right arm Right arm   Pulse: 87   77   Resp:    16   Temp:    97 8 °F (36 6 °C)   TempSrc:    Tympanic   SpO2:    98%   Weight:       Height:          Temperature:   Temp (24hrs), Av °F (36 7 °C), Min:97 5 °F (36 4 °C), Max:98 2 °F (36 8 °C)    Temperature: 97 8 °F (36 6 °C)  Intake & Output:  I/O        07 -  0700  07 -  07 07 -  0700    P  O   480     Total Intake(mL/kg)  480 (4 8)     Net  +480            Unmeasured Urine Occurrence  3 x     Unmeasured Stool Occurrence  0 x         Weights:   IBW: 50 1 kg    Body mass index is 40 24 kg/m²  Weight (last 2 days)     Date/Time   Weight    21 1659   99 8 (220)            Physical Exam  Vitals signs and nursing note reviewed  Constitutional:       General: She is not in acute distress  Appearance: She is well-developed  She is not ill-appearing or toxic-appearing  HENT:      Head: Normocephalic and atraumatic  Mouth/Throat:      Mouth: Mucous membranes are moist    Eyes:      Conjunctiva/sclera: Conjunctivae normal    Neck:      Musculoskeletal: Neck supple  Cardiovascular:      Rate and Rhythm: Normal rate and regular rhythm  Heart sounds: No murmur  Pulmonary:      Effort: Pulmonary effort is normal  No respiratory distress  Breath sounds: Normal breath sounds  Abdominal:      General: Abdomen is flat  Bowel sounds are normal  There is no distension  Palpations: Abdomen is soft  There is no mass  Tenderness: There is no abdominal tenderness  There is no guarding  Hernia: No hernia is present  Musculoskeletal:         General: Tenderness (Right shoulder) present  No swelling or deformity        Right lower leg: No edema  Left lower leg: No edema  Comments: Severely limited range of motion in the right and left shoulder  Worse in the right shoulder  Tenderness on joint palpation  No sensory deficit  Skin:     General: Skin is warm and dry  Capillary Refill: Capillary refill takes less than 2 seconds  Neurological:      General: No focal deficit present  Mental Status: She is alert and oriented to person, place, and time  Cranial Nerves: No cranial nerve deficit  Sensory: No sensory deficit  Motor: No weakness  Coordination: Coordination normal       Gait: Gait normal    Psychiatric:         Mood and Affect: Mood normal          Behavior: Behavior normal        LABORATORY DATA     Labs: I have personally reviewed pertinent reports  Results from last 7 days   Lab Units 04/02/21  0603 04/01/21  1738   WBC Thousand/uL 8 24 9 25   HEMOGLOBIN g/dL 13 2 14 2   HEMATOCRIT % 39 9 42 9   PLATELETS Thousands/uL 255 257   NEUTROS PCT % 44 67   MONOS PCT % 7 5      Results from last 7 days   Lab Units 04/02/21  0603 04/01/21  1738   POTASSIUM mmol/L 3 4* 3 1*   CHLORIDE mmol/L 106 101   CO2 mmol/L 27 27   BUN mg/dL 12 15   CREATININE mg/dL 0 73 0 84   CALCIUM mg/dL 9 1 9 6   ALK PHOS U/L 93 1 101 2   ALT U/L 42 52   AST U/L 37 52*     Results from last 7 days   Lab Units 04/01/21  2214   MAGNESIUM mg/dL 1 6                  Results from last 7 days   Lab Units 04/02/21  0603 04/01/21  2214 04/01/21  1738   TROPONIN I ng/mL <0 03 <0 03 <0 03       IMAGING & DIAGNOSTIC TESTING     Radiology Results: I have personally reviewed pertinent reports  Xr Chest 1 View Portable    Result Date: 4/2/2021  Impression: No active pulmonary disease  Workstation performed: [id number]     Xr Shoulder 2+ Views Right    Result Date: 4/2/2021  Impression: Degenerative arthritis right AC joint No acute osseous abnormality   Workstation performed: [id number]     Ct Head Without Contrast    Result Date: 4/1/2021  Impression: No acute intracranial abnormality  Workstation performed: [id number]     Ct Upper Extremity Wo Contrast Right    Result Date: 4/2/2021  Impression: No acute osseous abnormality  Moderate acromioclavicular osteoarthritis  0 4 cm right upper lobe pulmonary nodule  This does not definitively visualized on prior CT of the neck of 7/2/2014  Based on current Fleischner Society 2017 Guidelines on incidental pulmonary nodule, no routine follow-up is needed if the patient is considered low risk for lung cancer  If the patient is considered high risk for lung cancer, non-contrast chest CT is recommended on a nonemergent basis for complete evaluation  The study was marked in [facility] for immediate notification  Workstation performed: [id number]     Other Diagnostic Testing: I have personally reviewed pertinent reports  ACTIVE MEDICATIONS     Current Facility-Administered Medications   Medication Dose Route Frequency    acetaminophen (TYLENOL) tablet 650 mg  650 mg Oral Q6H PRN    amLODIPine (NORVASC) tablet 10 mg  10 mg Oral Daily    bisacodyl (DULCOLAX) rectal suppository 10 mg  10 mg Rectal Daily PRN    enoxaparin (LOVENOX) subcutaneous injection 40 mg  40 mg Subcutaneous Daily    insulin lispro (HumaLOG) 100 units/mL subcutaneous injection 1-6 Units  1-6 Units Subcutaneous TID AC    insulin lispro (HumaLOG) 100 units/mL subcutaneous injection 1-6 Units  1-6 Units Subcutaneous HS    levothyroxine tablet 88 mcg  88 mcg Oral Early Morning    lidocaine (LIDODERM) 5 % patch 1 patch  1 patch Topical Daily    pantoprazole (PROTONIX) EC tablet 40 mg  40 mg Oral Early Morning       VTE Pharmacologic Prophylaxis: Enoxaparin (Lovenox)  VTE Mechanical Prophylaxis: sequential compression device    Portions of the record may have been created with voice recognition software    Occasional wrong word or "sound a like" substitutions may have occurred due to the inherent limitations of voice recognition software    Read the chart carefully and recognize, using context, where substitutions have occurred   ==  Marilyn Killian MD  [address]  Internal Medicine Residency PGY-2

## 2021-04-02 NOTE — ASSESSMENT & PLAN NOTE
Lab Results   Component Value Date    HGBA1C 6 7 (H) 04/01/2021       Recent Labs     04/01/21  2156 04/02/21  0722 04/02/21  1116 04/02/21  1558   POCGLU 147* 123 124 135       Blood Sugar Average: Last 72 hrs:  (P) 132  25Patient takes metformin 1000 mg twice a day which was held during admission  Glucose on admission was 210, was on sliding scale insulin and hypoglycemia protocol  Hemoglobin A1--6 7   Can resume home medications at the time of discharge

## 2021-04-02 NOTE — ASSESSMENT & PLAN NOTE
Lab Results   Component Value Date    HGBA1C 6 1 06/08/2020       No results for input(s): POCGLU in the last 72 hours  Blood Sugar Average: Last 72 hrs:  Patient takes metformin 1000 mg twice a day, we will hold during admission  Glucose on admission was 210, will start sliding scale insulin and hypoglycemia protocol  Hemoglobin A1c ordered, follow results

## 2021-04-02 NOTE — UTILIZATION REVIEW
Initial Clinical Review    Admission: Date/Time/Statement:   Admission Orders (From admission, onward)     Ordered        04/01/21 1837  Place in Observation  Once                   Orders Placed This Encounter   Procedures    Place in Observation     Standing Status:   Standing     Number of Occurrences:   1     Order Specific Question:   Level of Care     Answer:   Med Surg [16]     ED Arrival Information     Expected Arrival Acuity Means of Arrival Escorted By Service Admission Type    - 4/1/2021 17:00 Urgent Ambulance [facility] Urgent    Arrival Complaint    -Dizziness > fall        Chief Complaint   Patient presents with   Ardeth Needs Fall    Shoulder Pain     Assessment/Plan: 79year old female, presented to the ED @ [address] from home via EMS  Admitted as Observation due to Postural dizziness with near syncope  PTA  Has had 2 falls in less than 1 month  She is states she has dizziness when she changes her position  CT of the head show mild me groin angiopathy changes but no acute intracranial abnormalities  X-ray of the shoulder and chest are without acute pathology  ECG 82, NSR  Initial trop WNL, trend trops  Monitor on telelmetry  Gentle IV flds  PT/OT    Labs in AM     VTE Prophylaxis: Enoxaparin (Lovenox)  / sequential compression device     ED Triage Vitals   Temperature Pulse Respirations Blood Pressure SpO2   04/01/21 1701 04/01/21 1659 04/01/21 1659 04/01/21 1659 04/01/21 1659   98 2 °F (36 8 °C) 92 18 149/65 99 %      Temp Source Heart Rate Source Patient Position - Orthostatic VS BP Location FiO2 (%)   04/01/21 1947 04/01/21 1947 04/01/21 1947 04/01/21 1947 --   Tympanic Monitor Lying Right arm       Pain Score       04/01/21 1947       No Pain          Wt Readings from Last 1 Encounters:   04/01/21 99 8 kg (220 lb)     Additional Vital Signs:   Date/Time  Temp  Pulse  Resp  BP  MAP (mmHg)  SpO2  O2 Device  Patient Position - Orthostatic VS   04/02/21 0944  --  --  -- 172/87Abnormal   --  --  --  Lying   04/02/21 0942  --  --  --  162/98  --  --  --  Sitting - Orthostatic VS   04/02/21 0940  --  87  --  157/67  --  --  --  Lying - Orthostatic VS   04/02/21 0732  97 5 °F (36 4 °C)  74  16  139/78  --  95 %  --  Lying   04/02/21 0724  --  --  --  --  --  --  None (Room air)  --   04/02/21 0616  --  85  20  150/76  --  --  --  Standing   04/02/21 0611  --  83  18  140/86  --  --  --  Sitting   04/02/21 0609  98 2 °F (36 8 °C)  78  18  145/72  --  96 %  None (Room air)  Lying   04/01/21 2345  98 2 °F (36 8 °C)  77  18  122/68  86  96 %  None (Room air)  Lying   04/01/21 1947  98 2 °F (36 8 °C)  86  18  157/77  --  99 %  None (Room air)  Lying       04/01/2021 @ 1831  CT head:  No acute intracranial abnormality  04/02/2021 @ 0902  Right shoulder X:  Degenerative arthritis right AC joint   No acute osseous abnormality  04/02/2021 @ 1045  Left shoulder X:  Glenohumeral joint space widening and slight humeral head inferior subluxation which may reflect joint effusion  No acute osseous abnormality  04/02/2021 @ 0900  Chest X:  No active pulmonary disease       Pertinent Labs/Diagnostic Test Results:       Results from last 7 days   Lab Units 04/02/21  0603 04/01/21  1738   WBC Thousand/uL 8 24 9 25   HEMOGLOBIN g/dL 13 2 14 2   HEMATOCRIT % 39 9 42 9   PLATELETS Thousands/uL 255 257   NEUTROS ABS Thousands/µL 3 60 6 17         Results from last 7 days   Lab Units 04/02/21  0603 04/01/21  2214 04/01/21  1738   SODIUM mmol/L 143  --  140   POTASSIUM mmol/L 3 4*  --  3 1*   CHLORIDE mmol/L 106  --  101   CO2 mmol/L 27  --  27   ANION GAP mmol/L 10  --  12   BUN mg/dL 12  --  15   CREATININE mg/dL 0 73  --  0 84   EGFR ml/min/1 73sq m 77  --  65   CALCIUM mg/dL 9 1  --  9 6   MAGNESIUM mg/dL  --  1 6  --      Results from last 7 days   Lab Units 04/02/21  0603 04/01/21  1738   AST U/L 37 52*   ALT U/L 42 52   ALK PHOS U/L 93 1 101 2   TOTAL PROTEIN g/dL 6 7 7 4   ALBUMIN g/dL 3 7 4  2   TOTAL BILIRUBIN mg/dL 0 61 0 41     Results from last 7 days   Lab Units 04/02/21  0722 04/01/21  2156   POC GLUCOSE mg/dl 123 147*     Results from last 7 days   Lab Units 04/02/21  0603 04/01/21  1738   GLUCOSE RANDOM mg/dL 124 210*           Results from last 7 days   Lab Units 04/02/21  0603 04/01/21  2214 04/01/21  1738   TROPONIN I ng/mL <0 03 <0 03 <0 03             Results from last 7 days   Lab Units 04/01/21  2214   TSH 3RD GENERATON uIU/mL 1 052               Results from last 7 days   Lab Units 04/02/21  0558   CLARITY UA  Clear   COLOR UA  Yellow   SPEC GRAV UA  1 020   PH UA  6 0   GLUCOSE UA mg/dl Negative   KETONES UA mg/dl Negative   BLOOD UA  Negative   PROTEIN UA mg/dl Negative   NITRITE UA  Negative   BILIRUBIN UA  Negative   UROBILINOGEN UA E U /dl 0 2   LEUKOCYTES UA  Negative                 ED Treatment:   Medication Administration from 04/01/2021 1655 to 04/01/2021 1933       Date/Time Order Dose Route Action     04/01/2021 1739 sodium chloride 0 9 % bolus 1,000 mL 1,000 mL Intravenous New Bag     04/01/2021 1829 acetaminophen (TYLENOL) tablet 975 mg 975 mg Oral Given        Past Medical History:   Diagnosis Date    Arthritis     Diabetes mellitus ([facility] )     Disease of thyroid gland     GERD (gastroesophageal reflux disease)     Headache     Hemorrhoids     Hx of radiation therapy 03/09/2012    PBRT    Hypertension     Hypertension     Night sweats     Rheumatoid arthritis (HCC)     Trigger finger     right and left hand    Urinary incontinence     Use of anastrozole (Arimidex)     Use of letrozole (Femara)      Present on Admission:   Arthritis   Diabetes mellitus ([facility] )   GERD (gastroesophageal reflux disease)   Disease of thyroid gland   Hypertension   Postural dizziness with near syncope   Fall   [facility]   Obstructive sleep apnea      Admitting Diagnosis: Right shoulder pain [M25 511]  Near syncope [R55]  Fall, initial encounter [W19  XXXA]  Acute pain of right shoulder [M25 511]  Age/Sex: 79 y o  female  Admission Orders:  Scheduled Medications:  amLODIPine, 10 mg, Oral, Daily  enoxaparin, 40 mg, Subcutaneous, Daily  insulin lispro, 1-6 Units, Subcutaneous, TID AC  insulin lispro, 1-6 Units, Subcutaneous, HS  levothyroxine, 88 mcg, Oral, Early Morning  lidocaine, 1 patch, Topical, Daily  pantoprazole, 40 mg, Oral, Early Morning      Continuous IV Infusions:  multi-electrolyte (PLASMALYTE-A/ISOLYTE-S PH 7 4) IV solution   Rate: 75 mL/hr Dose: 75 mL/hr  Freq: Continuous Route: IV  Indications of Use: IV Hydration  Last Dose: 75 mL/hr (04/01/21 2132)  Start: 04/01/21 2100 End: 04/02/21 0731    PRN Meds:  acetaminophen, 650 mg, Oral, Q6H PRN  bisacodyl, 10 mg, Rectal, Daily PRN        IP CONSULT TO CASE MANAGEMENT    Network Utilization Review Department  ATTENTION: Please call with any questions or concerns to [phone] and carefully listen to the prompts so that you are directed to the right person  All voicemails are confidential   Harlene Pair all requests for admission clinical reviews, approved or denied determinations and any other requests to dedicated fax number below belonging to the campus where the patient is receiving treatment   List of dedicated fax numbers for the Facilities:  [address] DENIALS (Administrative/Medical Necessity) [phone]   [address] (Maternity/NICU/Pediatrics) [phone]   [address] [facility] [address]   [address] [facility] [address] [facility] Shauna Schneider 8560 (  Jed Mendez "Armida" 103) [address] [facility] [address] - [place] [address] [phone]

## 2021-04-02 NOTE — ASSESSMENT & PLAN NOTE
Patient has a history of seronegative rheumatoid arthritis diagnosed in 2015 after she presented with arthritic complaints affecting her bilateral hands and wrist with soft tissue swelling  Patient was on methotrexate and prednisone which helped initially with her symptoms, however there were discontinue in 2017 due to transaminitis and afterwards she was lost to rheumatology follow-up  She also received IV Orencia which was ineffective  seronegative rheumatoid arthritis that was diagnosed in 2015 after she presented with arthritic complaints affecting her bilateral hands and wrists, with soft tissue swelling noted of these joints based on her prior rheumatology note  Austin Leon was initially started on a combination of methotrexate and prednisone which she recalls as helping with her symptoms, but they were discontinued in 2017 due to transaminitis and afterwards she was lost to Rheumatology follow-up   Other than these medications she was also trialed on IV Orencia which was ineffective  She is following again with rheumatologist as an outpatient, x-rays has been unremarkable, no clear synovitis on physical examination  Normal anti CCP antibody and C-reactive protein  ESR and rheumatoid factor are pending, along with ultrasound of hands and wrist   She is currently taking only Tylenol and NSAIDs as needed for pain

## 2021-04-02 NOTE — PLAN OF CARE
Problem: PHYSICAL THERAPY ADULT  Goal: Performs mobility at highest level of function for planned discharge setting  See evaluation for individualized goals  Description: Treatment/Interventions: Bed mobility, Patient/family training, Endurance training, Therapeutic exercise, Functional transfer training, Elevations, Gait training, Equipment eval/education, Spoke to MD, Spoke to nursing, Spoke to case management, OT          See flowsheet documentation for full assessment, interventions and recommendations  Outcome: Progressing  Note: Prognosis: Good  Problem List: Decreased strength, Decreased range of motion, Decreased endurance, Pain, Decreased mobility, Impaired balance  Assessment: Pt is 78 y/o female admitted to the hospital s/p fall on R arm due to dizziness and near syncope resulting in R shoulder pain and weakness  Pt has PMH DM, OA, RA, HTN, GERD, thyroid gland disease, and breast CA with lumpectomy L breast  Pt saw orthopedic physician s/p prior fall 3/25/21 with suspected L RTC tear  PTA, pt was living in a single floor home with 2 steps to enter with her , daughter and granddaughter  Pt was independent with all ADLs and functional mobility without AD  Pt complains of R shoulder pain d/t fall  Pt reports dizziness yesterday after the fall but it has not occurred since  Pt has increased edema in R fingers, hand and  and unable to flex fingers fully or  a fist  Orthostatic BP was taken and negative  Pt presented with inability to voluntary move R arm limited by 8/10 pain and weakness  Pt had pain with PROM, however was able to tolerate 50% of range with shoulder elevation compensation limited by pain  Pt has decreased endurance, B UE weakness, pain and ROM deficits and difficulty with functional mobility and ability to perform ADLs  Pt scored 16/24 on AM-PAC with standardized score of 38 32 indicating need for rehab following d/c   Pt scored 50/100 on Barthel Index indicating increased assistance with ADLs and functional mobility  Recommend additional imaging based on exam findings suggesting potential R RTC tear  Recommend D/C home with OP PT to address B shoulder impairments  Pt with supportive family whom are available to help when needed  Barriers to Discharge: None  Barriers to Discharge Comments: (S) Pt lives with family who is able to help as needed  Recommend further imaging of B Shoulders due to PT exam findings  Recommend OPPT upon d/c    PT Discharge Recommendation: (S) Return to previous environment with no needs, Other (Comment)(OP PT)          See flowsheet documentation for full assessment

## 2021-04-02 NOTE — ASSESSMENT & PLAN NOTE
Patient has a history of seronegative rheumatoid arthritis diagnosed in 2015 after she presented with arthritic complaints affecting her bilateral hands and wrist with soft tissue swelling  Patient was on methotrexate and prednisone which helped initially with her symptoms, however there were discontinue in 2017 due to transaminitis and afterwards she was lost to rheumatology follow-up  She also received IV Orencia which was ineffective  seronegative rheumatoid arthritis that was diagnosed in 2015 after she presented with arthritic complaints affecting her bilateral hands and wrists, with soft tissue swelling noted of these joints based on her prior rheumatology note  Salina Crenshaw was initially started on a combination of methotrexate and prednisone which she recalls as helping with her symptoms, but they were discontinued in 2017 due to transaminitis and afterwards she was lost to Rheumatology follow-up   Other than these medications she was also trialed on IV Orencia which was ineffective  She is following again with rheumatologist as an outpatient, x-rays has been unremarkable, no clear synovitis on physical examination  Normal anti CCP antibody and C-reactive protein  ESR and rheumatoid factor are pending, along with ultrasound of hands and wrist   She is currently taking only Tylenol and NSAIDs as needed for pain

## 2021-04-02 NOTE — ASSESSMENT & PLAN NOTE
Patient presenting to ED after a fall at home, previous to that she felt lightheaded, no chest pain, no palpitations, no shortness of breath  CT of the head unremarkable, chest x-ray and x-ray of the shoulder reports are pending  Patient states she feels dizzy when she changes her position from lying/ sitting to standing  Orthostatic vital signs   EKG ordered, initial troponin was normal, trend troponins x2  Continue to monitor on telemetry  Patient received 1 L of NS in the ED, we will continue gentle hydration with Plasma-Lyte at 75 cc/hour, monitor for fluid overload  Blood work showing mild hypokalemia and hyperglycemia  Will order TSH, patient has history of hypothyroidism

## 2021-04-02 NOTE — ASSESSMENT & PLAN NOTE
Patient presented to ED after a fall at home, previous to that she felt lightheaded, no chest pain, no palpitations, no shortness of breath  CT of the head unremarkable, x-ray and CT of the show right shoulder showed no acute osseous abnormality  Showed moderate acromioclavicular arthritis  Patient stated she feels dizzy when she changes her position from lying/ sitting to standing -0 however orthostatic vital signs were negative  Troponin was normal  Continue to monitor on telemetry  Patient received 1 L of NS in the ED, patient was continued on gentle IV hydration during hospitalization  Blood work showed mild hypokalemia and hyperglycemia which normalized  ECHO showed normal EF of 60%, G1DD, carotid duplex showed < 50% stenosis bilaterally

## 2021-04-02 NOTE — ASSESSMENT & PLAN NOTE
Patient presented to the ED after a fall at home, patient stated that she felt lightheaded and fell  No chest pain, no palpitations, no headache  CT of the head no intracranial abnormality  Patient complaining of right shoulder pain, x-ray of the shoulder showing no acute fractures or dislocation  Patient has had 2 falls in less than a month  Previous fall was mechanical and she landed on the left shoulder  Patient is for outpatient MRI as outpatient  Before the the fall yesterday involving the right shoulder  Current fall following acute/rapid change of position  CT right shoulder showed No acute osseous abnormality  Moderate acromioclavicular osteoarthritis  0 4 cm right upper lobe pulmonary nodule  Orthopedic consulted-recommended putting him on sling, outpatient MRI and orthopedic follow-up    PT/OT evaluation    Ordered right shoulder MRI at the time of discharge and referral to orthopedic

## 2021-04-02 NOTE — ASSESSMENT & PLAN NOTE
Patient presenting today ED after a fall at home, patient states she felt lightheaded and fell  No chest pain, no palpitations, no headache  CT of the head unremarkable  Patient complaining of right shoulder pain, x-ray of the shoulder showing no acute fractures or dislocation, final report pending    Patient has had 2 falls in less than a month  PT/OT evaluation

## 2021-04-02 NOTE — PLAN OF CARE
Problem: PAIN - ADULT  Goal: Verbalizes/displays adequate comfort level or baseline comfort level  Description: Interventions:  - Encourage patient to monitor pain and request assistance  - Assess pain using appropriate pain scale  - Administer analgesics based on type and severity of pain and evaluate response  - Implement non-pharmacological measures as appropriate and evaluate response  - Consider cultural and social influences on pain and pain management  - Notify physician/advanced practitioner if interventions unsuccessful or patient reports new pain  Outcome: Progressing     Problem: INFECTION - ADULT  Goal: Absence or prevention of progression during hospitalization  Description: INTERVENTIONS:  - Assess and monitor for signs and symptoms of infection  - Monitor lab/diagnostic results  - Administer medications as ordered  - Instruct and encourage patient and family to use good hand hygiene technique  - Identify and instruct in appropriate isolation precautions for identified infection/condition  Outcome: Progressing     Problem: SAFETY ADULT  Goal: Patient will remain free of falls  Description: INTERVENTIONS:  - Assess patient frequently for physical needs  -  Identify cognitive and physical deficits and behaviors that affect risk of falls    -  [place] fall precautions as indicated by assessment   - Educate patient/family on patient safety including physical limitations  - Instruct patient to call for assistance with activity based on assessment  - Modify environment to reduce risk of injury  - Consider OT/PT consult to assist with strengthening/mobility  Outcome: Progressing  Goal: Maintain or return to baseline ADL function  Description: INTERVENTIONS:  -  Assess patient's ability to carry out ADLs; assess patient's baseline for ADL function and identify physical deficits which impact ability to perform ADLs (bathing, care of mouth/teeth, toileting, grooming, dressing, etc )  - Assess/evaluate cause of self-care deficits   - Assess range of motion  - Assess patient's mobility; develop plan if impaired  - Assess patient's need for assistive devices and provide as appropriate  - Encourage maximum independence but intervene and supervise when necessary  - Involve family in performance of ADLs  - Assess for home care needs following discharge   - Consider OT consult to assist with ADL evaluation and planning for discharge  - Provide patient education as appropriate  Outcome: Progressing  Goal: Maintain or return mobility status to optimal level  Description: INTERVENTIONS:  - Assess patient's baseline mobility status (ambulation, transfers, stairs, etc )    - Identify cognitive and physical deficits and behaviors that affect mobility  - Identify mobility aids required to assist with transfers and/or ambulation (gait belt, sit-to-stand, lift, walker, cane, etc )  - [place] fall precautions as indicated by assessment  - Record patient progress and toleration of activity level on Mobility SBAR; progress patient to next Phase/Stage  - Instruct patient to call for assistance with activity based on assessment  - Consider rehabilitation consult to assist with strengthening/weightbearing, etc   Outcome: Progressing     Problem: Potential for Falls  Goal: Patient will remain free of falls  Description: INTERVENTIONS:  - Assess patient frequently for physical needs  -  Identify cognitive and physical deficits and behaviors that affect risk of falls    -  [place] fall precautions as indicated by assessment   - Educate patient/family on patient safety including physical limitations  - Instruct patient to call for assistance with activity based on assessment  - Modify environment to reduce risk of injury  - Consider OT/PT consult to assist with strengthening/mobility  Outcome: Progressing

## 2021-04-02 NOTE — MALNUTRITION/BMI
This medical record reflects one or more clinical indicators suggestive of malnutrition and/or morbid obesity  Malnutrition Findings:              BMI Findings:  Adult BMI Classifications: Morbid Obesity 40-44 9     Body mass index is 40 24 kg/m²  See Nutrition note dated 4/2/2021 for additional details  Completed nutrition assessment is viewable in the nutrition documentation

## 2021-04-02 NOTE — H&P
[address] [facility] 1940, 79 y o  female MRN: [id number]  Unit/Bed#: -01 Encounter: [id number]  Primary Care Provider: Abdi Tuttle MD   Date and time admitted to hospital: 4/1/2021  5:00 PM    * Postural dizziness with near syncope  Assessment & Plan  Patient presenting to ED after a fall at home, previous to that she felt lightheaded, no chest pain, no palpitations, no shortness of breath  CT of the head unremarkable, chest x-ray and x-ray of the shoulder reports are pending  Patient states she feels dizzy when she changes her position from lying/ sitting to standing  Orthostatic vital signs   EKG ordered, initial troponin was normal, trend troponins x2  Continue to monitor on telemetry  Patient received 1 L of NS in the ED, we will continue gentle hydration with Plasma-Lyte at 75 cc/hour, monitor for fluid overload  Blood work showing mild hypokalemia and hyperglycemia  Will order TSH, patient has history of hypothyroidism  Fall  Assessment & Plan  Patient presenting today ED after a fall at home, patient states she felt lightheaded and fell  No chest pain, no palpitations, no headache  CT of the head unremarkable  Patient complaining of right shoulder pain, x-ray of the shoulder showing no acute fractures or dislocation, final report pending  Patient has had 2 falls in less than a month  PT/OT evaluation      Obstructive sleep apnea  Assessment & Plan  Patient has a history of obstructive sleep apnea, not on CPAP therapy  Patient states her daughter complains about her snoring, she feels tired and sleepy during the day, when she wakes up she does not feel rested  Recommended follow-up with pulmonology, patient has symptomatic SMOOTH  Overnight pulse oximetry ordered  Transaminitis  Assessment & Plan  History of transaminitis after administration of methotrexate, AST on admission was mildly elevated    No changes in mental status, right upper quadrant abdominal pain  No jaundice or hepatomegaly on physical examination  Follow-up with primary care provider as an outpatient    Breast cancer [facility])  Assessment & Plan  History of a stage IA invasive ductal carcinoma a status post radiation and aromatase inhibitor treatment  No evidence of disease on recent mammogram       Hypokalemia  Assessment & Plan  Potassium on admission was 3 1, repleted  Repeat BMP in the morning    Hypertension  Assessment & Plan  Patient is on multiple medications at home to control blood pressure including amlodipine 10 mg, losartan 100 mg and Lasix 40 mg daily  Patient states she feels lightheaded when she changes her position  We will continue gentle IV hydration  Orthostatic vital signs ordered  BP 150s over 70s  Continue amlodipine 10 mg daily, hold losartan 100 and Lasix 4 mg daily  Disease of thyroid gland  Assessment & Plan  Patient has a history of thyroid nodules, last thyroid ultrasound in November 2020 showed dominant solid nodule in the mid/lower right thyroid lobe measuring up to 2 5 cm, previously biopsied for benign disease  Continue levothyroxine home dose  Obtain TSH    GERD (gastroesophageal reflux disease)  Assessment & Plan  History of GERD, continue Protonix 40 mg daily  Diabetes mellitus [facility])  Assessment & Plan  Lab Results   Component Value Date    HGBA1C 6 1 06/08/2020       No results for input(s): POCGLU in the last 72 hours  Blood Sugar Average: Last 72 hrs:  Patient takes metformin 1000 mg twice a day, we will hold during admission  Glucose on admission was 210, will start sliding scale insulin and hypoglycemia protocol  Hemoglobin A1c ordered, follow results  Arthritis  Assessment & Plan  Patient has a history of 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 after she presented with arthritic complaints affecting her bilateral hands and wrist with soft tissue swelling    Patient was on methotrexate and prednisone which helped initially with her symptoms, however there were discontinue in 2017 due to transaminitis and afterwards she was lost to rheumatology follow-up  She also received IV Orencia which was ineffective  seronegative rheumatoid arthritis that was diagnosed in 2015 after she presented with arthritic complaints affecting her bilateral hands and wrists, with soft tissue swelling noted of these joints based on her prior rheumatology note  Xavi Ching was initially started on a combination of methotrexate and prednisone which she recalls as helping with her symptoms, but they were discontinued in 2017 due to transaminitis and afterwards she was lost to Rheumatology follow-up   Other than these medications she was also trialed on IV Orencia which was ineffective  She is following again with rheumatologist as an outpatient, x-rays has been unremarkable, no clear synovitis on physical examination  Normal anti CCP antibody and C-reactive protein  ESR and rheumatoid factor are pending, along with ultrasound of hands and wrist   She is currently taking only Tylenol and NSAIDs as needed for pain  VTE Prophylaxis: Enoxaparin (Lovenox)  / sequential compression device   Code Status:  Full code with limits ( DNI ), can attempt CPR  POLST: POLST form is on file already (pre-hospital)    Anticipated Length of Stay:  Patient will be admitted on an Observation basis with an anticipated length of stay of  less than 2 midnights  Justification for Hospital Stay:  Near syncope, fall  Total Time for Visit, including Counseling / Coordination of Care: 45 minutes  Greater than 50% of this total time spent on direct patient counseling and coordination of care      Chief Complaint:  Falls     History of Present Illness:    Remi Melo is a 79 y o  female who presents to the ED after a fall at home, previously to that she felt lightheaded but denies any chest pain, palpitations, shortness of breath, visual disturbances, weakness or numbness  Patient has had 2 falls in less than 1 month  She is states she has dizziness when she changes her position  Patient has a history of seronegative rheumatoid arthritis, currently not on any disease modifying antirheumatic drugs  She also has a history of breast cancer on remission, diabetes mellitus type 2, hypertension, GERD, hypothyroidism  In the ED she was afebrile, saturating well on room air, blood pressure was mildly elevated  Blood were significant for mild hypokalemia, mildly elevation of glucose and AST  CT of the head show mild me groin angiopathy changes but no acute intracranial abnormalities  X-ray of the shoulder and chest are without acute pathology, however official report is pending  EKG show normal sinus rhythm with a heart rate of 82  During my encounter patient was alert and oriented, she was complaining only of right shoulder pain  Patient will be admitted due to near syncope causing fall, ambulatory dysfunction, PT/OT evaluation  We will give gentle IV hydration, monitor on telemetry  Review of Systems:    Review of Systems   Constitutional: Negative for activity change, appetite change, chills, diaphoresis, fatigue and fever  HENT: Negative for trouble swallowing  Eyes: Negative for visual disturbance  Respiratory: Negative for cough, choking, shortness of breath and wheezing  Cardiovascular: Positive for leg swelling  Negative for chest pain and palpitations  Gastrointestinal: Negative for abdominal distention, abdominal pain, anal bleeding, blood in stool, constipation, diarrhea, nausea and vomiting  Neurological: Positive for dizziness and light-headedness  Negative for tremors, syncope, weakness and headaches  Psychiatric/Behavioral: Positive for sleep disturbance  Negative for agitation, behavioral problems, confusion, decreased concentration, dysphoric mood, hallucinations, self-injury and suicidal ideas   The patient is not nervous/anxious and is not hyperactive  Past Medical and Surgical History:     Past Medical History:   Diagnosis Date    Arthritis     Diabetes mellitus ([address] )     Disease of thyroid gland     GERD (gastroesophageal reflux disease)     Headache     Hemorrhoids     Hx of radiation therapy 03/09/2012    PBRT    Hypertension     Hypertension     Night sweats     Rheumatoid arthritis (HCC)     Trigger finger     right and left hand    Urinary incontinence     Use of anastrozole (Arimidex)     Use of letrozole (Femara)        Past Surgical History:   Procedure Laterality Date    ABDOMINAL SURGERY      APPENDECTOMY      BREAST BIOPSY Left 01/162012    [facility]    BREAST LUMPECTOMY Left 02/17/2012    BREAST SURGERY      CATARACT EXTRACTION      CHOLECYSTECTOMY      FOOT SURGERY      right and left foot    HYSTERECTOMY      JOINT REPLACEMENT      KNEE SURGERY      TN INCISE FINGER TENDON SHEATH Right 8/7/2018    Procedure: RELEASE TRIGGER FINGER - LONG FINGER AND RING FINGER;  Surgeon: Trudi Haskins MD;  Location: [facility];  Service: Orthopedics    TN INCISE FINGER TENDON SHEATH Left 8/14/2018    Procedure: RELEASE TRIGGER FINGER - LEFT INDEX FINGER;  Surgeon: Trudi Haskins MD;  Location: [facility];  Service: Orthopedics    REPAIR RECTOCELE      TONSILLECTOMY      TUBAL LIGATION         Meds/Allergies:    Prior to Admission medications    Medication Sig Start Date End Date Taking? Authorizing Provider   acetaminophen (TYLENOL 8 HOUR) 650 mg CR tablet Take 1 tablet (650 mg total) by mouth every 8 (eight) hours 8/7/18   Trudi Haskins MD   amLODIPine (NORVASC) 10 mg tablet Take 10 mg by mouth daily  Historical Provider, MD   calcium citrate-vitamin D (CITRACAL+D) 315-200 MG-UNIT per tablet Take 1 tablet by mouth daily  Historical Provider, MD   furosemide (LASIX) 40 mg tablet Take 40 mg by mouth daily      Historical Provider, MD   Lancets (OneTouch Delica Plus [id number]) MISC TEST BLOOD GLUCOSE DAILY 7/30/20   Historical Provider, MD   losartan (COZAAR) 100 MG tablet Take 100 mg by mouth daily  Historical Provider, MD   metFORMIN (GLUCOPHAGE) 1000 MG tablet Take 1,000 mg by mouth 2 (two) times a day with meals  Historical Provider, MD   Multiple Vitamin (MULTIVITAMIN) tablet Take 1 tablet by mouth daily  Historical Provider, MD   OneTouch Verio test strip use 1 TEST STRIP to TEST BLOOD SUGAR once daily 7/29/20   Historical Provider, MD   SYNTHROID 88 MCG tablet  9/26/18   Historical Provider, MD     I have reviewed home medications with patient personally  Allergies:    Allergies   Allergen Reactions    Sulfa Antibiotics Rash    Penicillins Rash       Social History:     Marital Status: /Civil Union   Occupation:  Retired  Patient Pre-hospital Living Situation:  Private home  Patient Pre-hospital Level of Mobility: independent   Patient Pre-hospital Diet Restrictions:  Diabetes diet  Substance Use History:   Social History     Substance and Sexual Activity   Alcohol Use Yes     Social History     Tobacco Use   Smoking Status Never Smoker   Smokeless Tobacco Never Used     Social History     Substance and Sexual Activity   Drug Use No       Family History:    Family History   Problem Relation Age of Onset    Prostate cancer Father         age at dx Pedro Pablo Saavedra Prostate cancer Brother 68    Thyroid cancer Brother         age at dx unk    Thyroid cancer Daughter 39        age at dx unk    Breast cancer Maternal Aunt 71        age at dx unk    Lung cancer Maternal Uncle         age at dx unk   Matt Lamprey No Known Problems Son     No Known Problems Son     No Known Problems Son     Pancreatic cancer Maternal Aunt         unknown age       Physical Exam:     Vitals:   Blood Pressure: 157/77 (04/01/21 1947)  Pulse: 86 (04/01/21 1947)  Temperature: 98 2 °F (36 8 °C) (04/01/21 1947)  Temp Source: Tympanic (04/01/21 1947)  Respirations: 18 (04/01/21 1947)  Height: 5' 2" (157 5 cm) (04/01/21 1659)  Weight - Scale: 99 8 kg (220 lb) (04/01/21 1659)  SpO2: 99 % (04/01/21 1947)    Physical Exam  Vitals signs reviewed  Constitutional:       Appearance: She is obese  HENT:      Head: Normocephalic  Eyes:      General: No scleral icterus  Right eye: No discharge  Left eye: No discharge  Conjunctiva/sclera: Conjunctivae normal    Cardiovascular:      Rate and Rhythm: Normal rate and regular rhythm  Pulses: Normal pulses  Heart sounds: Normal heart sounds  No murmur  Pulmonary:      Effort: Pulmonary effort is normal  No respiratory distress  Breath sounds: No stridor  No wheezing, rhonchi or rales  Abdominal:      General: Abdomen is protuberant  Bowel sounds are normal  There is no distension  Palpations: Abdomen is soft  There is no shifting dullness or hepatomegaly  Tenderness: There is no abdominal tenderness  There is no guarding or rebound  Negative signs include Gonzalez's sign  Musculoskeletal:      Right shoulder: She exhibits decreased range of motion and tenderness  Left shoulder: She exhibits decreased range of motion  Right lower leg: Edema present  Left lower leg: Edema present  Skin:     General: Skin is warm  Coloration: Skin is not jaundiced  Neurological:      General: No focal deficit present  Mental Status: She is alert and oriented to person, place, and time  Psychiatric:         Mood and Affect: Mood normal          Behavior: Behavior normal          Additional Data:     Lab Results: I have personally reviewed pertinent reports        Results from last 7 days   Lab Units 04/01/21  1738   WBC Thousand/uL 9 25   HEMOGLOBIN g/dL 14 2   HEMATOCRIT % 42 9   PLATELETS Thousands/uL 257   NEUTROS PCT % 67   LYMPHS PCT % 26   MONOS PCT % 5   EOS PCT % 1     Results from last 7 days   Lab Units 04/01/21  1738   SODIUM mmol/L 140   POTASSIUM mmol/L 3 1*   CHLORIDE mmol/L 101   CO2 mmol/L 27   BUN mg/dL 15 CREATININE mg/dL 0 84   ANION GAP mmol/L 12   CALCIUM mg/dL 9 6   ALBUMIN g/dL 4 2   TOTAL BILIRUBIN mg/dL 0 41   ALK PHOS U/L 101 2   ALT U/L 52   AST U/L 52*   GLUCOSE RANDOM mg/dL 210*                       Imaging: I have personally reviewed pertinent reports  CT head without contrast   Final Result by Caleb Mendez MD (04/01 1835)      No acute intracranial abnormality  Workstation performed: [id number]         XR chest 1 view portable    (Results Pending)   XR shoulder 2+ views RIGHT    (Results Pending)       Allscripts / Epic Records Reviewed: Yes     ** Please Note: This note has been constructed using a voice recognition system   **

## 2021-04-02 NOTE — PHYSICAL THERAPY NOTE
PHYSICAL THERAPY EVALUATION AND TREATMENT   [id number]     NAME:  Iesha Goldberg  DATE: 04/02/21    AGE:   79 y o  Mrn:   [id number]  Length Of Stay: 0    ADMIT DX:  Right shoulder pain [M25 511]  Near syncope [R55]  Fall, initial encounter [W19  XXXA]  Acute pain of right shoulder [M25 511]    Past Medical History:   Diagnosis Date    Arthritis     Diabetes mellitus ([address] )     Disease of thyroid gland     GERD (gastroesophageal reflux disease)     Headache     Hemorrhoids     Hx of radiation therapy 03/09/2012    PBRT    Hypertension     Hypertension     Night sweats     Rheumatoid arthritis (HCC)     Trigger finger     right and left hand    Urinary incontinence     Use of anastrozole (Arimidex)     Use of letrozole (Femara)      Past Surgical History:   Procedure Laterality Date    ABDOMINAL SURGERY      APPENDECTOMY      BREAST BIOPSY Left 01/162012    [facility]    BREAST LUMPECTOMY Left 02/17/2012    BREAST SURGERY      CATARACT EXTRACTION      CHOLECYSTECTOMY      FOOT SURGERY      right and left foot    HYSTERECTOMY      JOINT REPLACEMENT      KNEE SURGERY      HI INCISE FINGER TENDON SHEATH Right 8/7/2018    Procedure: RELEASE TRIGGER FINGER - LONG FINGER AND RING FINGER;  Surgeon: Edmundo Olmstead MD;  [facility];  Service: Orthopedics    HI INCISE FINGER TENDON SHEATH Left 8/14/2018    Procedure: RELEASE TRIGGER FINGER - LEFT INDEX FINGER;  Surgeon: Edmundo Olmstead MD;  [facility];  Service: Orthopedics    REPAIR RECTOCELE      TONSILLECTOMY      TUBAL LIGATION         Performed at least 2 patient identifiers during session: Name, Torresie Heading and ID bracelet       04/02/21 1008   PT Last Visit   PT Visit Date 04/02/21   Note Type   Note type Evaluation  (& treatment, see 'additional comments' for treatment )   Pain Assessment   Pain Assessment Tool 0-10   Pain Score 8  (8/10 pain with UE use and mobility)   Pain Location/Orientation Orientation: Right;Location: Shoulder   Pain Onset/Description Onset: Ongoing  (worse with UE mobility )   Effect of Pain on Daily Activities limits indep with ADLs/mobility, limits comfort and positioning   Patient's Stated Pain Goal No pain   Hospital Pain Intervention(s) Elevated;Repositioned; Ambulation/increased activity   Multiple Pain Sites Yes   Pain 2   Pain Score 2 5   Pain Location/Orientation 2 Orientation: Left; Location: Shoulder   Home Living   Type of [address] [place] [address] in basement;Able to live on main level with bedroom/bathroom;Stairs to enter with rails; Two level;Performs ADLs on one level  (4 stairs to enter)   Artis Rodriguez  (walker d/t previous foot surgeries, does not use currently)   Prior Function   Level of [place] Independent with ADLs and functional mobility   Lives With Spouse;Daughter  (grand daughter)   Jaden Help From Other (Comment)  (family present at home and able to assist as needed)   ADL Assistance Independent   IADLs Independent   Falls in the last 6 months 1 to 4  (2 falls within past 2 weeks)   Vocational Retired   Restrictions/Precautions   [place]s [place] Bearing Precautions Per Order No   Braces or Orthoses Sling  (sling ordered by MD, not appropriate at this time for wear)   Other Precautions Limb alert;Multiple lines;Pain;Telemetry; Fall Risk   General   Additional Pertinent History Pt admitted 4/1/21 due to dizziness and near syncope leading to a fall  Dx: postural dizziness with near syncope  Fall resulting in R shoulder pain, weakness, and dec AROM  Of note, pt with another recent fall 3/25/21 resulting in L shoulder pain and dec AROM      Family/Caregiver Present No   Cognition   Overall Cognitive Status WFL   Arousal/Participation Alert   Orientation Level Oriented X4   Memory Within functional limits   Following Commands Follows all commands and directions without difficulty   RUE Assessment   RUE Assessment X  (increased edema of R hand up to mid upper arm)   RUE Overall AROM   R Shoulder Flexion IMPAIRED: unable to perform d/t pain & weakness   R Shoulder ABduction IMPAIRED: unable to perform d/t pain & weakness   RUE Overall PROM   R Shoulder Flexion <50% of range with painful endfeel; pt guarding d/t pain   R Shoulder ABduction <50% of range with painful endfeel; pt guarding d/t pain   RUE Strength   R Shoulder Internal Rotation 4-/5  (painful)   R Shoulder External Rotation 2+/5   R Shoulder ABduction 2-/5   R Shoulder Flexion 2-/5   LUE Assessment   LUE Assessment X   LUE Overall AROM   L Shoulder Flexion approx 65% full range limited by pain and muscle weakness   L Shoulder ABduction approx 65% full range limited by pain and muscle weakness   LUE Strength   L Shoulder Flexion 3-/5  (limited by pain)   L Shoulder ABduction 3-/5  (limited by pain)   RLE Assessment   RLE Assessment WFL   LLE Assessment   LLE Assessment WFL   Coordination   Movements are Fluid and Coordinated 1   Sensation WFL  (B UE sensation WFL and equal bilaterally )   Light Touch   RLE Light Touch Grossly intact  (occ tingling d/t pt reported diabetic neuropathy)   LLE Light Touch Grossly intact  (occ tingling d/t pt reported diabetic neuropathy)   Sharp/Dull   RLE Sharp/Dull Not tested   LLE Sharp/Dull Not tested   Proprioception   RLE Proprioception Not tested   LLE Proprioception Not tested   Bed Mobility   Supine to Sit 3  Moderate assistance   Additional items Assist x 1; Increased time required;Verbal cues;HOB elevated  (unable to use UE for bed mobility)   Transfers   Sit to Stand 5  Supervision   Additional items Assist x 1;Verbal cues   Stand to Sit 5  Supervision   Additional items Assist x 1;Verbal cues   Additional Comments Orthostatic BPs taken, see below for details  Pt with overall fair+ static standing approx 5 minutes, unsupported      Balance   Static Sitting Good   Dynamic Sitting Fair +   Static Standing Fair +   Dynamic Standing Fair   Endurance Deficit   Endurance Deficit Yes   Endurance Deficit Description required frequent rest breaks during assessment tasks and cueing to breathe while performing assessments and functional mobility   Activity Tolerance   Activity Tolerance Patient limited by pain   Medical Staff Made Aware SLIM and CM at pt rounds   Nurse Made Aware DELFINA Marrero   Assessment   Prognosis Good   Problem List Decreased strength;Decreased range of motion;Decreased endurance;Pain;Decreased mobility; Impaired balance   Assessment Pt is 78 y/o female admitted to the hospital s/p fall on R arm due to dizziness and near syncope resulting in R shoulder pain and weakness  Pt has PMH DM, OA, RA, HTN, GERD, thyroid gland disease, and breast CA with lumpectomy L breast  Pt saw orthopedic physician s/p prior fall 3/25/21 with suspected L RTC tear  PTA, pt was living in a single floor home with 2 steps to enter with her , daughter and granddaughter  Pt was independent with all ADLs and functional mobility without AD  Pt complains of R shoulder pain d/t fall  Pt reports dizziness yesterday after the fall but it has not occurred since  Pt has increased edema in R fingers, hand and  and unable to flex fingers fully or  a fist  Orthostatic BP was taken and negative  Pt presented with inability to voluntary move R arm limited by 8/10 pain and weakness  Pt had pain with PROM, however was able to tolerate 50% of range with shoulder elevation compensation limited by pain  Pt has decreased endurance, B UE weakness, pain and ROM deficits and difficulty with functional mobility and ability to perform ADLs  Pt scored 16/24 on AM-PAC with standardized score of 38 32 indicating need for rehab following d/c  Pt scored 50/100 on Barthel Index indicating increased assistance with ADLs and functional mobility  Recommend additional imaging based on exam findings suggesting potential R RTC tear  Recommend D/C home with OP PT to address B shoulder impairments   Pt with supportive family whom are available to help when needed   Barriers to Discharge None   Barriers to Discharge Comments Pt lives with family who is able to help as needed  Recommend further imaging of B Shoulders due to PT exam findings  Recommend OPPT upon d/c  Goals   Patient Goals "to get home and use my arms"   STG Expiration Date 04/12/21   Short Term Goal #1 PT goals made based on pt's goal to return home and return to PLOF  Pt will be independent with bed mobility in order to promote return to PLOF  Pt will be independent with transfers in order to return to PLOF  Pt will be able to negotiate 2 stairs to enter home safely and return to PLOF  Pt will be able to amb 150ft independently in order to promote safe community ambulation and return to PLOF  Pt will be able to tolerate functional mobility for at least 15 minutes with pain levels </= 4/10  AM-PAC score will increase to >/= 20/24 in order to improve independence with functional mobility  Barthel Index score will increase to >/= 60/10 in order to improve ability to perform ADLs, functional mobility and decrease burden of care  PT Treatment Day 1  (See below for treatment details )   Plan   Treatment/Interventions Bed mobility; Patient/family training; Endurance training; Therapeutic exercise; Functional transfer training;Elevations;Gait training;Equipment eval/education;Spoke to MD;Spoke to nursing;Spoke to case management;OT   PT Frequency Other (Comment)  (3-5x/wk)   Recommendation   PT Discharge Recommendation Return to previous environment with no needs; Other (Comment)  (OP PT)   Additional Comments TREATMENT:   Time In: 0940  Time Out: 1008  Total Treatment Time: 28 minutes    Pt required supervision without AD with sit to stand transfers and amb 30ft from bed to chair  Pt unable to use R UE support for sit to stand transfers d/t pain  Pt safe and steady during short distance amb  Pt educated on importance of elevating arm at rest to prevent swelling in RUE   Pt also educated on continued use of UE as able to avoid further weakness and decrease in ROM  Pt left in chair with legs elevated and R arm elevated on pillows with call bell and phone within reach  Care returned to RN  Continue to recommend OPPT  AM-PAC Basic Mobility Inpatient   Turning in Bed Without Bedrails 2   Lying on Back to Sitting on Edge of Flat Bed 2   Moving Bed to Chair 3   Standing Up From Chair 3   Walk in Room 3   Climb 3-5 Stairs 3   Basic Mobility Inpatient Raw Score 16   Basic Mobility Standardized Score 38 32   Modified [place] Scale   Modified [place] Scale 3   Barthel Index   Feeding 5   Bathing 0   Grooming Score 0   Dressing Score 0   Bladder Score 10   Bowels Score 10   Toilet Use Score 5   Transfers (Bed/Chair) Score 10   Mobility (Level Surface) Score 10   Stairs Score 0   Barthel Index Score 50     The patient's AM-PAC Basic Mobility Inpatient Short Form Raw Score is 16, Standardized Score is 38 32  A standardized score less than 42 9 suggests the patient may benefit from discharge to post-acute rehabilitation services  Please also refer to the recommendation of the Physical Therapist for safe discharge planning  Recommend D/C home with OPPT due to increased family support      Aleja Pahtak PT, DPT   Available via [employer]  NPI # [id number]  [place] License - [id number]  [place] License - [id number]  4/2/2021

## 2021-04-02 NOTE — ASSESSMENT & PLAN NOTE
Patient complaining of right shoulder pain  Reduced range of motion with   difficult to abduct  May have rotator cuff injury /tear  Right shoulder x-ray showing no acute fractures or dislocation, final report pending  Patient has had 2 falls in less than a month  Previous fall was mechanical and she landed on the left shoulder  Patient is for outpatient MRI as outpatient  Before the the fall yesterday involving the right shoulder  Current fall following acute/rapid change of position  CT right shoulder showed No acute osseous abnormality  Moderate acromioclavicular osteoarthritis  0 4 cm right upper lobe pulmonary nodule  Orthopedic consulted-recommended putting him on sling, outpatient MRI and orthopedic follow-up  PT/OT evaluation  Lidocaine patch prescribed on DC along with tramadol     Right shoulder MRI ordered, and referral provided to orthopedic

## 2021-04-02 NOTE — ASSESSMENT & PLAN NOTE
History of a stage IA invasive ductal carcinoma a status post radiation and aromatase inhibitor treatment    No evidence of disease on recent mammogram

## 2021-04-02 NOTE — ASSESSMENT & PLAN NOTE
Patient has a history of obstructive sleep apnea, not on CPAP therapy  Patient states her daughter complains about her snoring, she feels tired and sleepy during the day, when she wakes up she does not feel rested  Recommended follow-up with pulmonology, patient has symptomatic SMOOTH  Overnight pulse oximetry ordered

## 2021-04-02 NOTE — CASE MANAGEMENT
LOS: 1 day  Bundle: No  Readmission: No  Unplanned Readmission Score: N/A    Patient admitted to [facility] under obs on 4/1/21 for postural dizziness with near syncope  CM Met with _Patient on 4/2/21______  to introduce CM, review role, complete initial assessment and discuss DCP  Lives where: [address] [place] [address]  Lives with: , dtr and grand dtr  Home Type & Entry: One story ranch, with garage underneath  10+ steps to enter the front of the house and 2 steps to enter the back  Patient only enters the house from the back  DME: None  ADLs: Independent  VNA history: None  STR history: [facility] 10 yrs ago  Pharmacy Preference & Coverage: [facility] in [place]  Mental [facility]/Drug/ETOH history: No  Dialysis history: No  POA/AD/LW:  is POA if not available dtr is  Income/Employment: SSI  Transportation: Drives self or family drives  PCP: Shanda Jain  Transport Home:     DCP:    CM informed pt of her imaging appt for an MRI on April 8th  Pt was agreeable to appt date  CM spoke with pt about outpatient PT recommendation  Pt was agreeable  Pt was notified of freedom of choice  CM informed attending pt was agreeable to outpatient PT  CM asked if there were any further CM needs pt denied having any questions or concerns at this time  No further CM needs  CM reviewed discharge planning process including the following: identifying caregivers at home, preference for d/c planning needs,   availability of [facility] to [facility] program, availability of treatment team to discuss questions or concerns patient and/or family may have regarding diagnosis, plan of care, old or new medications and discharge planning   CM will continue to follow for care coordination and update assessment as appropriate

## 2021-04-02 NOTE — ASSESSMENT & PLAN NOTE
Patient has a history of thyroid nodules, last thyroid ultrasound in November 2020 showed dominant solid nodule in the mid/lower right thyroid lobe measuring up to 2 5 cm, previously biopsied for benign disease  Continue levothyroxine home dose

## 2021-04-02 NOTE — PLAN OF CARE
Problem: PAIN - ADULT  Goal: Verbalizes/displays adequate comfort level or baseline comfort level  Description: Interventions:  - Encourage patient to monitor pain and request assistance  - Assess pain using appropriate pain scale  - Administer analgesics based on type and severity of pain and evaluate response  - Implement non-pharmacological measures as appropriate and evaluate response  - Consider cultural and social influences on pain and pain management  - Notify physician/advanced practitioner if interventions unsuccessful or patient reports new pain  Outcome: Not Progressing     Problem: SAFETY ADULT  Goal: Patient will remain free of falls  Description: INTERVENTIONS:  - Assess patient frequently for physical needs  -  Identify cognitive and physical deficits and behaviors that affect risk of falls    -  [place] fall precautions as indicated by assessment   - Educate patient/family on patient safety including physical limitations  - Instruct patient to call for assistance with activity based on assessment  - Modify environment to reduce risk of injury  - Consider OT/PT consult to assist with strengthening/mobility  Outcome: Not Progressing  Goal: Maintain or return to baseline ADL function  Description: INTERVENTIONS:  -  Assess patient's ability to carry out ADLs; assess patient's baseline for ADL function and identify physical deficits which impact ability to perform ADLs (bathing, care of mouth/teeth, toileting, grooming, dressing, etc )  - Assess/evaluate cause of self-care deficits   - Assess range of motion  - Assess patient's mobility; develop plan if impaired  - Assess patient's need for assistive devices and provide as appropriate  - Encourage maximum independence but intervene and supervise when necessary  - Involve family in performance of ADLs  - Assess for home care needs following discharge   - Consider OT consult to assist with ADL evaluation and planning for discharge  - Provide patient education as appropriate  Outcome: Not Progressing  Goal: Maintain or return mobility status to optimal level  Description: INTERVENTIONS:  - Assess patient's baseline mobility status (ambulation, transfers, stairs, etc )    - Identify cognitive and physical deficits and behaviors that affect mobility  - Identify mobility aids required to assist with transfers and/or ambulation (gait belt, sit-to-stand, lift, walker, cane, etc )  - [place] fall precautions as indicated by assessment  - Record patient progress and toleration of activity level on Mobility SBAR; progress patient to next Phase/Stage  - Instruct patient to call for assistance with activity based on assessment  - Consider rehabilitation consult to assist with strengthening/weightbearing, etc   Outcome: Not Progressing     Problem: DISCHARGE PLANNING  Goal: Discharge to home or other facility with appropriate resources  Description: INTERVENTIONS:  - Identify barriers to discharge w/patient and caregiver  - Arrange for needed discharge resources and transportation as appropriate  - Identify discharge learning needs (meds, wound care, etc )  - Arrange for interpretive services to assist at discharge as needed  - Refer to Case Management Department for coordinating discharge planning if the patient needs post-hospital services based on physician/advanced practitioner order or complex needs related to functional status, cognitive ability, or social support system  Outcome: Not Progressing     Problem: Knowledge Deficit  Goal: Patient/family/caregiver demonstrates understanding of disease process, treatment plan, medications, and discharge instructions  Description: Complete learning assessment and assess knowledge base    Interventions:  - Provide teaching at level of understanding  - Provide teaching via preferred learning methods  Outcome: Not Progressing

## 2021-04-03 VITALS
TEMPERATURE: 97 F | BODY MASS INDEX: 40.48 KG/M2 | DIASTOLIC BLOOD PRESSURE: 83 MMHG | OXYGEN SATURATION: 94 % | HEART RATE: 85 BPM | SYSTOLIC BLOOD PRESSURE: 152 MMHG | WEIGHT: 220 LBS | HEIGHT: 62 IN | RESPIRATION RATE: 20 BRPM

## 2021-04-03 LAB
ANION GAP SERPL CALCULATED.3IONS-SCNC: 9 MMOL/L (ref 4–13)
BASOPHILS # BLD AUTO: 0.03 THOUSANDS/ΜL (ref 0–0.1)
BASOPHILS NFR BLD AUTO: 0 % (ref 0–1)
BUN SERPL-MCNC: 12 MG/DL (ref 6–20)
CALCIUM SERPL-MCNC: 9.6 MG/DL (ref 8.4–10.2)
CHLORIDE SERPL-SCNC: 106 MMOL/L (ref 96–108)
CO2 SERPL-SCNC: 26 MMOL/L (ref 22–33)
CREAT SERPL-MCNC: 0.74 MG/DL (ref 0.4–1.1)
EOSINOPHIL # BLD AUTO: 0.1 THOUSAND/ΜL (ref 0–0.61)
EOSINOPHIL NFR BLD AUTO: 1 % (ref 0–6)
ERYTHROCYTE [DISTWIDTH] IN BLOOD BY AUTOMATED COUNT: 14.5 % (ref 11.6–15.1)
GFR SERPL CREATININE-BSD FRML MDRD: 76 ML/MIN/1.73SQ M
GLUCOSE SERPL-MCNC: 118 MG/DL (ref 65–140)
GLUCOSE SERPL-MCNC: 127 MG/DL (ref 65–140)
GLUCOSE SERPL-MCNC: 128 MG/DL (ref 65–140)
HCT VFR BLD AUTO: 41.7 % (ref 34.8–46.1)
HGB BLD-MCNC: 13.6 G/DL (ref 11.5–15.4)
IMM GRANULOCYTES # BLD AUTO: 0.04 THOUSAND/UL (ref 0–0.2)
IMM GRANULOCYTES NFR BLD AUTO: 1 % (ref 0–2)
LYMPHOCYTES # BLD AUTO: 3.91 THOUSANDS/ΜL (ref 0.6–4.47)
LYMPHOCYTES NFR BLD AUTO: 46 % (ref 14–44)
MCH RBC QN AUTO: 29.2 PG (ref 26.8–34.3)
MCHC RBC AUTO-ENTMCNC: 32.6 G/DL (ref 31.4–37.4)
MCV RBC AUTO: 90 FL (ref 82–98)
MONOCYTES # BLD AUTO: 0.55 THOUSAND/ΜL (ref 0.17–1.22)
MONOCYTES NFR BLD AUTO: 6 % (ref 4–12)
NEUTROPHILS # BLD AUTO: 3.94 THOUSANDS/ΜL (ref 1.85–7.62)
NEUTS SEG NFR BLD AUTO: 46 % (ref 43–75)
PLATELET # BLD AUTO: 263 THOUSANDS/UL (ref 149–390)
PMV BLD AUTO: 10.4 FL (ref 8.9–12.7)
POTASSIUM SERPL-SCNC: 3.9 MMOL/L (ref 3.5–5)
RBC # BLD AUTO: 4.65 MILLION/UL (ref 3.81–5.12)
SODIUM SERPL-SCNC: 141 MMOL/L (ref 133–145)
WBC # BLD AUTO: 8.57 THOUSAND/UL (ref 4.31–10.16)

## 2021-04-03 PROCEDURE — 85025 COMPLETE CBC W/AUTO DIFF WBC: CPT | Performed by: INTERNAL MEDICINE

## 2021-04-03 PROCEDURE — 99217 PR OBSERVATION CARE DISCHARGE MANAGEMENT: CPT | Performed by: INTERNAL MEDICINE

## 2021-04-03 PROCEDURE — 82948 REAGENT STRIP/BLOOD GLUCOSE: CPT

## 2021-04-03 PROCEDURE — 80048 BASIC METABOLIC PNL TOTAL CA: CPT | Performed by: INTERNAL MEDICINE

## 2021-04-03 RX ORDER — LIDOCAINE 50 MG/G
1 PATCH TOPICAL DAILY
Qty: 15 PATCH | Refills: 0 | Status: SHIPPED | OUTPATIENT
Start: 2021-04-04 | End: 2021-04-19

## 2021-04-03 RX ORDER — TRAMADOL HYDROCHLORIDE 50 MG/1
50 TABLET ORAL EVERY 8 HOURS PRN
Qty: 15 TABLET | Refills: 0 | Status: SHIPPED | OUTPATIENT
Start: 2021-04-03 | End: 2021-04-13

## 2021-04-03 RX ORDER — PANTOPRAZOLE SODIUM 40 MG/1
40 TABLET, DELAYED RELEASE ORAL
Qty: 30 TABLET | Refills: 0 | Status: SHIPPED | OUTPATIENT
Start: 2021-04-04 | End: 2021-11-04

## 2021-04-03 RX ADMIN — AMLODIPINE BESYLATE 10 MG: 10 TABLET ORAL at 11:09

## 2021-04-03 RX ADMIN — LIDOCAINE 5% 1 PATCH: 700 PATCH TOPICAL at 11:09

## 2021-04-03 RX ADMIN — LEVOTHYROXINE SODIUM 88 MCG: 88 TABLET ORAL at 05:28

## 2021-04-03 RX ADMIN — ENOXAPARIN SODIUM 40 MG: 40 INJECTION SUBCUTANEOUS at 11:10

## 2021-04-03 RX ADMIN — PANTOPRAZOLE SODIUM 40 MG: 40 TABLET, DELAYED RELEASE ORAL at 05:28

## 2021-04-03 NOTE — DISCHARGE SUMMARY
Julito U  65   Discharge- Bia Hy 1940, 79 y o  female MRN: [id number]  Unit/Bed#: -01 Encounter: [id number]  Primary Care Provider: Cristino Roy MD   Date and time admitted to hospital: 4/1/2021  5:00 PM    Right shoulder pain  Assessment & Plan  Patient complaining of right shoulder pain  Reduced range of motion with   difficult to abduct  May have rotator cuff injury /tear  Right shoulder x-ray showing no acute fractures or dislocation, final report pending  Patient has had 2 falls in less than a month  Previous fall was mechanical and she landed on the left shoulder  Patient is for outpatient MRI as outpatient  Before the the fall yesterday involving the right shoulder  Current fall following acute/rapid change of position  CT right shoulder showed No acute osseous abnormality  Moderate acromioclavicular osteoarthritis  0 4 cm right upper lobe pulmonary nodule  Orthopedic consulted-recommended putting him on sling, outpatient MRI and orthopedic follow-up  PT/OT evaluation  Lidocaine patch prescribed on DC along with tramadol  Right shoulder MRI ordered, and referral provided to orthopedic         Obstructive sleep apnea  Assessment & Plan  Patient has a history of obstructive sleep apnea, not on CPAP therapy  Patient states her daughter complains about her snoring, she feels tired and sleepy during the day, when she wakes up she does not feel rested  Recommended follow-up with pulmonology, patient has symptomatic SMOOTH  Discuss with PCP as an outpatient     Transaminitis  Assessment & Plan  History of transaminitis after administration of methotrexate, AST on admission was mildly elevated  No changes in mental status, right upper quadrant abdominal pain  No jaundice or hepatomegaly on physical examination    Follow-up with primary care provider as an outpatient    Breast cancer [facility])  Assessment & Plan  History of a stage IA invasive ductal carcinoma a status post radiation and aromatase inhibitor treatment  No evidence of disease on recent mammogram       Hypokalemia  Assessment & Plan  Potassium on admission was3 1--resolved during hospitalization       [address]  Patient presented to the ED after a fall at home, patient stated that she felt lightheaded and fell  No chest pain, no palpitations, no headache  CT of the head no intracranial abnormality  Patient complaining of right shoulder pain, x-ray of the shoulder showing no acute fractures or dislocation  Patient has had 2 falls in less than a month  Previous fall was mechanical and she landed on the left shoulder  Patient is for outpatient MRI as outpatient  Before the the fall yesterday involving the right shoulder  Current fall following acute/rapid change of position  CT right shoulder showed No acute osseous abnormality  Moderate acromioclavicular osteoarthritis  0 4 cm right upper lobe pulmonary nodule  Orthopedic consulted-recommended putting him on sling, outpatient MRI and orthopedic follow-up  PT/OT evaluation    Ordered right shoulder MRI at the time of discharge and referral to orthopedic      Hypertension  Assessment & Plan  Patient is on multiple medications at home to control blood pressure including amlodipine 10 mg, losartan 100 mg and Lasix 40 mg daily  Patient states she feels lightheaded when she changes her position-however orthostatic vital signs were negative  Continue amlodipine 10 mg daily, continue losartan 100 and Lasix 40 mg daily  Disease of thyroid gland  Assessment & Plan  Patient has a history of thyroid nodules, last thyroid ultrasound in November 2020 showed dominant solid nodule in the mid/lower right thyroid lobe measuring up to 2 5 cm, previously biopsied for benign disease  Continue levothyroxine home dose  GERD (gastroesophageal reflux disease)  Assessment & Plan  History of GERD, continue Protonix 40 mg daily      Diabetes mellitus [facility])  Assessment & Plan  Lab Results   Component Value Date    HGBA1C 6 7 (H) 04/01/2021       Recent Labs     04/01/21  2156 04/02/21  0722 04/02/21  1116 04/02/21  1558   POCGLU 147* 123 124 135       Blood Sugar Average: Last 72 hrs:  (P) 132  25Patient takes metformin 1000 mg twice a day which was held during admission  Glucose on admission was 210, was on sliding scale insulin and hypoglycemia protocol  Hemoglobin A1--6 7   Can resume home medications at the time of discharge    Arthritis  Assessment & Plan  Patient has a history of seronegative rheumatoid arthritis diagnosed in 2015 after she presented with arthritic complaints affecting her bilateral hands and wrist with soft tissue swelling  Patient was on methotrexate and prednisone which helped initially with her symptoms, however there were discontinue in 2017 due to transaminitis and afterwards she was lost to rheumatology follow-up  She also received IV Orencia which was ineffective  seronegative rheumatoid arthritis that was diagnosed in 2015 after she presented with arthritic complaints affecting her bilateral hands and wrists, with soft tissue swelling noted of these joints based on her prior rheumatology note  Otis Wetzel was initially started on a combination of methotrexate and prednisone which she recalls as helping with her symptoms, but they were discontinued in 2017 due to transaminitis and afterwards she was lost to Rheumatology follow-up   Other than these medications she was also trialed on IV Orencia which was ineffective  She is following again with rheumatologist as an outpatient, x-rays has been unremarkable, no clear synovitis on physical examination  Normal anti CCP antibody and C-reactive protein  ESR and rheumatoid factor are pending, along with ultrasound of hands and wrist   She is currently taking only Tylenol and NSAIDs as needed for pain       * Postural dizziness with near syncope  Assessment & Plan  Patient presented to ED after a fall at home, previous to that she felt lightheaded, no chest pain, no palpitations, no shortness of breath  CT of the head unremarkable, x-ray and CT of the show right shoulder showed no acute osseous abnormality  Showed moderate acromioclavicular arthritis  Patient stated she feels dizzy when she changes her position from lying/ sitting to standing -0 however orthostatic vital signs were negative  Troponin was normal  Continue to monitor on telemetry  Patient received 1 L of NS in the ED, patient was continued on gentle IV hydration during hospitalization  Blood work showed mild hypokalemia and hyperglycemia which normalized  ECHO showed normal EF of 60%, G1DD, carotid duplex showed < 50% stenosis bilaterally  Discharging Physician / Practitioner: James Rick MD  PCP: Shahid Garcia MD  Admission Date:   Admission Orders (From admission, onward)     Ordered        04/01/21 1837  Place in Observation  Once                   Discharge Date: 04/03/21    Resolved Problems  Date Reviewed: 4/1/2021    None          Consultations During Hospital Stay:  · Orthopedic    Procedures Performed:   · None    Significant Findings / Test Results:   · CT right upper extremity without contrast-no acute osseous abnormality, moderate acromioclavicular osteoarthritis, incidentally detected 0 4 cm right upper pulmonary nodule, low risk no routine follow-up is needed based on Fleischner Society criteria  · Carotid duplex-less than 50% stenosis noted bilaterally in the internal carotid arteries  Repeat duplex recommended in 2 years to follow-up for plaque progression  · Echo showed EF of 67%, grade 1 diastolic dysfunction  Incidental Findings:   · 0 4 cm right upper pulmonary nodule  Test Results Pending at Discharge (will require follow up): · None     Outpatient Tests Requested:  · MRI right upper extremity    Complications:  None    Reason for Admission: [facility] Course:      Vincent Maier Dominic Alba is a 79 y o  female patient who originally presented to the hospital on 4/1/2021 due to fall at home  Patient has a past medical history of hypertension, SMOOTH, type 2 diabetes mellitus, breast cancer in remission, hypothyroidism, GERD  Patient had a fall at home, previously to that she felt lightheaded but denied any chest pain, palpitations, shortness of breath, visual disturbances, weakness or numbness  Patient had 2 falls in less than 1 month, reported dizziness when she changed her position  She has a history of seronegative rheumatoid arthritis currently not on any disease modifying drugs  In the ED patient was initially afebrile, vitals were stable except for mild elevation in blood pressure  Labs were significant for mild hypokalemia, mildly elevated glucose any ST   CT of the head showed microangiopathic changes but no acute intracranial abnormalities  X-ray of the chest in shoulder showed no acute pathology, patient was admitted for near syncope causing fall, ambulatory dysfunction and right shoulder pain  Orthopedic was consulted who recommended putting the patient on sling, outpatient MRI and PT OT evaluation with outpatient orthopedic follow-up  Patient is already scheduled for outpatient MRI for evaluation of the left shoulder  For near-syncope, patient had initial EKG which was negative for acute ST-T changes  Echocardiogram revealed EF of 35%, grade 1 diastolic dysfunction  Carotid duplex revealed less than 50% stenosis in the bilateral internal carotid arteries  Orthostatic vital signs were found to be negative  At the time of discharge, the patient is afebrile and hemodynamically stable  She reports improvement in her dizziness  Patient has been discharged with lidocaine patch to be applied on the right shoulder, Protonix and tramadol    Patient has also been provided a script to obtain MRI of the right shoulder without contrast and discussed results with the PCP and orthopedic  Referral was provided for home physical and occupational therapy along with Orthopedic  Please see above list of diagnoses and related plan for additional information  Condition at Discharge: stable     Discharge Day Visit / Exam:     Subjective:  Patient is stable, afebrile and hemodynamically stable  Vitals: Blood Pressure: 152/83 (04/03/21 0745)  Pulse: 85 (04/03/21 0745)  Temperature: (!) 97 °F (36 1 °C) (04/03/21 0745)  Temp Source: Tympanic (04/03/21 0745)  Respirations: 20 (04/03/21 0745)  Height: 5' 2" (157 5 cm) (04/01/21 1659)  Weight - Scale: 99 8 kg (220 lb) (04/01/21 1659)  SpO2: 94 % (04/03/21 0745)  Exam:   Physical Exam  Vitals signs and nursing note reviewed  Constitutional:       General: She is not in acute distress  Appearance: She is well-developed  She is obese  She is not ill-appearing or toxic-appearing  HENT:      Head: Normocephalic and atraumatic  Mouth/Throat:      Mouth: Mucous membranes are moist    Eyes:      General:         Right eye: No discharge  Left eye: No discharge  Conjunctiva/sclera: Conjunctivae normal    Neck:      Musculoskeletal: Neck supple  Cardiovascular:      Rate and Rhythm: Normal rate and regular rhythm  Pulses: Normal pulses  Heart sounds: Normal heart sounds  No murmur  Pulmonary:      Effort: Pulmonary effort is normal  No respiratory distress  Breath sounds: Normal breath sounds  Abdominal:      General: Abdomen is flat  Bowel sounds are normal  There is no distension  Palpations: Abdomen is soft  There is no mass  Tenderness: There is no abdominal tenderness  There is no guarding  Hernia: No hernia is present  Musculoskeletal:         General: Tenderness (Right shoulder) present  No swelling or deformity  Right lower leg: No edema  Left lower leg: No edema  Comments: Severely limited range of motion in the right and left shoulder  Worse in the right shoulder  Tenderness on joint palpation  No sensory deficit  Skin:     General: Skin is warm and dry  Capillary Refill: Capillary refill takes less than 2 seconds  Neurological:      General: No focal deficit present  Mental Status: She is alert and oriented to person, place, and time  Cranial Nerves: No cranial nerve deficit  Sensory: No sensory deficit  Motor: No weakness  Coordination: Coordination normal       Gait: Gait normal    Psychiatric:         Mood and Affect: Mood normal          Behavior: Behavior normal          Discharge instructions/Information to patient and family:   See after visit summary for information provided to patient and family  Provisions for Follow-Up Care:  See after visit summary for information related to follow-up care and any pertinent home health orders  Disposition:     Home    For Discharges to [address] [facility]:   · Not Applicable to this Patient - Not Applicable to this Patient       Discharge Statement:  I spent 35 minutes discharging the patient  This time was spent on the day of discharge  I had direct contact with the patient on the day of discharge  Greater than 50% of the total time was spent examining patient, answering all patient questions, arranging and discussing plan of care with patient as well as directly providing post-discharge instructions  Additional time then spent on discharge activities  Discharge Medications:  See after visit summary for reconciled discharge medications provided to patient and family        ** Please Note: This note has been constructed using a voice recognition system **

## 2021-04-03 NOTE — DISCHARGE INSTRUCTIONS
· Please take lidocaine patch 12 hours on and 12 hours off   · Please take pantoprazole 40 mg 1 tab by mouth daily  · Please take tramadol 50 mg every 8 hours as needed for moderate pain up to 10 days    Follow up tests :   1  Please obtain MRI of the right shoulder without contrast and discuss results with your PCP and the orthopedic      Referrals :   1  Referral has been provided to orthopedic  Please ensure to follow up with them   2  Referral has been provided to physical and occupational therapy

## 2021-04-03 NOTE — ASSESSMENT & PLAN NOTE
Patient is on multiple medications at home to control blood pressure including amlodipine 10 mg, losartan 100 mg and Lasix 40 mg daily  Patient states she feels lightheaded when she changes her position-however orthostatic vital signs were negative  Continue amlodipine 10 mg daily, continue losartan 100 and Lasix 40 mg daily

## 2021-04-03 NOTE — PLAN OF CARE
Problem: PAIN - ADULT  Goal: Verbalizes/displays adequate comfort level or baseline comfort level  Description: Interventions:  - Encourage patient to monitor pain and request assistance  - Assess pain using appropriate pain scale  - Administer analgesics based on type and severity of pain and evaluate response  - Implement non-pharmacological measures as appropriate and evaluate response  - Consider cultural and social influences on pain and pain management  - Notify physician/advanced practitioner if interventions unsuccessful or patient reports new pain  Outcome: Progressing     Problem: SAFETY ADULT  Goal: Patient will remain free of falls  Description: INTERVENTIONS:  - Assess patient frequently for physical needs  -  Identify cognitive and physical deficits and behaviors that affect risk of falls  -  [place] fall precautions as indicated by assessment   - Educate patient/family on patient safety including physical limitations  - Instruct patient to call for assistance with activity based on assessment  - Modify environment to reduce risk of injury  - Consider OT/PT consult to assist with strengthening/mobility  Outcome: Progressing     Problem: Potential for Falls  Goal: Patient will remain free of falls  Description: INTERVENTIONS:  - Assess patient frequently for physical needs  -  Identify cognitive and physical deficits and behaviors that affect risk of falls    -  [place] fall precautions as indicated by assessment   - Educate patient/family on patient safety including physical limitations  - Instruct patient to call for assistance with activity based on assessment  - Modify environment to reduce risk of injury  - Consider OT/PT consult to assist with strengthening/mobility  Outcome: Progressing

## 2021-04-03 NOTE — ASSESSMENT & PLAN NOTE
Patient has a history of obstructive sleep apnea, not on CPAP therapy  Patient states her daughter complains about her snoring, she feels tired and sleepy during the day, when she wakes up she does not feel rested  Recommended follow-up with pulmonology, patient has symptomatic SMOOTH     Discuss with PCP as an outpatient

## 2021-04-05 ENCOUNTER — TELEPHONE (OUTPATIENT)
Dept: OBGYN CLINIC | Facility: HOSPITAL | Age: 81
End: 2021-04-05

## 2021-04-05 NOTE — TELEPHONE ENCOUNTER
MRI of the right shoulder is already ordered in her chart, please provide her the central scheduling number to call to schedule this

## 2021-04-05 NOTE — TELEPHONE ENCOUNTER
Patient is calling to leave a message for Dr Nithin Pike   Patient states that since seeing Dr Nithin Pike on 03-, she has taken a fall and injured her right shoulder on 04-01-21  She was advised by the staff at [facility] that she ask Dr Nithin Pike to add the right shoulder to her MRI order  Please advise        Izaiah Torres can be reached at [phone]

## 2021-04-05 NOTE — TELEPHONE ENCOUNTER
Patient is scheduled for the left shoulder MRI on 04-08-21  Patient had hoped to have both shoulders done at the same time  Please advise patient that the right shoulder MRI has already been ordered by another provider

## 2021-04-05 NOTE — TELEPHONE ENCOUNTER
I called and spoke to Clau Abebe and let her know that there is an order in her chart for an MRI of the right shoulder  I did advise her to call central scheduling and make them aware of this additional order

## 2021-04-12 ENCOUNTER — HOSPITAL ENCOUNTER (OUTPATIENT)
Dept: RADIOLOGY | Facility: IMAGING CENTER | Age: 81
Discharge: HOME | End: 2021-04-12
Payer: MEDICARE

## 2021-04-12 DIAGNOSIS — M25.511 ACUTE PAIN OF RIGHT SHOULDER: ICD-10-CM

## 2021-04-12 DIAGNOSIS — M19.90 ARTHRITIS: ICD-10-CM

## 2021-04-12 DIAGNOSIS — S46.012A TRAUMATIC TEAR OF LEFT ROTATOR CUFF, UNSPECIFIED TEAR EXTENT, INITIAL ENCOUNTER: ICD-10-CM

## 2021-04-12 PROCEDURE — 73221 MRI JOINT UPR EXTREM W/O DYE: CPT

## 2021-04-12 PROCEDURE — G1004 CDSM NDSC: HCPCS

## 2021-04-14 ENCOUNTER — OFFICE VISIT (OUTPATIENT)
Dept: OBGYN CLINIC | Facility: CLINIC | Age: 81
End: 2021-04-14
Payer: MEDICARE

## 2021-04-14 VITALS — BODY MASS INDEX: 41.22 KG/M2 | HEIGHT: 62 IN | WEIGHT: 224 LBS

## 2021-04-14 DIAGNOSIS — M19.90 ARTHRITIS: ICD-10-CM

## 2021-04-14 DIAGNOSIS — S46.012D TRAUMATIC INCOMPLETE TEAR OF LEFT ROTATOR CUFF, SUBSEQUENT ENCOUNTER: Primary | ICD-10-CM

## 2021-04-14 DIAGNOSIS — M25.511 ACUTE PAIN OF RIGHT SHOULDER: ICD-10-CM

## 2021-04-14 PROCEDURE — 99212 OFFICE O/P EST SF 10 MIN: CPT | Performed by: ORTHOPAEDIC SURGERY

## 2021-04-14 NOTE — PROGRESS NOTES
Assessment:  1  Traumatic incomplete tear of left rotator cuff, subsequent encounter     2  Acute pain of right shoulder  Ambulatory referral to Orthopedic Surgery   3  Arthritis  Ambulatory referral to Orthopedic Surgery     Patient Active Problem List   Diagnosis    Personal history of breast cancer    Screening mammogram, encounter for    S/P trigger finger release    Intrinsic muscle tightness    Encounter for follow-up surveillance of breast cancer    Arthritis    Diabetes mellitus ([address] )    GERD (gastroesophageal reflux disease)    Disease of thyroid gland    Hypertension    Postural dizziness with near syncope    Fall    Hypokalemia    Breast cancer (HCC)    Transaminitis    Obstructive sleep apnea    Right shoulder pain           Plan       left shoulder rotator cuff tear very small anterior leading edge tear  Otherwise some March 3rd changes in the [facility] joint and some subacromial bursitis  We talked about surgical intervention versus rehab told her I would lean towards more rehab because it is very small  And that there is more bursitis in arthritis that we can always give injections for  She also stopped in today because she had a new injury to her right shoulder we did not go into full examination on the right shoulder but we did review the MRI with her which shows a massive tear of the right rotator cuff with some mild glenohumeral arthritis as well as severe osteoarthritis of the AC joint  We talked about rotator cuff repair versus reverse total shoulder  I gave her the pros and cons of both because she is 79 and she does have arthritis a reverse total shoulder is not out of the realm of of treatment options    I also talked about the rotator cuff that is less invasive it takes about 3-4 months to recover but it does not guarantee that she will heal this given that she is 79years old the rotator cuff repair may not heal and then she would end up having a reverse total shoulder anyway  She wants to think things over with regards to her right shoulder I did tell her to ask for a new injury type of visit for the next visit for her right shoulder this way we can fully examine the right shoulder  Subjective:     Patient ID:    Chief Krysta Joiner 79 y o  female      HPI      Patient comes in today with regards to her left shoulder she was sent for an MRI 3 evaluate for possible rotator cuff tear  She subsequently had a fall on her right shoulder  She would like to be evaluated for that  I did explain to her that due to time constraints and patient satisfaction that I would like to see her for her left shoulder and we would briefly discuss the right shoulder but we would make another appointment for her right shoulder  The following portions of the patient's history were reviewed and updated as appropriate: allergies, current medications, past family history, past social history, past surgical history and problem list     All organ systems normal    Social History     Socioeconomic History    Marital status: /Civil Union     Spouse name: Not on file    Number of children: Not on file    Years of education: Not on file    Highest education level: Not on file   Occupational History    Not on file   Social Needs    Financial resource strain: Not on file    Food insecurity     Worry: Not on file     Inability: Not on file   [employer] needs     Medical: Not on file     Non-medical: Not on file   Tobacco Use    Smoking status: Never Smoker    Smokeless tobacco: Never Used   Substance and Sexual Activity    Alcohol use:  Yes    Drug use: No    Sexual activity: Not on file   Lifestyle    Physical activity     Days per week: Not on file     Minutes per session: Not on file    Stress: Not on file   Relationships    Social connections     Talks on phone: Not on file     Gets together: Not on file     Attends Gnosticist service: Not on file Active member of club or organization: Not on file     Attends meetings of clubs or organizations: Not on file     Relationship status: Not on file    Intimate partner violence     Fear of current or ex partner: Not on file     Emotionally abused: Not on file     Physically abused: Not on file     Forced sexual activity: Not on file   Other Topics Concern    Not on file   Social History Narrative    Not on file     Past Medical History:   Diagnosis Date    Arthritis     Diabetes mellitus [address] [address]     Disease of thyroid gland     GERD (gastroesophageal reflux disease)     Headache     Hemorrhoids     Hx of radiation therapy 03/09/2012    PBRT    Hypertension     Hypertension     Night sweats     Rheumatoid arthritis ([facility] [address]     Trigger finger     right and left hand    Urinary incontinence     Use of anastrozole (Arimidex)     Use of letrozole (Femara)      Past Surgical History:   Procedure Laterality Date    ABDOMINAL SURGERY      APPENDECTOMY      BREAST BIOPSY Left 01/162012    [facility]    BREAST LUMPECTOMY Left 02/17/2012    BREAST SURGERY      CATARACT EXTRACTION      CHOLECYSTECTOMY      FOOT SURGERY      right and left foot    HYSTERECTOMY      JOINT REPLACEMENT      KNEE SURGERY      UT INCISE FINGER TENDON SHEATH Right 8/7/2018    Procedure: RELEASE TRIGGER FINGER - LONG FINGER AND RING FINGER;  Surgeon: Gema Harvey MD;  [facility]: [facility];  Service: Orthopedics    UT INCISE FINGER TENDON SHEATH Left 8/14/2018    Procedure: RELEASE TRIGGER FINGER - LEFT INDEX FINGER;  Surgeon: Gema Harvey MD;  [facility];  Service: Orthopedics    REPAIR RECTOCELE      TONSILLECTOMY      TUBAL LIGATION       Allergies   Allergen Reactions    Sulfa Antibiotics Rash    Penicillins Rash     Current Outpatient Medications on File Prior to Visit   Medication Sig Dispense Refill    acetaminophen (TYLENOL 8 HOUR) 650 mg CR tablet Take 1 tablet (650 mg total) by mouth every 8 (eight) hours 15 tablet 0    amLODIPine (NORVASC) 10 mg tablet Take 10 mg by mouth daily   calcium citrate-vitamin D (CITRACAL+D) 315-200 MG-UNIT per tablet Take 1 tablet by mouth daily   furosemide (LASIX) 40 mg tablet Take 40 mg by mouth daily   Lancets (OneTouch Delica Plus [id number]) MISC TEST BLOOD GLUCOSE DAILY      lidocaine (LIDODERM) 5 % Apply 1 patch topically daily for 15 days Remove & Discard patch within 12 hours or as directed by MD 15 patch 0    losartan (COZAAR) 100 MG tablet Take 100 mg by mouth daily   metFORMIN (GLUCOPHAGE) 1000 MG tablet Take 1,000 mg by mouth 2 (two) times a day with meals   Multiple Vitamin (MULTIVITAMIN) tablet Take 1 tablet by mouth daily   OneTouch Verio test strip use 1 TEST STRIP to TEST BLOOD SUGAR once daily      pantoprazole (PROTONIX) 40 mg tablet Take 1 tablet (40 mg total) by mouth daily in the early morning 30 tablet 0    SYNTHROID 88 MCG tablet       [] traMADol (ULTRAM) 50 mg tablet Take 1 tablet (50 mg total) by mouth every 8 (eight) hours as needed for moderate pain for up to 10 days 15 tablet 0     No current facility-administered medications on file prior to visit  Objective:        Right Shoulder Exam     Range of Motion   Active abduction: 40       Left Shoulder Exam     Range of Motion   The patient has normal left shoulder ROM  I have personally reviewed pertinent films in PACS  and I have personally reviewed pertinent films in PACS and my interpretation is Massive rotator cuff tear of the right side AC joint arthritis mild glenohumeral arthritis bilaterally very small anterior leading edge tear of the rotator cuff on the left side      Portions of the record may have been created with voice recognition software   Occasional wrong word or "sound a like" substitutions may have occurred due to the inherent limitations of voice recognition software   Read the chart carefully and recognize, using context, where substitutions have occurred

## 2021-04-20 ENCOUNTER — OFFICE VISIT (OUTPATIENT)
Dept: DERMATOLOGY | Facility: CLINIC | Age: 81
End: 2021-04-20
Payer: MEDICARE

## 2021-04-20 VITALS — BODY MASS INDEX: 41.41 KG/M2 | HEIGHT: 62 IN | WEIGHT: 225 LBS | TEMPERATURE: 97.4 F

## 2021-04-20 DIAGNOSIS — D48.5 NEOPLASM OF UNCERTAIN BEHAVIOR OF SKIN: Primary | ICD-10-CM

## 2021-04-20 PROCEDURE — 88305 TISSUE EXAM BY PATHOLOGIST: CPT | Performed by: STUDENT IN AN ORGANIZED HEALTH CARE EDUCATION/TRAINING PROGRAM

## 2021-04-20 PROCEDURE — 99203 OFFICE O/P NEW LOW 30 MIN: CPT | Performed by: DERMATOLOGY

## 2021-04-20 PROCEDURE — 11102 TANGNTL BX SKIN SINGLE LES: CPT | Performed by: DERMATOLOGY

## 2021-04-20 NOTE — PATIENT INSTRUCTIONS
SKIN SHAVE BIOPSY  Rationale for Procedure  A skin shave biopsy allows the dermatologist to further examine a lesion or rash under the microscope to potentially obtain a more specific diagnosis  It usually involves numbing the area with numbing medication and removing a small piece of skin  Usually, the area will be closed with sutures at the end of the procedure  Sutures are not usually needed  Description of Procedure  We would like to perform a skin shave biopsy today  A local anesthetic, similar to the kind that a dentist uses when filling a cavity, will be injected with a very small needle into the skin area to be sampled  The injected skin and tissue underneath should go to sleep and become numbed so that no further pain should be felt  An instrument shaped like a tiny "razor blade" (i e , the shave biopsy instrument) will be used to cut a small round piece of skin and tissue from the area so that the sample may be taken and examined more closely under the microscope  A slight amount of bleeding will occur, but it is usually stopped with direct pressure, chemical cautery, and/or a pressure bandage; rarely, electrocautery and other means of intervention may be necessary to help stop the bleeding  Sutures are not usually needed  Surgical Vaseline-type ointment will also applied after the procedure to help create a barrier between the wound and the outside world      Risks and Potential Complications  While the advantage of a skin shave biopsy is that it allows us to potentially examine the skin more closely under the microscope, there are some risks and potential complications that include but are not limited to the following:  - Bleeding  - Infection  - Pain  - Scar/keloid  - Skin discoloration  - Incomplete removal of the lesion or rash being sampled (in other words, this procedure is intended as a sampling and is not considered a definitive treatment)  - Recurrence of the lesion or rash being sampled  - Nerve Damage/Numbness/Loss of Function  - Allergic Reaction to Anesthesia  - Biopsies are diagnostic procedures and, based on findings, additional treatment or evaluation may be required (in other words, this procedure is intended as a sampling and is not considered a definitive treatment)  - Loss or destruction of the sample specimen could result in no additional findings  - The person at the microscope may not be able to provide additional information other than what we already know or suspect  What You Will Need to Do After the Procedure  1  Keep the area clean and dry  Try NOT to remove the bandage for the first 24 hours  2  Gently clean the area and apply Vaseline ointment (this is over the counter and not a prescription) to the biopsy site for up to 2 weeks  3  Generally, sutures are not needed  If any sutures were placed, return for suture removal as instructed (generally 1 week for the face, 2 weeks for the body)  4  Take Acetaminophen (Tylenol) for discomfort, if no contraindications  Do NOT take Ibuprofen or aspirin unless specifically told to do so by your Dermatologist because these medications can make bleeding worse  5  Call our office immediately for signs of infection: fever, chills, increased redness, warmth, tenderness, discomfort/pain, or pus or foul smell coming from the wound  If a small amount of bleeding is noticed, place a clean cloth over the area and apply firm pressure for ten minutes  Check the wound ONLY after 10 minutes of direct pressure; do not cheat and sneak a peak, as that does not count  If bleeding persists after 10 minutes of legitimate direct pressure, then try one more round of direct pressure for an additional 10 minutes to the area  Should the bleeding become heavier or not stop after the second attempt, call [facility] directly at [phone] (SKIN) or, if after hours, go to your local Emergency Room/Emergency Department

## 2021-04-20 NOTE — PROGRESS NOTES
[facility] Note     Patient Name: Hawk Pozo  Encounter Date: 4/20/2021     Have you been cared for by a [facility] Dermatologist in the last 3 years and, if so, which one? No    · Have you traveled outside of the [address] [place] [address] in the past 3 months or outside of the [facility] area in the last 2 weeks? No     May we call your Preferred Phone number to discuss your specific medical information? Yes     May we leave a detailed message that includes your specific medical information? Yes      Today's Chief Concerns:   Concern #1:  Lesion of concern    Past Medical History:  Have you personally ever had or currently have any of the following? · Skin cancer (such as Melanoma, Basal Cell Carcinoma, Squamous Cell Carcinoma? (If Yes, please provide more detail)- No  · Eczema: No  · Psoriasis: No  · HIV/AIDS: No  · Hepatitis B or C: No  · Tuberculosis: No  · Systemic Immunosuppression such as Diabetes, Biologic or Immunotherapy, Chemotherapy, Organ Transplantation, Bone Marrow Transplantation (If YES, please provide more detail): YES, diabetes mellitus  · Radiation Treatment (If YES, please provide more detail): YES, breast  · Any other major medical conditions/concerns? (If Yes, which types)- YES, breast cancer    Social History:     What is/was your primary occupation? Retired      What are your hobbies/past-times? Denies     Family History:  Have any of your "first degree relatives" (parent, brother, sister, or child) had any of the following       · Skin cancer such as Melanoma or Merkel Cell Carcinoma or Pancreatic Cancer? YES, brother: skin cancer  · Eczema, Asthma, Hay Fever or Seasonal Allergies: No  · Psoriasis or Psoriatic Arthritis: No  · Do any other medical conditions seem to run in your family? If Yes, what condition and which relatives?   No    Current Medications:         Current Outpatient Medications:     acetaminophen (TYLENOL 8 HOUR) 650 mg CR tablet, Take 1 tablet (650 mg total) by mouth every 8 (eight) hours, Disp: 15 tablet, Rfl: 0    amLODIPine (NORVASC) 10 mg tablet, Take 10 mg by mouth daily  , Disp: , Rfl:     calcium citrate-vitamin D (CITRACAL+D) 315-200 MG-UNIT per tablet, Take 1 tablet by mouth daily  , Disp: , Rfl:     furosemide (LASIX) 40 mg tablet, Take 40 mg by mouth daily  , Disp: , Rfl:     Lancets (OneTouch Delica Plus [id number]) MISC, TEST BLOOD GLUCOSE DAILY, Disp: , Rfl:     losartan (COZAAR) 100 MG tablet, Take 100 mg by mouth daily  , Disp: , Rfl:     metFORMIN (GLUCOPHAGE) 1000 MG tablet, Take 1,000 mg by mouth 2 (two) times a day with meals  , Disp: , Rfl:     Multiple Vitamin (MULTIVITAMIN) tablet, Take 1 tablet by mouth daily  , Disp: , Rfl:     OneTouch Verio test strip, use 1 TEST STRIP to TEST BLOOD SUGAR once daily, Disp: , Rfl:     pantoprazole (PROTONIX) 40 mg tablet, Take 1 tablet (40 mg total) by mouth daily in the early morning, Disp: 30 tablet, Rfl: 0    SYNTHROID 88 MCG tablet, , Disp: , Rfl:     lidocaine (LIDODERM) 5 %, Apply 1 patch topically daily for 15 days Remove & Discard patch within 12 hours or as directed by MD, Disp: 15 patch, Rfl: 0      Review of Systems:  Have you recently had or currently have any of the following? If YES, what are you doing for the problem? · Fever, chills or unintended weight loss: No  · Sudden loss or change in your vision: No  · Nausea, vomiting or blood in your stool: No  · Painful or swollen joints: YES, rotator cuff injury  · Wheezing or cough: No  · Changing mole or non-healing wound: YES, lesion of concern  · Nosebleeds: No  · Excessive sweating: No  · Easy or prolonged bleeding? No  · Over the last 2 weeks, how often have you been bothered by the following problems?   · Taking little interest or pleasure in doing things: 1 - Not at All  · Feeling down, depressed, or hopeless: 1 - Not at All  · Rapid heartbeat with epinephrine:  No    · FEMALES ONLY:    · Are you pregnant or planning to become pregnant? No  · Are you currently or planning to be nursing or breast feeding? No    · Any known allergies? Allergies   Allergen Reactions    Sulfa Antibiotics Rash    Penicillins Rash   ·       Physical Exam:     Was a chaperone (Derm Clinical Assistant) present throughout the entire Physical Exam? Yes     Did the Dermatology Team specifically  the patient on the importance of a Full Skin Exam to be sure that nothing is missed clinically? Yes}  o Did the patient ultimately request or accept a Full Skin Exam?  NO  o Did the patient specifically refuse to have the areas "under-the-bra" examined by the Dermatologist? Bela Villalta  o Did the patient specifically refuse to have the areas "under-the-underwear" examined by the Dermatologist? YES    CONSTITUTIONAL:   Vitals:    04/20/21 1633   Temp: (!) 97 4 °F (36 3 °C)   TempSrc: Tympanic   Weight: 102 kg (225 lb)   Height: 5' 2" (1 575 m)       PSYCH: Normal mood and affect  EYES: Normal conjunctiva  ENT: Normal lips and oral mucosa  CARDIOVASCULAR: No edema  RESPIRATORY: Normal respirations  HEME/LYMPH/IMMUNO:  No regional lymphadenopathy except as noted below in "ASSESSMENT AND PLAN BY DIAGNOSIS"    SKIN:  FULL ORGAN SYSTEM EXAM   Left Arm Normal except as noted below in Assessment        Assessment and Plan by Diagnosis:    History of Present Condition:     Duration:  How long has this been an issue for you?    o  3 weeks   Location Affected:  Where on the body is this affecting you?    o  left upper arm   Quality:  Is there any bleeding, pain, itch, burning/irritation, or redness associated with the skin lesion? o  tender to the touch   Severity:  Describe any bleeding, pain, itch, burning/irritation, or redness on a scale of 1 to 10 (with 10 being the worst)  o  4   Timing:  Does this condition seem to be there pretty constantly or do you notice it more at specific times throughout the day?     o  constant   Context: Have you ever noticed that this condition seems to be associated with specific activities you do?    o  denies   Modifying Factors:    o Anything that seems to make the condition worse?    -  denies  o What have you tried to do to make the condition better?    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 pink papule with keratotic core; keratoacanthoma    Pertinent Positives:   Pertinent Negatives: Additional History of Present Condition:  Patient states she noticed a lesion on her left arm after an injury, about 3 weeks ago  Patient reports tender to the touch  No previous treatment  Assessment and Plan:   I have discussed with the patient that a sample of skin via a "skin biopsy would be potentially helpful to further make a specific diagnosis under the microscope   Based on a thorough discussion of this condition and the management approach to it (including a comprehensive discussion of the known risks, side effects and potential benefits of treatment), the patient (family) agrees to implement the following specific plan:    o Procedure:  Skin Biopsy  After a thorough discussion of treatment options and risk/benefits/alternatives (including but not limited to local pain, scarring, dyspigmentation, blistering, possible superinfection, and inability to confirm a diagnosis via histopathology), verbal and written consent were obtained and portion of the rash was biopsied for tissue sample  See below for consent that was obtained from patient and subsequent Procedure Note      PROCEDURE SHAVE BIOPSY NOTE:     Performing Physician: Tiny Sánchez    Anatomic Location; Clinical Description with size (cm); Pre-Op Diagnosis:   o Left upper arm; 0 6 cm pink papule with keratotic core; keratoacanthoma    Post-op diagnosis: Same      Local anesthesia: 1% xylocaine with epi       Topical anesthesia: None     Hemostasis: Aluminum chloride       After obtaining informed consent  at which time there was a discussion about the purpose of biopsy  and low risks of infection and bleeding  The area was prepped and draped in the usual fashion  Anesthesia was obtained with 1% lidocaine with epinephrine  A shave biopsy to an appropriate sampling depth was obtained with a sterile blade (such as a 15-blade or DermaBlade)  The resulting wound was covered with surgical ointment and bandaged appropriately  The patient tolerated the procedure well without complications and was without signs of functional compromise  Specimen has been sent for review by Dermatopathology  Standard post-procedure care has been explained and has been included in written form within the patient's copy of Informed Consent  INFORMED CONSENT DISCUSSION AND POST-OPERATIVE INSTRUCTIONS FOR PATIENT    I   RATIONALE FOR PROCEDURE  I understand that a skin biopsy allows the Dermatologist to test a lesion or rash under the microscope to obtain a diagnosis  It usually involves numbing the area with numbing medication and removing a small piece of skin; sometimes the area will be closed with sutures  In this specific procedure, sutures are not usually needed  If any sutures are placed, then they are usually need to be removed in 2 weeks or less  I understand that my Dermatologist recommends that a skin "shave" biopsy be performed today  A local anesthetic, similar to the kind that a dentist uses when filling a cavity, will be injected with a very small needle into the skin area to be sampled  The injected skin and tissue underneath "will go to sleep and become numb so no pain should be felt afterwards    An instrument shaped like a tiny "razor blade" (shave biopsy instrument) will be used to cut a small piece of tissue and skin from the area so that a sample of tissue can be taken and examined more closely under the microscope  A slight amount of bleeding will occur, but it will be stopped with direct pressure and a pressure bandage and any other appropriate methods  I understands that a scar will form where the wound was created  Surgical ointment will be applied to help protect the wound  Sutures are not usually needed  II   RISKS AND POTENTIAL COMPLICATIONS   I understand the risks and potential complications of a skin biopsy include but are not limited to the following:   Bleeding   Infection   Pain   Scar/keloid   Skin discoloration   Incomplete Removal   Recurrence   Nerve Damage/Numbness/Loss of Function   Allergic Reaction to Anesthesia   Biopsies are diagnostic procedures and based on findings additional treatment or evaluation may be required   Loss or destruction of specimen resulting in no additional findings    My Dermatologist has explained to me the nature of the condition, the nature of the procedure, and the benefits to be reasonably expected compared with alternative approaches  My Dermatologist has discussed the likelihood of major risks or complications of this procedure including the specific risks listed above, such as bleeding, infection, and scarring/keloid  I understand that a scar is expected after this procedure  I understand that my physician cannot predict if the scar will form a "keloid," which extends beyond the borders of the wound that is created  A keloid is a thick, painful, and bumpy scar  A keloid can be difficult to treat, as it does not always respond well to therapy, which includes injecting cortisone directly into the keloid every few weeks  While this usually reduces the pain and size of the scar, it does not eliminate it  I understand that photographs may be taken before and after the procedure    These will be maintained as part of the medical providers confidential records and may not be made available to me  I further authorize the medical provider to use the photographs for teaching purposes or to illustrate scientific papers, books, or lectures if in his/her judgment, medical research, education, or science may benefit from its use  I have had an opportunity to fully inquire about the risks and benefits of this procedure and its alternatives  I have been given ample time and opportunity to ask questions and to seek a second opinion if I wished to do so  I acknowledge that there have specifically been no guarantees as to the cosmetic results from the procedure  I am aware that with any procedure there is always the possibility of an unexpected complication  III  POST-PROCEDURAL CARE (WHAT YOU WILL NEED TO DO "AFTER THE BIOPSY" TO OPTIMIZE HEALING)     Keep the area clean and dry  Try NOT to remove the bandage or get it wet for the first 24 hours   Gently clean the area and apply surgical ointment (such as Vaseline petrolatum ointment, which is available "over the counter" and not a prescription) to the biopsy site for up to 2 weeks straight  This acts to protect the wound from the outside world   Sutures are not usually placed in this procedure  If any sutures were placed, return for suture removal as instructed (generally 1 week for the face, 2 weeks for the body)   Take Acetaminophen (Tylenol) for discomfort, if no contraindications  Ibuprofen or aspirin could make bleeding worse   Call our office immediately for signs of infection: fever, chills, increased redness, warmth, tenderness, discomfort/pain, or pus or foul smell coming from the wound  WHAT TO DO IF THERE IS ANY BLEEDING? If a small amount of bleeding is noticed, place a clean cloth over the area and apply firm pressure for ten minutes  Check the wound after 10 minutes of direct pressure    If bleeding persists, try one more time for an additional 10 minutes of direct pressure on the area  If the bleeding becomes heavier or does not stop after the second attempt, or if you have any other questions about this procedure, then please call your [facility] by calling [phone] (SKIN)  I hereby acknowledge that I have reviewed and verified the site with my Dermatologist and have requested and authorized my Dermatologist to proceed with the procedure      Scribe Attestation    I,:  Johnice Mcardle am acting as a scribe while in the presence of the attending physician :       I,:  Osbaldo Callahan MD personally performed the services described in this documentation    as scribed in my presence :

## 2021-04-30 ENCOUNTER — OFFICE VISIT (OUTPATIENT)
Dept: OBGYN CLINIC | Facility: CLINIC | Age: 81
End: 2021-04-30
Payer: MEDICARE

## 2021-04-30 VITALS — BODY MASS INDEX: 41.41 KG/M2 | WEIGHT: 225 LBS | HEIGHT: 62 IN

## 2021-04-30 DIAGNOSIS — S46.011D TRAUMATIC COMPLETE TEAR OF RIGHT ROTATOR CUFF, SUBSEQUENT ENCOUNTER: Primary | ICD-10-CM

## 2021-04-30 DIAGNOSIS — S46.812A TRAUMATIC TEAR OF SUPRASPINATUS TENDON OF LEFT SHOULDER, INITIAL ENCOUNTER: ICD-10-CM

## 2021-04-30 PROBLEM — S46.011A TRAUMATIC COMPLETE TEAR OF RIGHT ROTATOR CUFF: Status: ACTIVE | Noted: 2021-04-30

## 2021-04-30 PROCEDURE — 99214 OFFICE O/P EST MOD 30 MIN: CPT | Performed by: ORTHOPAEDIC SURGERY

## 2021-04-30 NOTE — PROGRESS NOTES
Assessment/Plan:  1  Traumatic complete tear of right rotator cuff, subsequent encounter  Ambulatory referral to Orthopedic Surgery   2  Traumatic tear of supraspinatus tendon of left shoulder, initial encounter       Patient Active Problem List   Diagnosis    Personal history of breast cancer    Screening mammogram, encounter for    S/P trigger finger release    Intrinsic muscle tightness    Encounter for follow-up surveillance of breast cancer    Arthritis    Diabetes mellitus ([facility] [address] )    GERD (gastroesophageal reflux disease)    Disease of thyroid gland    Hypertension    Postural dizziness with near syncope    Fall    Hypokalemia    Breast cancer (HCC)    Transaminitis    Obstructive sleep apnea    Right shoulder pain    Traumatic complete tear of right rotator cuff    Traumatic tear of supraspinatus tendon of left shoulder       Discussion/Summary:    79 y o  female With traumatic tear of the right supraspinatus and infraspinatus with retraction to the level glenohumeral joint and  Muscular atrophy of these respective tendons  Given the retraction atrophy and the patient's age, rotator cuff repair site likely be successful  Will refer the patient to Dr Aleida Grant  For further evaluation consideration of surgical invention such as possibly reverse total shoulder arthroplasty  Will also ask him to look at the left shoulder as well to  Evaluate if this tear is repairable or not  She may continue taking  Tylenol for pain relief  In the meantime  The patient was seen and examined by Dr Samantha Cason and myself  The assessment and plan were formulated by Dr Samantha Cason and I assisted in carrying it out  Subjective:   Patient ID: Corazon Posada is a 79 y o  female   HPI    Patient presents to the office complaining of bilateral shoulder pain, right worse than left  Symptoms began in march for the left shoulder after a fall, then on April 1st 2021 she fell and injured the right shoulder   Pain is located anterior and lateral shoulders primarily, not as much on the left  Pain is described as sharp, intermittent  The pain does not radiate past the elbow  The pain is 10/10 at worst, 0/10 at best  It is made worse by abduction and overhead motion  It is made better by rest  So far the patient has tried tylenol for pain  The patient denies past episodes similar to this  The patient denies any numbness or tingling  The following portions of the patient's history were reviewed and updated as appropriate: allergies, current medications, past family history, past social history, past surgical history and problem list     Social History     Socioeconomic History    Marital status: /Civil Union     Spouse name: Not on file    Number of children: Not on file    Years of education: Not on file    Highest education level: Not on file   Occupational History    Not on file   Social Needs    Financial resource strain: Not on file    Food insecurity     Worry: Not on file     Inability: Not on file   [employer] needs     Medical: Not on file     Non-medical: Not on file   Tobacco Use    Smoking status: Never Smoker    Smokeless tobacco: Never Used   Substance and Sexual Activity    Alcohol use:  Yes    Drug use: No    Sexual activity: Not on file   Lifestyle    Physical activity     Days per week: Not on file     Minutes per session: Not on file    Stress: Not on file   Relationships    Social connections     Talks on phone: Not on file     Gets together: Not on file     Attends Worship service: Not on file     Active member of club or organization: Not on file     Attends meetings of clubs or organizations: Not on file     Relationship status: Not on file    Intimate partner violence     Fear of current or ex partner: Not on file     Emotionally abused: Not on file     Physically abused: Not on file     Forced sexual activity: Not on file   Other Topics Concern    Not on file   Social History Narrative    Not on file     Past Medical History:   Diagnosis Date    Arthritis     Cancer ([facility] [address] )     breast cancer    Diabetes mellitus ([facility] [address] )     Disease of thyroid gland     GERD (gastroesophageal reflux disease)     Headache     Hemorrhoids     Hx of radiation therapy 03/09/2012    PBRT    Hypertension     Hypertension     Night sweats     Rheumatoid arthritis (HCC)     Rotator cuff injury     Trigger finger     right and left hand    Urinary incontinence     Use of anastrozole (Arimidex)     Use of letrozole (Femara)      Past Surgical History:   Procedure Laterality Date    ABDOMINAL SURGERY      APPENDECTOMY      [facility]SY Left 01/162012    [facility]    BREAST LUMPECTOMY Left 02/17/2012    BREAST SURGERY      CATARACT EXTRACTION      CHOLECYSTECTOMY      FOOT SURGERY      right and left foot    HYSTERECTOMY      JOINT REPLACEMENT      KNEE SURGERY      [place] INCISE FINGER TENDON SHEATH Right 8/7/2018    Procedure: RELEASE TRIGGER FINGER - LONG FINGER AND RING FINGER;  Surgeon: Rosa Ureña MD;  [facility];  Service: Orthopedics    [place] INCISE FINGER TENDON SHEATH Left 8/14/2018    Procedure: RELEASE TRIGGER FINGER - LEFT INDEX FINGER;  Surgeon: Rosa Ureña MD;  [facility];  Service: Orthopedics    REPAIR RECTOCELE      TONSILLECTOMY      TUBAL LIGATION       Allergies   Allergen Reactions    Sulfa Antibiotics Rash    Penicillins Rash     Current Outpatient Medications on File Prior to Visit   Medication Sig Dispense Refill    acetaminophen (TYLENOL 8 HOUR) 650 mg CR tablet Take 1 tablet (650 mg total) by mouth every 8 (eight) hours 15 tablet 0    amLODIPine (NORVASC) 10 mg tablet Take 10 mg by mouth daily   calcium citrate-vitamin D (CITRACAL+D) 315-200 MG-UNIT per tablet Take 1 tablet by mouth daily   furosemide (LASIX) 40 mg tablet Take 40 mg by mouth daily        Lancets (OneTouch Delica Plus [id number]) MISC TEST BLOOD GLUCOSE DAILY      lidocaine (LIDODERM) 5 % Apply 1 patch topically daily for 15 days Remove & Discard patch within 12 hours or as directed by MD 15 patch 0    losartan (COZAAR) 100 MG tablet Take 100 mg by mouth daily   metFORMIN (GLUCOPHAGE) 1000 MG tablet Take 1,000 mg by mouth 2 (two) times a day with meals   Multiple Vitamin (MULTIVITAMIN) tablet Take 1 tablet by mouth daily   OneTouch Verio test strip use 1 TEST STRIP to TEST BLOOD SUGAR once daily      pantoprazole (PROTONIX) 40 mg tablet Take 1 tablet (40 mg total) by mouth daily in the early morning 30 tablet 0    SYNTHROID 88 MCG tablet        No current facility-administered medications on file prior to visit  Review of Systems   Constitutional: Negative for chills, fever and unexpected weight change  HENT: Negative for hearing loss, nosebleeds and sore throat  Eyes: Negative for pain, redness and visual disturbance  Respiratory: Negative for cough, shortness of breath and wheezing  Cardiovascular: Positive for leg swelling  Negative for chest pain and palpitations  Gastrointestinal: Negative for abdominal pain, nausea and vomiting  Endocrine: Negative for polydipsia and polyuria  Genitourinary: Negative for dysuria and hematuria  Musculoskeletal:         As noted in HPI   Skin: Positive for wound  Negative for rash  Neurological: Positive for dizziness  Negative for numbness and headaches  Psychiatric/Behavioral: Negative for decreased concentration, dysphoric mood and suicidal ideas  The patient is not nervous/anxious  Objective: There were no vitals filed for this visit  Physical Exam  Constitutional:       Appearance: She is well-developed  HENT:      Head: Normocephalic and atraumatic  Eyes:      General: No scleral icterus  Conjunctiva/sclera: Conjunctivae normal    Neck:      Musculoskeletal: Neck supple     Cardiovascular:      Comments: No discernible arrhthymias  Pulmonary: Effort: Pulmonary effort is normal  No respiratory distress  Breath sounds: No stridor  Abdominal:      General: There is no distension  Palpations: Abdomen is soft  Skin:     General: Skin is warm and dry  Findings: No erythema  Neurological:      Mental Status: She is alert and oriented to person, place, and time  Psychiatric:         Behavior: Behavior normal          Right Shoulder Exam     Tenderness   The patient is experiencing tenderness in the acromioclavicular joint  Range of Motion   Active abduction:  90 abnormal   Passive abduction:  120 abnormal   External rotation: normal   Forward flexion:  160 abnormal   Internal rotation 0 degrees: abnormal   Internal rotation 90 degrees:  70 abnormal     Muscle Strength   Abduction: 4/5   Internal rotation: 5/5   External rotation: 3/5   Supraspinatus: 4/5   Subscapularis: 5/5   Biceps: 5/5     Tests   Drop arm: negative    Other   Erythema: absent  Scars: absent  Sensation: normal  Pulse: present      Left Shoulder Exam     Tenderness   The patient is experiencing tenderness in the acromioclavicular joint  Muscle Strength   Abduction: 5/5   Internal rotation: 5/5   External rotation: 5/5   Supraspinatus: 5/5   Subscapularis: 5/5   Biceps: 5/5     Tests   Drop arm: negative    Other   Erythema: absent  Scars: absent  Sensation: normal  Pulse: present             I have personally reviewed pertinent films in PACS and my interpretation is  MRI of the right shoulder from 04/12/2021 was reviewed demonstrates massive full-thickness tears of super and infraspinatus tendons retracted to the level glenohumeral joint with mild to moderate fatty atrophy of the supraspinatus mild of the infraspinatus  There is severe AC joint arthritis  Mild biceps tendinitis    MRI left shoulder from the same date reveals full-thickness tear of the supraspinatus tendon retracted just proximal to the humeral head,  there is severe osteoarthritis of the [facility] joint as well mild fatty atrophy of the supraspinatus  Procedures  No Procedures performed today    Portions of the record may have been created with voice recognition software  Occasional wrong word or "sound a like" substitutions may have occurred due to the inherent limitations of voice recognition software  Read the chart carefully and recognize, using context, where substitutions have occurred

## 2021-05-19 ENCOUNTER — CONSULT (OUTPATIENT)
Dept: OBGYN CLINIC | Facility: OTHER | Age: 81
End: 2021-05-19
Payer: MEDICARE

## 2021-05-19 VITALS
DIASTOLIC BLOOD PRESSURE: 109 MMHG | WEIGHT: 224 LBS | HEART RATE: 82 BPM | SYSTOLIC BLOOD PRESSURE: 145 MMHG | BODY MASS INDEX: 40.97 KG/M2

## 2021-05-19 DIAGNOSIS — S46.012A TRAUMATIC COMPLETE TEAR OF LEFT ROTATOR CUFF, INITIAL ENCOUNTER: Primary | ICD-10-CM

## 2021-05-19 DIAGNOSIS — S46.011D TRAUMATIC COMPLETE TEAR OF RIGHT ROTATOR CUFF, SUBSEQUENT ENCOUNTER: ICD-10-CM

## 2021-05-19 PROCEDURE — 99214 OFFICE O/P EST MOD 30 MIN: CPT | Performed by: ORTHOPAEDIC SURGERY

## 2021-05-19 NOTE — PROGRESS NOTES
Orthopaedic Surgery - Office Note  Nirali Villeda (68 y o  female)   : 1940   MRN: [id number]  Encounter Date: 2021    Chief Complaint   Patient presents with    Right Shoulder - Pain    Left Shoulder - Pain       Assessment / Plan  bilateral traumatic rotator cuff tears    · Activity as tolerated  · Home exercise program reviewed  · Begin outpatient PT for Bilateral traumatic rotator cuff tears  Focus on range of motion and strengthening  1-3 times a week   For 8 weeks  · Tylenol prn pain  · Ice, heat and Voltaren gel for pain control  · If she would like to have a CSI she may call our office to return early for CSI  · After the patient completes formal physical therapy we will consider surgical intervention at that time  We did discuss at today's appointment that a refer his shoulder arthroplasty may be the best option for at this time,  but we will consider a rotator cuff repair  Return in about 6 weeks (around 2021)  History of Present Illness  Nirali Villeda is a 79 y  o  Right-hand-dominant female who presents for initial evaluation of bilateral shoulders, she was sent here today as a consultation by Dr Carreno Primer  She states at today's appointment her right shoulder is worse than her left  On 2021 she fell on her right shoulder, she states is unclear how she fell at that time  She has 0 pain at rest however with any movement she has increased pain of 5  Or 6/10  She does experience increased pain at night  She does use Tylenol which provides her with moderate relief of her symptoms  She has had no improvement with her pain or function since her injury  She has had no treatment for her right shoulder pain  She denies any further injury or trauma to her right shoulder  She denies any distal paresthesias  She states on 2021 she fell backwards and injured her left shoulder    She has had no treatment for her left shoulder and is experienced no improvement with her pain or function since her injury  She does experience 0 pain at rest however with movement she does experience a 5 or 6/10  She does get relief of her symptoms with Tylenol  She does also experience increased pain at night  She does have MRIs for bilateral shoulders demonstrating rotator cuff tears  She denies any further injury or trauma to her  Left shoulder  She denies any distal paresthesias  Review of Systems  Pertinent items are noted in HPI  All other systems were reviewed and are negative  Physical Exam  BP (!) 145/109 (BP Location: Right arm, Patient Position: Sitting, Cuff Size: Adult) Comment: patient did not take bp med yet this am  Pulse 82   Wt 102 kg (224 lb)   LMP  (LMP Unknown)   BMI 40 97 kg/m²   Cons: Appears well  No apparent distress  Psych: Alert  Oriented x3  Mood and affect normal   Eyes: PERRLA, EOMI  Resp: Normal effort  No audible wheezing or stridor  CV: Palpable pulse  No discernable arrhythmia  3+ pitting LE edema  Lymph:  No palpable cervical, axillary, or inguinal lymphadenopathy  Skin: Warm  No palpable masses  No visible lesions  Neuro: Normal muscle tone  Normal and symmetric DTR's  Bilateral Shoulder Exam  Alignment / Posture:  Normal shoulder posture  Inspection:  No swelling  No edema  No erythema  No ecchymosis  Palpation:  Subacromial bursa tenderness  No effusion  No warmth  No crepitus  ROM:  Shoulder FE active 90 passive 130  Shoulder ER left 60 right 40   Shoulder IR left T8  right T10  Strength:  Supraspinatus right 4- left 4+  /5  Infraspinatus right 4-  left 4+  /5  Subscapularis 5/5  Stability:  No objective shoulder instability  Tests: (+) Summers  (-) Belly press  Neurovascular:  Sensation intact in Ax/R/M/U nerve distributions  2+ radial pulse      Studies Reviewed  XR of right shoulder - i personally reviewed the xray obtained on 4/1/21 which demonstrates  No dislocation, fracture or other obvious osseous abnormalities  XR of left shoulder -  I personally reviewed x-rays obtained on March 23, 2021 which demonstrates no dislocation, fracture or other obvious osseous abnormalities  MRI of right shoulder -   I personally reviewed the MRI obtained on April 12, 2021  Which demonstrates complete rotator cuff tear with minimal muscle atrophy  MRI of left shoulder-  I personally reviewed the MRI obtained on April 12, 2021  Which demonstrates complete rotator cuff tear with minimal muscle atrophy    Procedures  No procedures today  Medical, Surgical, Family, and Social History  The patient's medical history, family history, and social history, were reviewed and updated as appropriate      Past Medical History:   Diagnosis Date    Arthritis     Cancer [facility])     breast cancer    Diabetes mellitus ([facility] [address] )     Disease of thyroid gland     GERD (gastroesophageal reflux disease)     Headache     Hemorrhoids     Hx of radiation therapy 03/09/2012    PBRT    Hypertension     Hypertension     Night sweats     Rheumatoid arthritis (HCC)     Rotator cuff injury     Trigger finger     right and left hand    Urinary incontinence     Use of anastrozole (Arimidex)     Use of letrozole (Femara)        Past Surgical History:   Procedure Laterality Date    ABDOMINAL SURGERY      APPENDECTOMY      [facility]/162012    [facility]    BREAST LUMPECTOMY Left 02/17/2012    BREAST SURGERY      CATARACT EXTRACTION      CHOLECYSTECTOMY      FOOT SURGERY      right and left foot    HYSTERECTOMY      JOINT REPLACEMENT      KNEE SURGERY      [place] INCISE FINGER TENDON SHEATH Right 8/7/2018    Procedure: RELEASE TRIGGER FINGER - LONG FINGER AND RING FINGER;  Surgeon: Aurea Pearl MD;  [facility];  Service: Orthopedics    [place] INCISE FINGER TENDON SHEATH Left 8/14/2018    Procedure: RELEASE TRIGGER FINGER - LEFT INDEX FINGER;  Surgeon: Aurea Pearl MD;  [facility];  Service: Orthopedics    REPAIR RECTOCELE      TONSILLECTOMY      TUBAL LIGATION         Family History   Problem Relation Age of Onset    Prostate cancer Father         age at dx unk    Prostate cancer Brother 68    Thyroid cancer Brother         age at dx unk    Skin cancer Brother     Thyroid cancer Daughter 39        age at dx unk    Breast cancer Maternal Aunt 71        age at dx unk    Lung cancer Maternal Uncle         age at dx unk    No Known Problems Son     No Known Problems Son     No Known Problems Son     Pancreatic cancer Maternal Aunt         unknown age       Social History     Occupational History    Not on file   Tobacco Use    Smoking status: Never Smoker    Smokeless tobacco: Never Used   Substance and Sexual Activity    Alcohol use: Yes    Drug use: No    Sexual activity: Not on file       Allergies   Allergen Reactions    Sulfa Antibiotics Rash    Penicillins Rash         Current Outpatient Medications:     acetaminophen (TYLENOL 8 HOUR) 650 mg CR tablet, Take 1 tablet (650 mg total) by mouth every 8 (eight) hours, Disp: 15 tablet, Rfl: 0    amLODIPine (NORVASC) 10 mg tablet, Take 10 mg by mouth daily  , Disp: , Rfl:     calcium citrate-vitamin D (CITRACAL+D) 315-200 MG-UNIT per tablet, Take 1 tablet by mouth daily  , Disp: , Rfl:     furosemide (LASIX) 40 mg tablet, Take 40 mg by mouth daily  , Disp: , Rfl:     Lancets (OneTouch Delica Plus [id number]) MISC, TEST BLOOD GLUCOSE DAILY, Disp: , Rfl:     losartan (COZAAR) 100 MG tablet, Take 100 mg by mouth daily  , Disp: , Rfl:     metFORMIN (GLUCOPHAGE) 1000 MG tablet, Take 1,000 mg by mouth 2 (two) times a day with meals  , Disp: , Rfl:     Multiple Vitamin (MULTIVITAMIN) tablet, Take 1 tablet by mouth daily  , Disp: , Rfl:     OneTouch Verio test strip, use 1 TEST STRIP to TEST BLOOD SUGAR once daily, Disp: , Rfl:     SYNTHROID 88 MCG tablet, , Disp: , Rfl:     lidocaine (LIDODERM) 5 %, Apply 1 patch topically daily for 15 days Remove & Discard patch within 12 hours or as directed by MD, Disp: 15 patch, Rfl: 0    pantoprazole (PROTONIX) 40 mg tablet, Take 1 tablet (40 mg total) by mouth daily in the early morning, Disp: 30 tablet, Rfl: 0      Jalyn Palangio    Scribe Attestation    I,:  Caryle Chambers am acting as a scribe while in the presence of the attending physician :       I,:  Fabby Lopes MD personally performed the services described in this documentation    as scribed in my presence :

## 2021-05-20 ENCOUNTER — TELEPHONE (OUTPATIENT)
Dept: DERMATOLOGY | Facility: CLINIC | Age: 81
End: 2021-05-20

## 2021-05-20 NOTE — TELEPHONE ENCOUNTER
Pt wanted to know if she can have the lesion on her arm removed and if so scheduled? Can you confirm, Is this a regular visit or MOHS?

## 2021-05-24 ENCOUNTER — EVALUATION (OUTPATIENT)
Dept: PHYSICAL THERAPY | Facility: CLINIC | Age: 81
End: 2021-05-24
Payer: MEDICARE

## 2021-05-24 DIAGNOSIS — S46.011D TRAUMATIC COMPLETE TEAR OF RIGHT ROTATOR CUFF, SUBSEQUENT ENCOUNTER: ICD-10-CM

## 2021-05-24 DIAGNOSIS — S46.012A TRAUMATIC COMPLETE TEAR OF LEFT ROTATOR CUFF, INITIAL ENCOUNTER: ICD-10-CM

## 2021-05-24 PROCEDURE — 97110 THERAPEUTIC EXERCISES: CPT | Performed by: PHYSICAL THERAPIST

## 2021-05-24 PROCEDURE — 97162 PT EVAL MOD COMPLEX 30 MIN: CPT | Performed by: PHYSICAL THERAPIST

## 2021-05-24 NOTE — TELEPHONE ENCOUNTER
I believe it was just an excision that is needed, not MOHS  Procedure slot for excision  Dr Amber Razo, is that correct?

## 2021-05-24 NOTE — PROGRESS NOTES
PT Evaluation     Today's date: 2021  Patient name: Alda Navarro  : 1940  MRN: [id number]  Referring provider: Antonietta Grewal MD  Dx:   Encounter Diagnosis     ICD-10-CM    1  Traumatic complete tear of right rotator cuff, subsequent encounter  S46 011D Ambulatory referral to Physical Therapy   2  Traumatic complete tear of left rotator cuff, initial encounter  S46 012A Ambulatory referral to Physical Therapy       Start Time:   Stop Time:   Total time in clinic (min): 55 minutes    Assessment  Assessment details: Alda Navarro is a pleasant 79 y o  female who presents with significant pain and limitation in both shoulders secondary to bilateral rotator cuff tears  The patient's greatest concerns are the pain she is experiencing, wanting to avoid surgery, fear of not being able to keep active and future ill health (and wanting to prevent it)  No further referral appears necessary at this time based upon examination results  Primary movement impairment diagnosis of bilateral subacromial pain resulting in pathoanatomical symptoms of bilateral rotator cuff tears and limiting her ability to care for self, carry, drive, eat, exercise or recreation, lift, perform household chores, reach overhead, sleep and wash behind the back  Primary Impairments:  1) Significant mobility limitations in both shoulders secondary to significant shoulder trauma  2) Significant strength limitations in both shoulders secondary to significant shoulder trauma    Etiologic factors include a series of recent falls  Symptom irritability: moderateUnderstanding of Dx/Px/POC: good   Prognosis: good  Prognosis details: Positive prognostic indicators include positive attitude toward recovery, absence of peripheralization and absence of observed red flags  Negative prognostic indicators include hypertension, high symptom irritability, multiple concurrent orthopedic problems, obesity and diabetes diagnosis  Goals  Patient will be independent with home exercise program    Patient will be able to manage symptoms independently  Short Term Goals: to be achieved by 4 weeks  1) Patient to be independent with basic HEP  2) Decrease pain to 4/10 at its worst  3) Increase shoulder ROM by 5-10 degrees in all planes  4) Increase shoulder strength by 1/2 MMT grade in all deficient planes    Long Term Goals: to be achieved by discharge  1) FOTO equal to or greater than 59  2) Patient to be independent with comprehensive HEP  3) Patient will demonstrate maximal over head reaching  4) Increase UE strength to 5/5 MMT grade in all planes to improve a/iadls  5) Patient to report no sleep interruption secondary to pain        Plan  Patient would benefit from: skilled physical therapy  Planned modality interventions: Modalities PRN  Planned therapy interventions: activity modification, manual therapy, neuromuscular re-education, patient education, therapeutic activities, therapeutic exercise, graded activity, home exercise program, behavior modification and self care  Frequency: 2x week  Duration in weeks: 8  Treatment plan discussed with: patient        Subjective Evaluation    History of Present Illness  Mechanism of injury: History of Current Injury: The patient presents to PT following two separate falling incidents leading to tears of both the right and left rotator cuff  The first fall occurred 3/23/21 and the second fall occurred 4/1/21  The patient reports that her first injury led to minimal pain, the patient reports that she tripped in the driveway  The second fall was the result of dizziness and led to an ambulance trip to the hospital, the patient was admitted overnight  The second fall produced significantly more pain than the first injury  The patient was referred to orthopedic physician (Dr Raven Amato) for surgical consultation but was recommended to consider conservative care      Surgery date: N/A  Pain location/Descriptors: The patient reports very little pain at rest, but notes an onset of pain with active use of either shoulder  The patient reports that on her right side, she experiences pain from the anterior shoulder wrapped around to the lateral side with referred pain into the lateral elbow, posterior scapula, and anterior along the collarbone  The left shoulder experiences pain in the anterior and lateral shoulder into the lateral arm, but less severely  Aggravating factors: Active use of the arm, certain positions  Easing factors: Pain medication (tylenol), rest  24 HR pattern: The patient reports difficulty sleeping, reports that she is sleeping better in the recliner  Imaging: XR, MRI  Special Questions: The patient reports a recent worsening of a perceived dizziness (reports that the room feels like it spins) for <30 seconds after sudden movements or position changes, reports that she has experienced this before but it has progressed since her falls   The patient   Patient concerns: Improve her quality of life, less pain and better movement  Occupation: Retired (worked for [employer] for 12 years, worked in the [employer])    Social Support  Lives in: [facility]  Lives with: spouse, adult children and young children    Employment status: not working  Hand dominance: right      Diagnostic Tests  X-ray: normal  MRI studies: abnormal        Objective     Neurological Testing     Sensation     Shoulder   Left Shoulder   Intact: light touch    Right Shoulder   Intact: light touch    Active Range of Motion   Left Shoulder   Flexion: 150 degrees   Abduction: 100 degrees   External rotation BTH: C6   Internal rotation BTB: T11     Right Shoulder   Flexion: 80 degrees   Abduction: 65 degrees   External rotation BTH: C2   Internal rotation BTB: T12     Passive Range of Motion   Left Shoulder   Flexion: 161 degrees   Abduction: 122 degrees     Right Shoulder   Flexion: 87 degrees   Abduction: 120 degrees     Scapular Mobility   Left Shoulder   Scapular Mobility with Shoulder to 90° FF   Scapular elevation: moderate    Right Shoulder   Scapular Mobility with Shoulder to 90° FF   Scapular elevation: severe    Strength/Myotome Testing     Left Shoulder     Planes of Motion   Flexion: 3-   Abduction: 3-   External rotation at 0°: 3-   Internal rotation at 0°: 3-     Right Shoulder     Planes of Motion   Flexion: 3-   Abduction: 3-   External rotation at 0°: 3-   Internal rotation at 0°: 3-              Precautions: HTN, DM, hx of breast cancer      Manuals 5/24/21            Manual stretching all planes             GHJ post/inf mobs ( gr II-III) bilaterally                                       Neuro Re-Ed             Seated scapular AROM all planes             Shoulder isometrics all planes                                                                              Ther Ex             Active warmup             Table slides (scaption) X20, HEP            Scapular retractions 3 sec hold x10, HEP            Wand AAROM all planes                                                                 Ther Activity                                       Gait Training                                       Modalities                                       Assessment IE            Education Prognosis, HEP, POC

## 2021-05-27 ENCOUNTER — OFFICE VISIT (OUTPATIENT)
Dept: PHYSICAL THERAPY | Facility: CLINIC | Age: 81
End: 2021-05-27
Payer: MEDICARE

## 2021-05-27 DIAGNOSIS — S46.011D TRAUMATIC COMPLETE TEAR OF RIGHT ROTATOR CUFF, SUBSEQUENT ENCOUNTER: Primary | ICD-10-CM

## 2021-05-27 DIAGNOSIS — S46.012A TRAUMATIC COMPLETE TEAR OF LEFT ROTATOR CUFF, INITIAL ENCOUNTER: ICD-10-CM

## 2021-05-27 PROCEDURE — 97140 MANUAL THERAPY 1/> REGIONS: CPT

## 2021-05-27 PROCEDURE — 97110 THERAPEUTIC EXERCISES: CPT

## 2021-05-27 PROCEDURE — 97112 NEUROMUSCULAR REEDUCATION: CPT

## 2021-05-27 NOTE — PROGRESS NOTES
Daily Note     Today's date: 2021  Patient name: Cory Silveira  : 1940  MRN: [id number]  Referring provider: Rachel Campos MD  Dx:   Encounter Diagnosis     ICD-10-CM    1  Traumatic complete tear of right rotator cuff, subsequent encounter  S46 011D    2  Traumatic complete tear of left rotator cuff, initial encounter  S46 012A                   Subjective: Patient noted pain in B shoulders R shoulder is more painful then L shoulder  Objective: See treatment diary below      Assessment: Patient was challenged with supine cane flexion due to weakness and pain therefore patient was unable to get overhead instead performed chest press with cane  Patient needed vc for initiation of exercises and to correct form throughout treatment  Trialed cane abd in supine patient noted increased pain held off today  Add in abd and ext shoulder isometrics NV patient was able to perform other directions with no complaints today  Update HEP NV  Plan: Continue per plan of care        Precautions: HTN, DM, hx of breast cancer      Manuals 21           Manual stretching all planes  AF           GHJ post/inf mobs ( gr II-III) bilaterally                                       Neuro Re-Ed             Seated scapular AROM all planes  posterior shoulder rolls 15x           Shoulder isometrics all planes  Flex,IR,ER x 5"x10ea                                                                            Ther Ex             Active warmup  pullys 3'           Table slides (scaption) X20, HEP x20 review           Scapular retractions 3 sec hold x10, HEP 3 sec x10 HEP           Wand AAROM all planes  x10 chest press/flexion and ER                                                                Ther Activity                                       Gait Training                                       Modalities                                       Assessment IE            Education Prognosis, HEP, POC

## 2021-06-02 ENCOUNTER — OFFICE VISIT (OUTPATIENT)
Dept: PHYSICAL THERAPY | Facility: CLINIC | Age: 81
End: 2021-06-02
Payer: MEDICARE

## 2021-06-02 DIAGNOSIS — S46.011D TRAUMATIC COMPLETE TEAR OF RIGHT ROTATOR CUFF, SUBSEQUENT ENCOUNTER: Primary | ICD-10-CM

## 2021-06-02 DIAGNOSIS — S46.012A TRAUMATIC COMPLETE TEAR OF LEFT ROTATOR CUFF, INITIAL ENCOUNTER: ICD-10-CM

## 2021-06-02 PROCEDURE — 97140 MANUAL THERAPY 1/> REGIONS: CPT | Performed by: PHYSICAL THERAPIST

## 2021-06-02 PROCEDURE — 97110 THERAPEUTIC EXERCISES: CPT | Performed by: PHYSICAL THERAPIST

## 2021-06-02 NOTE — PROGRESS NOTES
Daily Note     Today's date: 2021  Patient name: Obi Le  : 1940  MRN: [id number]  Referring provider: Jacquie Maloney MD  Dx:   Encounter Diagnosis     ICD-10-CM    1  Traumatic complete tear of right rotator cuff, subsequent encounter  S46 011D    2  Traumatic complete tear of left rotator cuff, initial encounter  S46 012A        Start Time: 1441  Stop Time: 1521  Total time in clinic (min): 40 minutes    Subjective: No new complaints today  The patient reports improvement in symptoms since previous session  Objective: See treatment diary below      Assessment: The PT continued with the current training program during today's treatment  GHJ post and inf mobs were added to the patient's current program to promote improved shoulder mobility and decreased symptoms  The patient tolerated manual and active treatment well today  The patient would benefit from further skilled PT services  Plan: Continue per plan of care        Precautions: HTN, DM, hx of breast cancer      Manuals 21          Manual stretching all planes  AF SRB          GHJ post/inf mobs ( gr II-III) bilaterally   SRB                                    Neuro Re-Ed             Seated scapular AROM all planes  posterior shoulder rolls 15x           Shoulder isometrics all planes  Flex,IR,ER x 5"x10ea Flex,IR,ER x 5"x10ea, HEP                                                                           Ther Ex             Active warmup  pullys 3' Pulleys flex/scap 2 min ea          Table slides (scaption) X20, HEP x20 review HEP          Scapular retractions 3 sec hold x10, HEP 3 sec x10 HEP HEP          Wand AAROM all planes  x10 chest press/flexion and ER  x15 flex/ER                                                              Ther Activity                                       Gait Training                                       Modalities                                       Assessment IE Education Prognosis, HEP, POC

## 2021-06-03 ENCOUNTER — OFFICE VISIT (OUTPATIENT)
Dept: PHYSICAL THERAPY | Facility: CLINIC | Age: 81
End: 2021-06-03
Payer: MEDICARE

## 2021-06-03 DIAGNOSIS — S46.012A TRAUMATIC COMPLETE TEAR OF LEFT ROTATOR CUFF, INITIAL ENCOUNTER: ICD-10-CM

## 2021-06-03 DIAGNOSIS — S46.011D TRAUMATIC COMPLETE TEAR OF RIGHT ROTATOR CUFF, SUBSEQUENT ENCOUNTER: Primary | ICD-10-CM

## 2021-06-03 PROCEDURE — 97140 MANUAL THERAPY 1/> REGIONS: CPT | Performed by: PHYSICAL THERAPIST

## 2021-06-03 NOTE — PROGRESS NOTES
Daily Note     Today's date: 6/3/2021  Patient name: Haseeb Suarez  : 1940  MRN: [id number]  Referring provider: Hammad Red MD  Dx:   Encounter Diagnosis     ICD-10-CM    1  Traumatic complete tear of right rotator cuff, subsequent encounter  S46 011D    2  Traumatic complete tear of left rotator cuff, initial encounter  S46 012A        Start Time: 1417  Stop Time: 1455  Total time in clinic (min): 38 minutes    Subjective: No new complaints today  The patient reports worsening in symptoms since previous session  Objective: See treatment diary below      Assessment: The PT modified the patient's program to avoid overtraining during today's treatment  PBall rollouts 3 ways were added to the patient's current program to promote pain free ROM training  The patient tolerated manual and active treatment fair today  The patient would benefit from further skilled PT services as she continues to experience pain  Plan: Continue per plan of care        Precautions: HTN, DM, hx of breast cancer      Manuals 5/24/21 2021 6/2 6/3         Manual stretching all planes  AF SRB SRB         GHJ post/inf mobs ( gr II-III) bilaterally   SRB SRB         STM ant shoulder    SRB x15 min                      Neuro Re-Ed             Seated scapular AROM all planes  posterior shoulder rolls 15x           Shoulder isometrics all planes  Flex,IR,ER x 5"x10ea Flex,IR,ER x 5"x10ea, HEP                                                                           Ther Ex             Active warmup  pullys 3' Pulleys flex/scap 2 min ea Pulleys flex/scap 2 min ea         Table slides (scaption) X20, HEP x20 review HEP          Scapular retractions 3 sec hold x10, HEP 3 sec x10 HEP HEP          Wand AAROM all planes  x10 chest press/flexion and ER                                                                Ther Activity                                       Gait Training                                       Modalities Assessment IE            Education Prognosis, HEP, POC

## 2021-06-07 ENCOUNTER — PROCEDURE VISIT (OUTPATIENT)
Dept: DERMATOLOGY | Facility: CLINIC | Age: 81
End: 2021-06-07
Payer: MEDICARE

## 2021-06-07 VITALS — BODY MASS INDEX: 42.36 KG/M2 | WEIGHT: 230.2 LBS | HEIGHT: 62 IN | TEMPERATURE: 97.3 F

## 2021-06-07 DIAGNOSIS — C44.629 SQUAMOUS CELL CARCINOMA OF ARM, LEFT: Primary | ICD-10-CM

## 2021-06-07 PROCEDURE — 88305 TISSUE EXAM BY PATHOLOGIST: CPT | Performed by: STUDENT IN AN ORGANIZED HEALTH CARE EDUCATION/TRAINING PROGRAM

## 2021-06-07 PROCEDURE — 12032 INTMD RPR S/A/T/EXT 2.6-7.5: CPT | Performed by: DERMATOLOGY

## 2021-06-07 PROCEDURE — 11602 EXC TR-EXT MAL+MARG 1.1-2 CM: CPT | Performed by: DERMATOLOGY

## 2021-06-07 RX ORDER — DOXYCYCLINE HYCLATE 100 MG/1
100 CAPSULE ORAL 2 TIMES DAILY
COMMUNITY
Start: 2021-06-04

## 2021-06-07 NOTE — PROGRESS NOTES
PROCEDURE:  EXCISION WITH INTERMEDIATE LAYERED CLOSURE     Attending: Benjamin Douglas   Assistant: Frank Araujo    Pre-Op Diagnosis: Squamous cell carcinoma  Post-Op Diagnosis: Same   Benign versus Malignant Malignant      Lesion Anatomic Location: Left upper arm (Previous Accession Number: [id number])  Pre-op size: 0 9 cm x 1 cm  Size of defect:  1 9 cm x 2 cm (with 0 5 centimeter margins)  Final repaired wound length:  4 5 cm    Written and verbal, witnessed informed consent was obtained  I discussed that excision is a method of removing lesions both benign and malignant lesions  A portion of normal skin is often taken to ensure completeness of removal   I reviewed that procedure will include numbing the area,  cutting around and under defect, undermining tissue, and closing the wound with sutures both inside and out  These sutures are usually removed in 7 to 14 days  Risks (bleeding, pain, infection, scarring, recurrence) and benefits discussed  It was discussed with patient that every effort is made to minimize scar, but scarring is influenced also by extrinsic factor such as location, age and genetics  Time Out: performed:  yes  Correct patient: yes  Correct site per Clinic Report: yes  Correct site per Patient Report: yes    LOCAL ANESTHESIA: 1% xylocaine with epi     DESCRIPTION OF PROCEDURE: The patient was brought back into the procedure room, anesthetized locally, prepped and draped in the usual fashion  Using a #15 blade with a scalpel, the lesion was excised in elliptical fashion  The wound was  undermined in the  fascial plane  Hemostasis was achieved with light electrocoagulation  Purpose of undermining was to decrease wound tension and facilitate closure      The wound was closed with subcutaneous sutures as follows:    Deep suture:4-0 Vicryl      Epidermal edge closure was accomplished with superficial sutures as follows:    Superficial suture: 4-0 Ethilon  Superficial suture type: Running, 1 Interrupted     Estimated blood loss less than 3ml  The patient tolerated the procedure well without any complications  The wound was cleaned with sterile saline, dried off, surgical vaseline ointment was applied, and the wound was covered  A pressure dressing was applied for stabilization and light pressure  The patient was given detailed oral and written instructions on postoperative care as detailed in consent  The patient left in good medical condition  POSTOP DISCUSSION DISCUSSION AND INSTRUCTIONS FOR PATIENT      Rationale for Procedure  A skin excision allows the dermatologist to remove a lesion  The procedure involves a local numbing medication and removing the entire lesion  Typically, the lesion is being removed because it is atypical, traumatized, or for significant appearance reasons  The area will be open like a brush burn and allowed to heal    There will be no sutures  Tissue is sent to pathologist who will reconfirm diagnosis and verify completeness of lesion removal     Description of Procedure  We would like to perform a skin excision today  A local anesthetic, similar to the kind that a dentist uses when filling a cavity, will be injected with a very small needle into the skin area to be sampled  The injected skin and tissue underneath should go to sleep and become numbed so that no further pain should be felt  A scalpel will be used to cut around the lesion and tissue will be submitted to pathology for examination  Depending on the diagnosis the lesion will be excised with a certain amount of normal skin to help assure completeness of lesion removal   The physician will discuss in advance the anticipated size and extent of removal    Bleeding will occur, but it will stopped with direct pressure, sutures, and electrocautery  Surgical Vaseline-type ointment will also applied after the procedure to help create a barrier between the wound and the outside world        Risks and Potential Complications  The advantage of a skin excision is that it allows us to remove a problem lesion quickly  Although this usually permits the lesion to heal as soon as possible with the least scarring, there are some risks and potential complications that include but are not limited to the following:  - Some bleeding is normal at time of procedure and some bleeding on gauze is normal  the first few days after surgery  Profuse bleeding and bleeding with swelling and pain should be reported as detailed  below  - Infection is uncommon in skin surgery  Infection should be reported and is indicated by pain, redness, and discharge of purulent material   - Some dull to at time sharp pain could occur initially the day after surgery  Persistent pain or increasing pain days after surgery is not expected and should be reported  - Every effort is made to minimize scar, but location, size, and genetics do play a role in scar appearance  A surgical wound does not achieve its optimal appearance until 6 months  There are several treatments available if scarring would be problematic including scar creams, silicone pad, laser and scar revision   - Skin discoloration can occur especially in people of color  Its important to avoid sun on wound in first 6 months after surgery  Treatment is available if pigment is problematic   - Incomplete removal of the lesion or recurrence of lesion can occur and this would then require further treatment and more surgery   - Nerve Damage/Numbness/Loss of Function is very rare, but is most likely to occur if lesion is large or if it is in a high risk location  - Allergic Reaction to lidocaine is rare  More commonly,  epinephrine is used  with the lidocaine  Occasionally, epinephrine (aka adrenalin) may cause a brief  feeling of anxiety or jitteriness  - The person at the microscope  (pathologist) may provide additional information that was unexpected   This unexpected finding could provoke the need for additional treatment or evaluation  What You Will Need to Do After the Procedure  1  Keep the area clean and dry the first day  Try NOT to remove the bandage for the first day  2  Gently clean the area with soap and water and apply Vaseline ointment (this is over the counter and not a prescription) to the excision  site for up to 2 weeks  3  Apply a clean appropriately sized bandage to area  Gauze and paper tape are recommended for sensitive skin  4  Return for suture removal as instructed (generally 1 week for the face, 2 weeks for the body)  5  Take Acetaminophen (Tylenol) for discomfort, if no contraindications  Do NOT take Ibuprofen or aspirin unless specifically told to do so by your Dermatologist because these medications can make bleeding worse  6  Call our office immediately for signs of infection: fever, chills, increased redness, warmth, tenderness, discomfort/pain, or pus or foul smell coming from the wound  If bleeding is noticed, place a clean cloth over the area and apply firm pressure for thirty minutes  Check the wound ONLY after 30 minutes of direct pressure; do not cheat and sneak a peak, as that does not count  If bleeding persists after 30 minutes of legitimate direct pressure, then try one more round of direct pressure for an additional 10 minutes to the area  Should the bleeding become heavier or not stop after the second attempt, call [facility] directly at [phone] (SKIN) or, if after hours, go to your local Emergency Room/Emergency Department          Scribe Attestation    I,:  Odette Thayer am acting as a scribe while in the presence of the attending physician :       I,:  Kristian Estrada MD personally performed the services described in this documentation    as scribed in my presence :

## 2021-06-07 NOTE — PATIENT INSTRUCTIONS
EXCISIONAL BIOPSY    Rationale for Procedure  A skin excisional biopsy allows the dermatologist to further examine a lesion or rash under the microscope to potentially obtain a more specific diagnosis  It usually involves numbing the area with numbing medication and removing a large piece of skin  Usually, the area will be closed with sutures at the end of the procedure  Sutures usually need to be removed in 5 to 7 days of the biopsy was performed on the face or in 10 to 14 days if performed elsewhere on the body  Description of Procedure  We would like to perform a skin excisional biopsy today  A local anesthetic, similar to the kind that a dentist uses when filling a cavity, will be injected with a very small needle into the skin area to be sampled  The injected skin and tissue underneath should go to sleep and become numbed so that no further pain should be felt  An instrument shaped like a tiny "razor blade" (i e , the scalpel instrument) will be used to cut a large round piece of skin and tissue from the area so that the sample may be taken and examined more closely under the microscope  A slight amount of bleeding will occur, but it is usually stopped with direct pressure, chemical cautery, and/or a pressure bandage; electrocautery and other means of intervention may be necessary to help stop the bleeding  Sutures/stitches are usually applied to help aid in wound healing  Surgical Vaseline-type ointment will also applied after the procedure to help create a barrier between the wound and the outside world      Risks and Potential Complications  While the advantage of a skin punch biopsy is that it allows us to potentially examine the skin more closely under the microscope, there are some risks and potential complications that include but are not limited to the following:  - Bleeding  - Infection  - Pain  - Scar/keloid  - Skin discoloration  - Incomplete Removal  - Recurrence   - Wound dehiscence  - Distortion/alteration of surrounding anatomic features  - Nerve Damage/Numbness, which may be permanent  - Allergic Reaction to Anesthesia or Antibiotic     Postoperative Care:  1  Keep the area dry for 24 hours  After 24 hours, you may remove the bandage and get the site minimally wet as you wash/shower  Re-bandage of the surgical site is optional, unless directed by your medical provider  2  Apply Bacitracin ointment (or a prescribed topical antibiotic ointment) twice daily to the excision site for up to 2 weeks  3    If you were prescribed an antibiotic by mouth, please take it as directed, if no contraindications  4  Return for suture removal as instructed (generally 1 week for the face, 2 weeks for the body)  5    Call our office immediately for signs of infection: fever, chills, increased redness, warmth, tenderness, discomfort/pain, or yellow or foul smelling drainage

## 2021-06-10 NOTE — RESULT ENCOUNTER NOTE
DERMATOPATHOLOGY RESULT NOTE    Results reviewed by ordering physician  Called patient to personally discuss results  Discussed results with patient  Instructions for Clinical Derm Team:   (remember to route Result Note to appropriate staff):    None    Result & Plan by Specimen:    Specimen A: malignant  Plan: margins clear    Component   Case Report  Surgical Pathology Report                         Case: [id number]                                   Authorizing Provider: Osbaldo Callahan MD            Collected:           06/07/2021 0857              Ordering Location:     [facility] Received:            06/07/2021 0857                                     Gap                                                                          Pathologist:           Esthela Regalado MD                                                           Specimen:    Skin, Other, A: left upper arm                                                           Final Diagnosis  A  Skin, left upper arm, excision:     -Residual atypical squamous proliferation; not seen at examined inked specimen margins     -Prior procedure site changes present      -Incidental lentigo      -Findings consistent with actinic melanocytic hyperplasia (so-called "melanocytic hyperplasia of sun-damaged skin")  Electronically signed by Esthela Regalado MD on 6/9/2021 at  3:43 PM  Additional Information   All reported additional testing was performed with appropriately reactive controls   These tests were developed and their performance characteristics determined by [facility] or appropriate performing facility, though some tests may be performed on tissues which have not been validated for performance characteristics (such as staining performed on alcohol exposed cell blocks and decalcified tissues)   Results should be interpreted with caution and in the context of the patients' clinical condition   These tests may not be cleared or approved by the [place]  Food and Drug Administration, though the FDA has determined that such clearance or approval is not necessary  These tests are used for clinical purposes and they should not be regarded as investigational or for research  This laboratory has been approved by [place] 88, designated as a high-complexity laboratory and is qualified to perform these tests  Joselyn Salazar Description     A  The specimen is received in formalin, labeled with the patient's name and hospital number, and is designated " left upper arm"  The specimen consists of a 3 4 x 1 9 cm ellipse of tan skin excised to a depth of 0 7 cm  The epithelial surface exhibits a suture designated superior pole  For grossing purposes the superior pole is designated 1200  The epithelial surface also exhibits a keratotic patch located at the center of the epithelial surface 1 5 cm from the 1200 margin measuring 0 7 x 0 7 cm  The epithelial tips consisting of 1200 and 600 are inked red  The margin of resection is inked as follows:  12-3 o'clock inked yellow, 3-6 o'clock inked green, 6-9 o'clock inked orange, 9-12 o'clock inked blue  The specimen is serially sectioned revealing tan-yellow fibromembranous and fibrofatty, focally hemorrhagic cut surfaces  The specimen is entirely, sequentially submitted between sponges, 6 cassettes  1:  1200 and 600 tips, enface  2-6: Center, sequentially submitted, 2 pieces per cassette, keratotic patch is present in cassette 4      Note: The estimated total formalin fixation time based upon information provided by the submitting clinician and the standard processing schedule is under 72 hours      Cande     Clinical Information   Specimen A: left upper arm; Skin;  Excision; 79year old female with 0 9 cm x 1 cm squamous cell carcinoma; tagged on superior pole     ATTN DR Devon Arias

## 2021-06-14 ENCOUNTER — OFFICE VISIT (OUTPATIENT)
Dept: PHYSICAL THERAPY | Facility: CLINIC | Age: 81
End: 2021-06-14
Payer: MEDICARE

## 2021-06-14 DIAGNOSIS — S46.012A TRAUMATIC COMPLETE TEAR OF LEFT ROTATOR CUFF, INITIAL ENCOUNTER: ICD-10-CM

## 2021-06-14 DIAGNOSIS — S46.011D TRAUMATIC COMPLETE TEAR OF RIGHT ROTATOR CUFF, SUBSEQUENT ENCOUNTER: Primary | ICD-10-CM

## 2021-06-14 PROCEDURE — 97110 THERAPEUTIC EXERCISES: CPT | Performed by: PHYSICAL THERAPIST

## 2021-06-14 PROCEDURE — 97140 MANUAL THERAPY 1/> REGIONS: CPT | Performed by: PHYSICAL THERAPIST

## 2021-06-14 NOTE — PROGRESS NOTES
Daily Note     Today's date: 2021  Patient name: Iesha Goldberg  : 1940  MRN: [id number]  Referring provider: Aaliyah Barillas MD  Dx:   Encounter Diagnosis     ICD-10-CM    1  Traumatic complete tear of right rotator cuff, subsequent encounter  S46 011D    2  Traumatic complete tear of left rotator cuff, initial encounter  S46 012A        Start Time: 1445  Stop Time: 1530  Total time in clinic (min): 45 minutes    Subjective: No new complaints today  The patient reports improvement in symptoms since previous session  Objective: See treatment diary below      Assessment: The PT progressed to introductory PREs during today's treatment  Rows, extensions, and ER/IR with resistance band were all added to the patient's current program to promote improved shoulder strength overall  The patient tolerated manual and active treatment well today  The patient would benefit from further skilled PT services  Plan: Continue per plan of care        Precautions: HTN, DM, hx of breast cancer      Manuals 5/24/21 2021 6/2 6/3 6/14        Manual stretching all planes  AF SRB SRB SRB        GHJ post/inf mobs ( gr II-III) bilaterally   SRB SRB SRB        STM ant shoulder    SRB x15 min SRB                     Neuro Re-Ed             Seated scapular AROM all planes  posterior shoulder rolls 15x           Shoulder isometrics all planes  Flex,IR,ER x 5"x10ea Flex,IR,ER x 5"x10ea, HEP                                                                           Ther Ex             Active warmup  pullys 3' Pulleys flex/scap 2 min ea Pulleys flex/scap 2 min ea Pulleys flex/scap 2 min ea        Table slides (scaption) X20, HEP x20 review HEP          Scapular retractions 3 sec hold x10, HEP 3 sec x10 HEP HEP          Wand AAROM all planes  x10 chest press/flexion and ER    Flexion 2x10                     Rows/ext     RTB 2x15 ea, alt, HEP        ER/IR     RTB 2x15 ea, alt, HEP                     Ther Activity Gait Training                                       Modalities                                       Assessment IE            Education Prognosis, HEP, POC

## 2021-06-16 ENCOUNTER — OFFICE VISIT (OUTPATIENT)
Dept: PHYSICAL THERAPY | Facility: CLINIC | Age: 81
End: 2021-06-16
Payer: MEDICARE

## 2021-06-16 DIAGNOSIS — S46.012A TRAUMATIC COMPLETE TEAR OF LEFT ROTATOR CUFF, INITIAL ENCOUNTER: ICD-10-CM

## 2021-06-16 DIAGNOSIS — S46.011D TRAUMATIC COMPLETE TEAR OF RIGHT ROTATOR CUFF, SUBSEQUENT ENCOUNTER: Primary | ICD-10-CM

## 2021-06-16 PROCEDURE — 97140 MANUAL THERAPY 1/> REGIONS: CPT | Performed by: PHYSICAL THERAPIST

## 2021-06-16 PROCEDURE — 97110 THERAPEUTIC EXERCISES: CPT | Performed by: PHYSICAL THERAPIST

## 2021-06-16 NOTE — PROGRESS NOTES
Daily Note     Today's date: 2021  Patient name: Tatiana Bernabe  : 1940  MRN: [id number]  Referring provider: Alecia Mccloud MD  Dx:   Encounter Diagnosis     ICD-10-CM    1  Traumatic complete tear of right rotator cuff, subsequent encounter  S46 011D    2  Traumatic complete tear of left rotator cuff, initial encounter  S46 012A        Start Time: 1450  Stop Time: 1530  Total time in clinic (min): 40 minutes    Subjective: No new complaints today  The patient reports an increase in symptoms since previous session, citing that she was more sore for the rest of the day and the next day  Objective: See treatment diary below      Assessment: The PT modified the intensity of the current program during today's treatment  No new exercises were added to the patient's current program to promote tolerance to her current training level  The patient tolerated manual and active treatment well today  The patient would benefit from further skilled PT services  Plan: Continue per plan of care        Precautions: HTN, DM, hx of breast cancer      Manuals 5/24/21 2021 6/2 6/3 6/14 6/16       Manual stretching all planes  AF SRB SRB SRB SRB       GHJ post/inf mobs ( gr II-III) bilaterally   SRB SRB SRB SRB       STM ant shoulder    SRB x15 min SRB SRB                    Neuro Re-Ed             Seated scapular AROM all planes  posterior shoulder rolls 15x           Shoulder isometrics all planes  Flex,IR,ER x 5"x10ea Flex,IR,ER x 5"x10ea, HEP                                                                           Ther Ex             Active warmup  pullys 3' Pulleys flex/scap 2 min ea Pulleys flex/scap 2 min ea Pulleys flex/scap 2 min ea Pulleys flex/scap 2 min ea       Table slides (scaption) X20, HEP x20 review HEP          Scapular retractions 3 sec hold x10, HEP 3 sec x10 HEP HEP          Wand AAROM all planes  x10 chest press/flexion and ER    Flexion 2x10 W/ chest press 3x5, min assist Rows/ext     RTB 2x15 ea, alt, HEP RTB 2x15 ea, alt       ER/IR     RTB 2x15 ea, alt, HEP RTB 2x15 ea, alt                    Ther Activity                                       Gait Training                                       Modalities                                       Assessment IE            Education Prognosis, HEP, POC

## 2021-06-21 ENCOUNTER — OFFICE VISIT (OUTPATIENT)
Dept: PHYSICAL THERAPY | Facility: CLINIC | Age: 81
End: 2021-06-21
Payer: MEDICARE

## 2021-06-21 DIAGNOSIS — S46.011D TRAUMATIC COMPLETE TEAR OF RIGHT ROTATOR CUFF, SUBSEQUENT ENCOUNTER: Primary | ICD-10-CM

## 2021-06-21 DIAGNOSIS — S46.012A TRAUMATIC COMPLETE TEAR OF LEFT ROTATOR CUFF, INITIAL ENCOUNTER: ICD-10-CM

## 2021-06-21 PROCEDURE — 97140 MANUAL THERAPY 1/> REGIONS: CPT | Performed by: PHYSICAL THERAPIST

## 2021-06-21 PROCEDURE — 97110 THERAPEUTIC EXERCISES: CPT | Performed by: PHYSICAL THERAPIST

## 2021-06-21 NOTE — PROGRESS NOTES
Daily Note     Today's date: 2021  Patient name: Philippe Holman  : 1940  MRN: [id number]  Referring provider: Dave Grissom MD  Dx:   Encounter Diagnosis     ICD-10-CM    1  Traumatic complete tear of right rotator cuff, subsequent encounter  S46 011D    2  Traumatic complete tear of left rotator cuff, initial encounter  S46 012A        Start Time: 1445  Stop Time: 1530  Total time in clinic (min): 45 minutes    Subjective: No new complaints today  The patient reports worsening in symptoms since previous session, noting in particular pain across the anterior shoulder/clavicular region of the right UE  Objective: See treatment diary below      Assessment: The PT modified today's program to emphasize decreased intensity during today's treatment  No new interventions were added to the patient's current program due to the patient's report of increased symptom with increased intensity of training  The patient tolerated manual and active treatment well today  The patient would benefit from further skilled PT services  Plan: Continue per plan of care        Precautions: HTN, DM, hx of breast cancer      Manuals 5/24/21 2021 6/2 6/3 6/14 6/16 6/21      Manual stretching all planes  AF SRB SRB SRB SRB       GHJ post/inf mobs ( gr II-III) bilaterally   SRB SRB SRB SRB       STM ant shoulder    SRB x15 min SRB SRB SRB                   Neuro Re-Ed             Seated scapular AROM all planes  posterior shoulder rolls 15x           Shoulder isometrics all planes  Flex,IR,ER x 5"x10ea Flex,IR,ER x 5"x10ea, HEP                                                                           Ther Ex             Active warmup  pullys 3' Pulleys flex/scap 2 min ea Pulleys flex/scap 2 min ea Pulleys flex/scap 2 min ea Pulleys flex/scap 2 min ea Pulleys flex/scap 2 min ea      Table slides (scaption) X20, HEP x20 review HEP          Scapular retractions 3 sec hold x10, HEP 3 sec x10 HEP HEP          Wand AAROM all planes  x10 chest press/flexion and ER    Flexion 2x10 W/ chest press 3x5, min assist                    Rows/ext     RTB 2x15 ea, alt, HEP RTB 2x15 ea, alt RTB 2x10 ea, alt      ER/IR     RTB 2x15 ea, alt, HEP RTB 2x15 ea, alt YTB 2x10 ea, alt      UE pendulums       2x10 ea cw/ccw      PBall roll-outs       3 ways 2x10 ea                                                          Ther Activity                                       Gait Training                                       Modalities                                       Assessment IE            Education Prognosis, HEP, POC

## 2021-06-23 ENCOUNTER — OFFICE VISIT (OUTPATIENT)
Dept: PHYSICAL THERAPY | Facility: CLINIC | Age: 81
End: 2021-06-23
Payer: MEDICARE

## 2021-06-23 DIAGNOSIS — S46.011D TRAUMATIC COMPLETE TEAR OF RIGHT ROTATOR CUFF, SUBSEQUENT ENCOUNTER: Primary | ICD-10-CM

## 2021-06-23 DIAGNOSIS — S46.012A TRAUMATIC COMPLETE TEAR OF LEFT ROTATOR CUFF, INITIAL ENCOUNTER: ICD-10-CM

## 2021-06-23 PROCEDURE — 97140 MANUAL THERAPY 1/> REGIONS: CPT | Performed by: PHYSICAL THERAPIST

## 2021-06-23 PROCEDURE — 97110 THERAPEUTIC EXERCISES: CPT | Performed by: PHYSICAL THERAPIST

## 2021-06-23 NOTE — PROGRESS NOTES
Daily Note     Today's date: 2021  Patient name: Cory Silveira  : 1940  MRN: [id number]  Referring provider: Rachel Campos MD  Dx:   Encounter Diagnosis     ICD-10-CM    1  Traumatic complete tear of right rotator cuff, subsequent encounter  S46 011D    2  Traumatic complete tear of left rotator cuff, initial encounter  S46 012A        Start Time: 1448  Stop Time: 1530  Total time in clinic (min): 42 minutes    Subjective: No new complaints today  The patient reports a significant increase in symptoms since previous session  Objective: See treatment diary below      Assessment: The PT modified the patient's care to further prioritize symptom modification and reduction during today's treatment  The patient tolerated manual and active treatment well today, reporting a decrease in pain following her treatment  The patient would benefit from further skilled PT services as she continues to get   Plan: Continue per plan of care        Precautions: HTN, DM, hx of breast cancer      Manuals 5/24/21 2021 6/2 6/3 6/14 6/16 6/21 6/23     Manual stretching all planes  AF SRB SRB SRB SRB  SRB     GHJ post/inf mobs ( gr II-III) bilaterally   SRB SRB SRB SRB  SRB     STM ant shoulder    SRB x15 min SRB SRB SRB SRB                  Neuro Re-Ed             Seated scapular AROM all planes  posterior shoulder rolls 15x           Shoulder isometrics all planes  Flex,IR,ER x 5"x10ea Flex,IR,ER x 5"x10ea, HEP                                                                           Ther Ex             Active warmup  pullys 3' Pulleys flex/scap 2 min ea Pulleys flex/scap 2 min ea Pulleys flex/scap 2 min ea Pulleys flex/scap 2 min ea Pulleys flex/scap 2 min ea Pulleys flex/scap 2 min ea     Table slides (scaption) X20, HEP x20 review HEP          Scapular retractions 3 sec hold x10, HEP 3 sec x10 HEP HEP          Wand AAROM all planes  x10 chest press/flexion and ER    Flexion 2x10 W/ chest press 3x5, min assist                    Rows/ext     RTB 2x15 ea, alt, HEP RTB 2x15 ea, alt RTB 2x10 ea, alt      ER/IR     RTB 2x15 ea, alt, HEP RTB 2x15 ea, alt YTB 2x10 ea, alt      UE pendulums       2x10 ea cw/ccw      PBall roll-outs       3 ways 2x10 ea 3 ways 2x10 ea                                                         Ther Activity                                       Gait Training                                       Modalities                                       Assessment IE            Education Prognosis, HEP, POC

## 2021-06-28 ENCOUNTER — OFFICE VISIT (OUTPATIENT)
Dept: PHYSICAL THERAPY | Facility: CLINIC | Age: 81
End: 2021-06-28
Payer: MEDICARE

## 2021-06-28 DIAGNOSIS — S46.011D TRAUMATIC COMPLETE TEAR OF RIGHT ROTATOR CUFF, SUBSEQUENT ENCOUNTER: Primary | ICD-10-CM

## 2021-06-28 DIAGNOSIS — S46.012A TRAUMATIC COMPLETE TEAR OF LEFT ROTATOR CUFF, INITIAL ENCOUNTER: ICD-10-CM

## 2021-06-28 PROCEDURE — 97140 MANUAL THERAPY 1/> REGIONS: CPT

## 2021-06-28 PROCEDURE — 97110 THERAPEUTIC EXERCISES: CPT

## 2021-06-28 NOTE — PROGRESS NOTES
Daily Note     Today's date: 2021  Patient name: Cory Silveira  : 1940  MRN: [id number]  Referring provider: Rachel Campos MD  Dx:   Encounter Diagnosis     ICD-10-CM    1  Traumatic complete tear of right rotator cuff, subsequent encounter  S46 011D    2  Traumatic complete tear of left rotator cuff, initial encounter  S46 012A                   Subjective: Pt reports both shldrs have been feeling better, compared to prior to LV  Felt the massage performed LV really helped  Has follow-up with MD this Wednesday  Objective: See treatment diary below      Assessment: Tolerated treatment well  Responded well to manual STM to bilat shldr prior to PROM  Slight pain in R shldr at end range during ER/IR; resolved slightly when brought to 45 deg abd  Tolerated adding TB row/ext back into program   Slight discomfort in R ant shldr/pec following completion of TB ext  Patient demonstrated fatigue post treatment and would benefit from continued PT      Plan: Continue per plan of care        Precautions: HTN, DM, hx of breast cancer      Manuals 5/24/21 2021 6/2 6/3 6/14 6/16 6/21 6/23 6/28    Manual stretching all planes  AF SRB SRB SRB SRB  SRB     GHJ post/inf mobs ( gr II-III) bilaterally   SRB SRB SRB SRB  SRB     STM ant shoulder    SRB x15 min SRB SRB SRB SRB                  Neuro Re-Ed             Seated scapular AROM all planes  posterior shoulder rolls 15x           Shoulder isometrics all planes  Flex,IR,ER x 5"x10ea Flex,IR,ER x 5"x10ea, HEP                                                                           Ther Ex             Active warmup  pullys 3' Pulleys flex/scap 2 min ea Pulleys flex/scap 2 min ea Pulleys flex/scap 2 min ea Pulleys flex/scap 2 min ea Pulleys flex/scap 2 min ea Pulleys flex/scap 2 min ea Pulleys flex/scap 2 min ea    Table slides (scaption) X20, HEP x20 review HEP          Scapular retractions 3 sec hold x10, HEP 3 sec x10 HEP HEP          Wand AAROM all planes  x10 chest press/flexion and ER    Flexion 2x10 W/ chest press 3x5, min assist                    Rows/ext     RTB 2x15 ea, alt, HEP RTB 2x15 ea, alt RTB 2x10 ea, alt      ER/IR     RTB 2x15 ea, alt, HEP RTB 2x15 ea, alt YTB 2x10 ea, alt      UE pendulums       2x10 ea cw/ccw  3x10 ea cw/ccw    PBall roll-outs       3 ways 2x10 ea 3 ways 2x10 ea                                                         Ther Activity                                       Gait Training                                       Modalities                                       Assessment IE            Education Prognosis, HEP, POC

## 2021-06-30 ENCOUNTER — OFFICE VISIT (OUTPATIENT)
Dept: OBGYN CLINIC | Facility: OTHER | Age: 81
End: 2021-06-30
Payer: MEDICARE

## 2021-06-30 VITALS
HEIGHT: 62 IN | BODY MASS INDEX: 42.33 KG/M2 | HEART RATE: 80 BPM | WEIGHT: 230 LBS | SYSTOLIC BLOOD PRESSURE: 133 MMHG | DIASTOLIC BLOOD PRESSURE: 79 MMHG

## 2021-06-30 DIAGNOSIS — S46.011D TRAUMATIC COMPLETE TEAR OF RIGHT ROTATOR CUFF, SUBSEQUENT ENCOUNTER: Primary | ICD-10-CM

## 2021-06-30 DIAGNOSIS — S46.812A TRAUMATIC TEAR OF SUPRASPINATUS TENDON OF LEFT SHOULDER, INITIAL ENCOUNTER: ICD-10-CM

## 2021-06-30 PROCEDURE — 99213 OFFICE O/P EST LOW 20 MIN: CPT | Performed by: ORTHOPAEDIC SURGERY

## 2021-06-30 NOTE — PROGRESS NOTES
Orthopaedic Surgery - Office Note  Jorge Lorenzo (48 y o  female)   : 1940   MRN: [id number]  Encounter Date: 2021    Chief Complaint   Patient presents with    Right Shoulder - Follow-up    Left Shoulder - Follow-up       Assessment / Plan  bilateral traumatic rotator cuff tears    · Activity as tolerated  · Home exercise program reviewed  · Continue outpatient PT and transition to HEP  · Anti-inflammatories or Tylenol prn pain  · heat and ice for pain control  Return if symptoms worsen or fail to improve  History of Present Illness  Jorge Lorenoz is a 79 y o  female who presents for follow-up evaluation of bilateral traumatic rotator cuff tears  Since her last appointment she has been compliant with formal physical therapy and is very happy with the progress that she has made  She is able to complete her activities of daily living without pain or problems  She would like to continue treating her bilateral shoulders without surgical intervention  She denies any further injury or trauma  She denies any distal paraesthesias  Review of Systems  Pertinent items are noted in HPI  All other systems were reviewed and are negative  Physical Exam  /79   Pulse 80   Ht 5' 2" (1 575 m)   Wt 104 kg (230 lb)   LMP  (LMP Unknown)   BMI 42 07 kg/m²   Cons: Appears well  No apparent distress  Psych: Alert  Oriented x3  Mood and affect normal   Eyes: PERRLA, EOMI  Resp: Normal effort  No audible wheezing or stridor  CV: Palpable pulse  No discernable arrhythmia  No LE edema  Lymph:  No palpable cervical, axillary, or inguinal lymphadenopathy  Skin: Warm  No palpable masses  No visible lesions  Neuro: Normal muscle tone  Normal and symmetric DTR's  Bilateral Shoulder Exam  Alignment / Posture:  Normal shoulder posture  Inspection:  No swelling  No edema  No erythema  No ecchymosis  No muscle atrophy  Palpation:  No tenderness  No effusion  No warmth    ROM:  Shoulder FE 160  Shoulder ER 70  Strength:  shoulder FE: left 4/5 right 4-/5  Shoulder ER: left 4/5  right 4-/5  Shoulder IR: 5/5  Stability:  No objective shoulder instability  Tests: (-) Belly press  Neurovascular:  Sensation intact in Ax/R/M/U nerve distributions  2+ radial pulse  Studies Reviewed  No studies to review    Procedures  No procedures today  Medical, Surgical, Family, and Social History  The patient's medical history, family history, and social history, were reviewed and updated as appropriate      Past Medical History:   Diagnosis Date    Arthritis     Cancer [facility])     breast cancer    Diabetes mellitus ([facility] [address] )     Disease of thyroid gland     GERD (gastroesophageal reflux disease)     Headache     Hemorrhoids     Hx of radiation therapy 03/09/2012    PBRT    Hypertension     Hypertension     Night sweats     Rheumatoid arthritis (HCC)     Rotator cuff injury     Trigger finger     right and left hand    Urinary incontinence     Use of anastrozole (Arimidex)     Use of letrozole (Femara)        Past Surgical History:   Procedure Laterality Date    ABDOMINAL SURGERY      APPENDECTOMY      [facility]SY Left 01/162012    [facility]    BREAST LUMPECTOMY Left 02/17/2012    BREAST SURGERY      CATARACT EXTRACTION      CHOLECYSTECTOMY      FOOT SURGERY      right and left foot    HYSTERECTOMY      JOINT REPLACEMENT      KNEE SURGERY      PA INCISE FINGER TENDON SHEATH Right 8/7/2018    Procedure: RELEASE TRIGGER FINGER - LONG FINGER AND RING FINGER;  Surgeon: Marvin Bryan MD;  [facility];  Service: Orthopedics    PA INCISE FINGER TENDON SHEATH Left 8/14/2018    Procedure: RELEASE TRIGGER FINGER - LEFT INDEX FINGER;  Surgeon: Marvin Bryan MD;  [facility];  Service: Orthopedics    REPAIR RECTOCELE      TONSILLECTOMY      TUBAL LIGATION         Family History   Problem Relation Age of Onset    Prostate cancer Father         age at dx [place] Prostate cancer Brother 68    Thyroid cancer Brother         age at dx unk    Skin cancer Brother     Thyroid cancer Daughter 39        age at dx unk    Breast cancer Maternal Aunt 71        age at dx unk    Lung cancer Maternal Uncle         age at dx unk    No Known Problems Son     No Known Problems Son     No Known Problems Son     Pancreatic cancer Maternal Aunt         unknown age       Social History     Occupational History    Not on file   Tobacco Use    Smoking status: Never Smoker    Smokeless tobacco: Never Used   Substance and Sexual Activity    Alcohol use: Yes    Drug use: No    Sexual activity: Not on file       Allergies   Allergen Reactions    Sulfa Antibiotics Rash    Penicillins Rash         Current Outpatient Medications:     acetaminophen (TYLENOL 8 HOUR) 650 mg CR tablet, Take 1 tablet (650 mg total) by mouth every 8 (eight) hours, Disp: 15 tablet, Rfl: 0    amLODIPine (NORVASC) 10 mg tablet, Take 10 mg by mouth daily  , Disp: , Rfl:     calcium citrate-vitamin D (CITRACAL+D) 315-200 MG-UNIT per tablet, Take 1 tablet by mouth daily  , Disp: , Rfl:     doxycycline hyclate (VIBRAMYCIN) 100 mg capsule, Take 100 mg by mouth 2 (two) times a day, Disp: , Rfl:     furosemide (LASIX) 40 mg tablet, Take 40 mg by mouth daily  , Disp: , Rfl:     Lancets (OneTouch Delica Plus [id number]) MISC, TEST BLOOD GLUCOSE DAILY, Disp: , Rfl:     losartan (COZAAR) 100 MG tablet, Take 100 mg by mouth daily  , Disp: , Rfl:     metFORMIN (GLUCOPHAGE) 1000 MG tablet, Take 1,000 mg by mouth 2 (two) times a day with meals  , Disp: , Rfl:     Multiple Vitamin (MULTIVITAMIN) tablet, Take 1 tablet by mouth daily  , Disp: , Rfl:     mupirocin (BACTROBAN) 2 % ointment, APPLY A SMALL AMOUNT TO THE AFFECTED AREA THREE TIMES DAILY, Disp: , Rfl:     OneTouch Verio test strip, use 1 TEST STRIP to TEST BLOOD SUGAR once daily, Disp: , Rfl:     SYNTHROID 88 MCG tablet, , Disp: , Rfl:     lidocaine (LIDODERM) 5 %, Apply 1 patch topically daily for 15 days Remove & Discard patch within 12 hours or as directed by MD, Disp: 15 patch, Rfl: 0    pantoprazole (PROTONIX) 40 mg tablet, Take 1 tablet (40 mg total) by mouth daily in the early morning, Disp: 30 tablet, Rfl: 0      Jalyn Palangio    Scribe Attestation    I,:  Jerry Jamison am acting as a scribe while in the presence of the attending physician :       I,:  Shikha Ahn MD personally performed the services described in this documentation    as scribed in my presence :

## 2021-07-01 ENCOUNTER — OFFICE VISIT (OUTPATIENT)
Dept: PHYSICAL THERAPY | Facility: CLINIC | Age: 81
End: 2021-07-01
Payer: MEDICARE

## 2021-07-01 DIAGNOSIS — S46.012A TRAUMATIC COMPLETE TEAR OF LEFT ROTATOR CUFF, INITIAL ENCOUNTER: ICD-10-CM

## 2021-07-01 DIAGNOSIS — S46.011D TRAUMATIC COMPLETE TEAR OF RIGHT ROTATOR CUFF, SUBSEQUENT ENCOUNTER: Primary | ICD-10-CM

## 2021-07-01 PROCEDURE — 97140 MANUAL THERAPY 1/> REGIONS: CPT | Performed by: PHYSICAL THERAPIST

## 2021-07-01 PROCEDURE — 97110 THERAPEUTIC EXERCISES: CPT | Performed by: PHYSICAL THERAPIST

## 2021-07-01 NOTE — PROGRESS NOTES
Daily Note     Today's date: 2021  Patient name: Mark Dillon  : 1940  MRN: [id number]  Referring provider: Nguyen Martin MD  Dx:   Encounter Diagnosis     ICD-10-CM    1  Traumatic complete tear of right rotator cuff, subsequent encounter  S46 011D    2  Traumatic complete tear of left rotator cuff, initial encounter  S46 012A        Start Time: 1615  Stop Time: 1700  Total time in clinic (min): 45 minutes    Subjective: No new complaints today  The patient reports improvement in symptoms since previous session  Objective: See treatment diary below      Assessment: The PT continued to promote improved mobility during today's treatment  No new interventions added to the patient's current program to prevent symptom exacerbation  The patient tolerated manual and active treatment well today  The patient would benefit from further skilled PT services  Plan: Continue per plan of care        Precautions: HTN, DM, hx of breast cancer      Manuals 21 6 6/3 6/14 6/16 6/21 6/23 6/28 7/1   Manual stretching all planes  AF SRB SRB SRB SRB  SRB  SRB   GHJ post/inf mobs ( gr II-III) bilaterally   SRB SRB SRB SRB  SRB  SRB   STM ant shoulder    SRB x15 min SRB SRB SRB SRB  SRB                Neuro Re-Ed             Seated scapular AROM all planes  posterior shoulder rolls 15x           Shoulder isometrics all planes  Flex,IR,ER x 5"x10ea Flex,IR,ER x 5"x10ea, HEP                                                                           Ther Ex             Active warmup  pullys 3' Pulleys flex/scap 2 min ea Pulleys flex/scap 2 min ea Pulleys flex/scap 2 min ea Pulleys flex/scap 2 min ea Pulleys flex/scap 2 min ea Pulleys flex/scap 2 min ea Pulleys flex/scap 2 min ea Pulleys flex/scap 2 min ea   Table slides (scaption) X20, HEP x20 review HEP          Scapular retractions 3 sec hold x10, HEP 3 sec x10 HEP HEP          Wand AAROM all planes  x10 chest press/flexion and ER    Flexion 2x10 W/ chest press 3x5, min assist                    Rows/ext     RTB 2x15 ea, alt, HEP RTB 2x15 ea, alt RTB 2x10 ea, alt      ER/IR     RTB 2x15 ea, alt, HEP RTB 2x15 ea, alt YTB 2x10 ea, alt      UE pendulums       2x10 ea cw/ccw  3x10 ea cw/ccw    PBall roll-outs       3 ways 2x10 ea 3 ways 2x10 ea  3 ways 2x10 ea                                                       Ther Activity                                       Gait Training                                       Modalities                                       Assessment IE            Education Prognosis, HEP, POC

## 2021-07-08 ENCOUNTER — OFFICE VISIT (OUTPATIENT)
Dept: PHYSICAL THERAPY | Facility: CLINIC | Age: 81
End: 2021-07-08
Payer: MEDICARE

## 2021-07-08 DIAGNOSIS — S46.011D TRAUMATIC COMPLETE TEAR OF RIGHT ROTATOR CUFF, SUBSEQUENT ENCOUNTER: Primary | ICD-10-CM

## 2021-07-08 DIAGNOSIS — S46.012A TRAUMATIC COMPLETE TEAR OF LEFT ROTATOR CUFF, INITIAL ENCOUNTER: ICD-10-CM

## 2021-07-08 PROCEDURE — 97110 THERAPEUTIC EXERCISES: CPT | Performed by: PHYSICAL THERAPIST

## 2021-07-08 PROCEDURE — 97140 MANUAL THERAPY 1/> REGIONS: CPT | Performed by: PHYSICAL THERAPIST

## 2021-07-08 NOTE — PROGRESS NOTES
Daily Note     Today's date: 2021  Patient name: Harpal Ko  : 1940  MRN: [id number]  Referring provider: Andre Ferreira MD  Dx:   Encounter Diagnosis     ICD-10-CM    1  Traumatic complete tear of right rotator cuff, subsequent encounter  S46 011D    2  Traumatic complete tear of left rotator cuff, initial encounter  S46 012A        Start Time: 1215  Stop Time: 1300  Total time in clinic (min): 45 minutes    Subjective: No new complaints today  The patient reports improvement in symptoms since previous session  Objective: See treatment diary below      Assessment: The PT continued to emphasize mobility during today's treatment  Self stretching for anterior and posterior capsule added to the patient's current program to promote improved home management  The patient tolerated manual and active treatment well today  The patient would benefit from further skilled PT services  Plan: Continue per plan of care        Precautions: HTN, DM, hx of breast cancer      Manuals    Manual stretching all planes SRB     SRB  SRB  SRB   GHJ post/inf mobs ( gr II-III) bilaterally SRB     SRB  SRB  SRB   STM ant shoulder SRB     SRB SRB SRB  SRB                Neuro Re-Ed             Seated scapular AROM all planes             Shoulder isometrics all planes                                                                              Ther Ex             Active warmup Pulleys flex/scap 2 min ea     Pulleys flex/scap 2 min ea Pulleys flex/scap 2 min ea Pulleys flex/scap 2 min ea Pulleys flex/scap 2 min ea Pulleys flex/scap 2 min ea   Table slides (scaption)             Scapular retractions             Wand AAROM all planes      W/ chest press 3x5, min assist                    Rows/ext      RTB 2x15 ea, alt RTB 2x10 ea, alt      ER/IR      RTB 2x15 ea, alt YTB 2x10 ea, alt      UE pendulums       2x10 ea cw/ccw  3x10 ea cw/ccw    PBall roll-outs       3 ways 2x10 ea 3 ways 2x10 ea  3 ways 2x10 ea   Stair rail stretch-flexion 10 sec x10, HEP            Doorway stretch-horiz abd 10 sec x10, HEP                                      Ther Activity                                       Gait Training                                       Modalities                                       Assessment             Education

## 2021-07-15 ENCOUNTER — APPOINTMENT (OUTPATIENT)
Dept: PHYSICAL THERAPY | Facility: CLINIC | Age: 81
End: 2021-07-15
Payer: MEDICARE

## 2021-07-22 ENCOUNTER — EVALUATION (OUTPATIENT)
Dept: PHYSICAL THERAPY | Facility: CLINIC | Age: 81
End: 2021-07-22
Payer: MEDICARE

## 2021-07-22 DIAGNOSIS — S46.011D TRAUMATIC COMPLETE TEAR OF RIGHT ROTATOR CUFF, SUBSEQUENT ENCOUNTER: Primary | ICD-10-CM

## 2021-07-22 DIAGNOSIS — S46.012A TRAUMATIC COMPLETE TEAR OF LEFT ROTATOR CUFF, INITIAL ENCOUNTER: ICD-10-CM

## 2021-07-22 PROCEDURE — 97140 MANUAL THERAPY 1/> REGIONS: CPT | Performed by: PHYSICAL THERAPIST

## 2021-07-22 PROCEDURE — 97112 NEUROMUSCULAR REEDUCATION: CPT | Performed by: PHYSICAL THERAPIST

## 2021-07-22 NOTE — PROGRESS NOTES
PT Re-Evaluation    Today's date: 2021  Patient name: Sorin Ochoa  : 1940  MRN: [id number]  Referring provider: Neel Hernandez, *  Dx:   Encounter Diagnosis     ICD-10-CM    1  Traumatic complete tear of right rotator cuff, subsequent encounter  S46 011D    2  Traumatic complete tear of left rotator cuff, initial encounter  S46 012A        Start Time: 1450  Stop Time: 1530  Total time in clinic (min): 40 minutes    Subjective: The patient presents for re-evaluation today  The patient reports improvement in symptoms since beginning skilled physical therapy  The patient reports 2/10 pain at it's worst over the past 24 hours, and reports 80% improvement in overall condition since beginning formal PT care  The patient's chief remaining concern is strength overhead, and making sure her improvement stays          Objective: See treatment diary below      Active Range of Motion   Left Shoulder   Flexion: 166 degrees   Abduction: 151 degrees   External rotation BTH: T2  Internal rotation BTB: T10     Right Shoulder   Flexion: 140 degrees   Abduction: 140 degrees   External rotation BTH: T1   Internal rotation BTB: T8    Passive Range of Motion   Left Shoulder   Flexion: 170 degrees   Abduction: 150 degrees     Right Shoulder   Flexion: 160 degrees   Abduction: 160 degrees     Scapular Mobility   Left Shoulder   Scapular Mobility with Shoulder to 90° FF   Scapular elevation: minimal    Right Shoulder   Scapular Mobility with Shoulder to 90° FF   Scapular elevation: minimal    Strength/Myotome Testing     Left Shoulder     Planes of Motion   Flexion: 4  Abduction: 4  External rotation at 0°: 4  Internal rotation at 0°: 4    Right Shoulder     Planes of Motion   Flexion: 4  Abduction: 4  External rotation at 0°: 4  Internal rotation at 0°: 4      Assessment: Sorin Ochoa is a pleasant 79 y o  female who has been receiving PT intervention for bilateral shoulder pain secondary to rotator cuff tears bilaterally  The patient has demonstrated decreased pain, increased strength, increased ROM, increased joint mobility and increased activity tolerance since beginning treatment  Primary remaining impairments include:    1)  Overhead strength remains limited    2)  Pain at end-ranges of motion        Goals  Patient will be independent with home exercise program    Patient will be able to manage symptoms independently       Short Term Goals: to be achieved by 4 weeks  1) Patient to be independent with basic HEP  2) Decrease pain to 4/10 at its worst  3) Increase shoulder ROM by 5-10 degrees in all planes  4) Increase shoulder strength by 1/2 MMT grade in all deficient planes    Long Term Goals: to be achieved by discharge  1) FOTO equal to or greater than 59  2) Patient to be independent with comprehensive HEP  3) Patient will demonstrate maximal over head reaching  4) Increase UE strength to 5/5 MMT grade in all planes to improve a/iadls  5) Patient to report no sleep interruption secondary to pain    Plan: The patient has met or exceeded her short & long term goals and is a candidate for discharge from formal PT care to an independent home program        Precautions: HTN, DM, hx of breast cancer      Manuals 7/8 7/22 6/16 6/21 6/23 6/28 7/1   Manual stretching all planes SRB SRB    SRB  SRB  SRB   GHJ post/inf mobs ( gr II-III) bilaterally SRB SRB    SRB  SRB  SRB   STM ant shoulder SRB SRB    SRB SRB SRB  SRB                Neuro Re-Ed             Seated scapular AROM all planes             Shoulder isometrics all planes                                                                              Ther Ex             Active warmup Pulleys flex/scap 2 min ea Pulleys flex/scap 2 min ea    Pulleys flex/scap 2 min ea Pulleys flex/scap 2 min ea Pulleys flex/scap 2 min ea Pulleys flex/scap 2 min ea Pulleys flex/scap 2 min ea   Table slides (scaption)             Scapular retractions             Wand AAROM all planes      W/ chest press 3x5, min assist                    Rows/ext      RTB 2x15 ea, alt RTB 2x10 ea, alt      ER/IR      RTB 2x15 ea, alt YTB 2x10 ea, alt      UE pendulums       2x10 ea cw/ccw  3x10 ea cw/ccw    PBall roll-outs       3 ways 2x10 ea 3 ways 2x10 ea  3 ways 2x10 ea   Stair rail stretch-flexion 10 sec x10, HEP            Doorway stretch-horiz abd 10 sec x10, HEP                                      Ther Activity                                       Gait Training                                       Modalities                                       Assessment  RE, FOTO           Education  DC planning

## 2021-07-29 ENCOUNTER — APPOINTMENT (OUTPATIENT)
Dept: PHYSICAL THERAPY | Facility: CLINIC | Age: 81
End: 2021-07-29
Payer: MEDICARE

## 2021-09-30 PROCEDURE — U0003 INFECTIOUS AGENT DETECTION BY NUCLEIC ACID (DNA OR RNA); SEVERE ACUTE RESPIRATORY SYNDROME CORONAVIRUS 2 (SARS-COV-2) (CORONAVIRUS DISEASE [COVID-19]), AMPLIFIED PROBE TECHNIQUE, MAKING USE OF HIGH THROUGHPUT TECHNOLOGIES AS DESCRIBED BY CMS-2020-01-R: HCPCS | Performed by: INTERNAL MEDICINE

## 2021-09-30 PROCEDURE — U0005 INFEC AGEN DETEC AMPLI PROBE: HCPCS | Performed by: INTERNAL MEDICINE

## 2021-11-04 ENCOUNTER — OFFICE VISIT (OUTPATIENT)
Dept: DERMATOLOGY | Facility: CLINIC | Age: 81
End: 2021-11-04
Payer: MEDICARE

## 2021-11-04 VITALS — BODY MASS INDEX: 41.56 KG/M2 | TEMPERATURE: 97.6 F | WEIGHT: 227.2 LBS

## 2021-11-04 DIAGNOSIS — L81.4 SOLAR LENTIGO: Primary | ICD-10-CM

## 2021-11-04 DIAGNOSIS — D22.60 MULTIPLE BENIGN NEVI OF UPPER EXTREMITY, LOWER EXTREMITY, AND TRUNK: ICD-10-CM

## 2021-11-04 DIAGNOSIS — D22.5 MULTIPLE BENIGN NEVI OF UPPER EXTREMITY, LOWER EXTREMITY, AND TRUNK: ICD-10-CM

## 2021-11-04 DIAGNOSIS — L82.1 SEBORRHEIC KERATOSIS: ICD-10-CM

## 2021-11-04 DIAGNOSIS — D18.01 CHERRY ANGIOMA: ICD-10-CM

## 2021-11-04 DIAGNOSIS — D22.70 MULTIPLE BENIGN NEVI OF UPPER EXTREMITY, LOWER EXTREMITY, AND TRUNK: ICD-10-CM

## 2021-11-04 DIAGNOSIS — L57.0 ACTINIC KERATOSIS: ICD-10-CM

## 2021-11-04 PROCEDURE — 17000 DESTRUCT PREMALG LESION: CPT | Performed by: DERMATOLOGY

## 2021-11-04 PROCEDURE — 99213 OFFICE O/P EST LOW 20 MIN: CPT | Performed by: DERMATOLOGY

## 2021-11-04 PROCEDURE — 17003 DESTRUCT PREMALG LES 2-14: CPT | Performed by: DERMATOLOGY

## 2022-02-09 ENCOUNTER — HOSPITAL ENCOUNTER (OUTPATIENT)
Dept: MAMMOGRAPHY | Facility: HOSPITAL | Age: 82
Discharge: HOME | End: 2022-02-09
Attending: SURGERY
Payer: MEDICARE

## 2022-02-09 VITALS — WEIGHT: 227.07 LBS | BODY MASS INDEX: 41.79 KG/M2 | HEIGHT: 62 IN

## 2022-02-09 DIAGNOSIS — Z12.31 SCREENING MAMMOGRAM, ENCOUNTER FOR: ICD-10-CM

## 2022-02-09 PROCEDURE — 77063 BREAST TOMOSYNTHESIS BI: CPT

## 2022-02-09 PROCEDURE — 77067 SCR MAMMO BI INCL CAD: CPT

## 2022-04-07 ENCOUNTER — OFFICE VISIT (OUTPATIENT)
Dept: SURGICAL ONCOLOGY | Facility: CLINIC | Age: 82
End: 2022-04-07
Payer: MEDICARE

## 2022-04-07 VITALS
BODY MASS INDEX: 41.96 KG/M2 | SYSTOLIC BLOOD PRESSURE: 132 MMHG | DIASTOLIC BLOOD PRESSURE: 84 MMHG | RESPIRATION RATE: 18 BRPM | TEMPERATURE: 97.1 F | HEIGHT: 62 IN | HEART RATE: 87 BPM | WEIGHT: 228 LBS | OXYGEN SATURATION: 98 %

## 2022-04-07 DIAGNOSIS — Z12.31 SCREENING MAMMOGRAM, ENCOUNTER FOR: ICD-10-CM

## 2022-04-07 DIAGNOSIS — Z85.3 ENCOUNTER FOR FOLLOW-UP SURVEILLANCE OF BREAST CANCER: Primary | ICD-10-CM

## 2022-04-07 DIAGNOSIS — Z08 ENCOUNTER FOR FOLLOW-UP SURVEILLANCE OF BREAST CANCER: Primary | ICD-10-CM

## 2022-04-07 DIAGNOSIS — Z85.3 PERSONAL HISTORY OF BREAST CANCER: ICD-10-CM

## 2022-04-07 PROCEDURE — 99213 OFFICE O/P EST LOW 20 MIN: CPT | Performed by: SURGERY

## 2022-04-07 NOTE — PROGRESS NOTES
Surgical Oncology Follow Up       [address]  [facility]  [address]  [facility] [address]    Agataralee Mars  1940  [id number]  [address]  [facility] [place]  [address] [facility] [address]    Chief Complaint   Patient presents with    Follow-up       Assessment/Plan   Diagnoses and all orders for this visit:    Encounter for follow-up surveillance of breast cancer    Personal history of breast cancer    Screening mammogram, encounter for  -     Mammo screening bilateral w 3d & cad; Future        Advance Care Planning/Advance Directives:  Discussed disease status, cancer treatment plans and/or cancer treatment goals with the patient  Oncology History:    Oncology History   Personal history of breast cancer   1/11/2012 Initial Diagnosis    Adenocarcinoma of breast ([facility] [address] )     1/16/2012 Surgery    Left breast biopsy , [facility]     2/17/2012 Surgery    Left partial mastectomy, [facility]     3/19/2012 -  Radiation    PBRT  Dr Orlando Thornton,  Dr Guy Vogel     4/2012 -  Hormone Therapy    Anastrazole, Letrazole  Dr Christi Mcnally  Pt d/c due to joint aches  History of Present Illness:  Breast cancer follow-up, no breast referable concerns  -Interval History:  Benign mammo    Review of Systems:  Review of Systems   Constitutional: Negative  Negative for appetite change and fever  Eyes: Negative  Respiratory: Negative for shortness of breath  Cardiovascular: Negative  Gastrointestinal: Negative  Endocrine: Negative  Genitourinary: Negative  Musculoskeletal: Negative  Negative for arthralgias and myalgias  Skin: Negative  Allergic/Immunologic: Negative  Neurological: Negative  Hematological: Negative  Negative for adenopathy  Does not bruise/bleed easily  Psychiatric/Behavioral: Negative          Patient Active Problem List   Diagnosis    Personal history of breast cancer    Screening mammogram, encounter for    S/P trigger finger release    Intrinsic muscle tightness    Encounter for follow-up surveillance of breast cancer    Arthritis    Diabetes mellitus ([facility] [address] )    GERD (gastroesophageal reflux disease)    Disease of thyroid gland    Hypertension    Postural dizziness with near syncope    Fall    Hypokalemia    Breast cancer (HCC)    Transaminitis    Obstructive sleep apnea    Right shoulder pain    Traumatic complete tear of right rotator cuff    Traumatic tear of supraspinatus tendon of left shoulder     Past Medical History:   Diagnosis Date    Arthritis     Diabetes mellitus ([address] )     Disease of thyroid gland     GERD (gastroesophageal reflux disease)     Headache     Hemorrhoids     Hx of radiation therapy 03/09/2012    PBRT    Hypertension     Hypertension     Night sweats     Rheumatoid arthritis (HCC)     Rotator cuff injury     Trigger finger     right and left hand    Urinary incontinence     Use of anastrozole (Arimidex)     Use of letrozole (Femara)      Past Surgical History:   Procedure Laterality Date    ABDOMINAL SURGERY      APPENDECTOMY      BREAST BIOPSY Left 01/162012    [facility]    BREAST LUMPECTOMY Left 02/17/2012    BREAST SURGERY      CATARACT EXTRACTION      CHOLECYSTECTOMY      FOOT SURGERY      right and left foot    HYSTERECTOMY      JOINT REPLACEMENT      KNEE SURGERY      AR INCISE FINGER TENDON SHEATH Right 8/7/2018    Procedure: RELEASE TRIGGER FINGER - LONG FINGER AND RING FINGER;  Surgeon: Vandy Runner, MD;  Location: [facility];  Service: Orthopedics    AR INCISE FINGER TENDON SHEATH Left 8/14/2018    Procedure: RELEASE TRIGGER FINGER - LEFT INDEX FINGER;  Surgeon: Vandy Runner, MD;  [facility];  Service: Orthopedics    REPAIR RECTOCELE      TONSILLECTOMY      TUBAL LIGATION       Family History   Problem Relation Age of Onset    Prostate cancer Father         age at dx Deneise Gilma Prostate cancer Brother 68    Thyroid cancer Brother         age at dx unk    Skin cancer Brother     Thyroid cancer Daughter 39        age at dx unk    Breast cancer Maternal Aunt 71        age at dx unk    Lung cancer Maternal Uncle         age at dx unk    No Known Problems Son     No Known Problems Son     No Known Problems Son     Pancreatic cancer Maternal Aunt         unknown age     Social History     Socioeconomic History    Marital status: /Civil Union     Spouse name: Not on file    Number of children: Not on file    Years of education: Not on file    Highest education level: Not on file   Occupational History    Not on file   Tobacco Use    Smoking status: Never Smoker    Smokeless tobacco: Never Used   Vaping Use    Vaping Use: Never used   Substance and Sexual Activity    Alcohol use: Yes    Drug use: No    Sexual activity: Not on file   Other Topics Concern    Not on file   Social History Narrative    Not on file     Social Determinants of Health     Financial Resource Strain: Not on file   Food Insecurity: Not on file   Transportation Needs: Not on file   Physical Activity: Not on file   Stress: Not on file   Social Connections: Not on file   Intimate Partner Violence: Not on file   Housing Stability: Not on file       Current Outpatient Medications:     acetaminophen (TYLENOL 8 HOUR) 650 mg CR tablet, Take 1 tablet (650 mg total) by mouth every 8 (eight) hours, Disp: 15 tablet, Rfl: 0    amLODIPine (NORVASC) 10 mg tablet, Take 10 mg by mouth daily  , Disp: , Rfl:     calcium citrate-vitamin D (CITRACAL+D) 315-200 MG-UNIT per tablet, Take 1 tablet by mouth daily  , Disp: , Rfl:     furosemide (LASIX) 40 mg tablet, Take 40 mg by mouth daily  , Disp: , Rfl:     Lancets (OneTouch Delica Plus [id number]) MISC, TEST BLOOD GLUCOSE DAILY, Disp: , Rfl:     losartan (COZAAR) 100 MG tablet, Take 100 mg by mouth daily  , Disp: , Rfl:     metFORMIN (GLUCOPHAGE) 1000 MG tablet, Take 1,000 mg by mouth daily with breakfast , Disp: , Rfl:     Multiple Vitamin (MULTIVITAMIN) tablet, Take 1 tablet by mouth daily  , Disp: , Rfl:     OneTouch Verio test strip, use 1 TEST STRIP to TEST BLOOD SUGAR once daily, Disp: , Rfl:     SYNTHROID 88 MCG tablet, , Disp: , Rfl:     doxycycline hyclate (VIBRAMYCIN) 100 mg capsule, Take 100 mg by mouth 2 (two) times a day (Patient not taking: Reported on 11/4/2021), Disp: , Rfl:     lidocaine (LIDODERM) 5 %, Apply 1 patch topically daily for 15 days Remove & Discard patch within 12 hours or as directed by MD (Patient not taking: Reported on 11/4/2021), Disp: 15 patch, Rfl: 0    mupirocin (BACTROBAN) 2 % ointment, APPLY A SMALL AMOUNT TO THE AFFECTED AREA THREE TIMES DAILY (Patient not taking: Reported on 11/4/2021), Disp: , Rfl:     pantoprazole (PROTONIX) 40 mg tablet, Take 1 tablet (40 mg total) by mouth daily in the early morning (Patient not taking: Reported on 11/4/2021), Disp: 30 tablet, Rfl: 0  Allergies   Allergen Reactions    Sulfa Antibiotics Rash    Penicillins Rash       The following portions of the patient's history were reviewed and updated as appropriate: allergies, current medications, past family history, past medical history, past social history, past surgical history and problem list         Vitals:    04/07/22 1102   BP: 132/84   Pulse: 87   Resp: 18   Temp: (!) 97 1 °F (36 2 °C)   SpO2: 98%       Physical Exam  Constitutional:       General: She is not in acute distress  Appearance: She is well-developed  HENT:      Head: Normocephalic and atraumatic  Cardiovascular:      Heart sounds: Normal heart sounds  Pulmonary:      Breath sounds: Normal breath sounds  Chest:   Breasts:      Right: No inverted nipple, mass, nipple discharge, skin change, tenderness, axillary adenopathy or supraclavicular adenopathy  Left: Skin change (Lumpectomy scar) present   No inverted nipple, mass, nipple discharge, tenderness, axillary adenopathy or supraclavicular adenopathy  Abdominal:      Palpations: Abdomen is soft  Lymphadenopathy:      Upper Body:      Right upper body: No supraclavicular, axillary or pectoral adenopathy  Left upper body: No supraclavicular, axillary or pectoral adenopathy  Neurological:      Mental Status: She is alert and oriented to person, place, and time  Psychiatric:         Mood and Affect: Mood normal            Results:  Labs:      Imaging  02/09/2022 bilateral 3D screening mammogram was benign BI-RADS two with a density of two    I reviewed the above imaging data  Discussion/Summary:  58-year-old female status post left breast conservation for an invasive ductal carcinoma  She had partial breast radiation and completed her endocrine therapy  There is no evidence of disease based on exam today  Her recent mammogram was benign  I will plan to see her again in one year following her mammogram or sooner should the need arise

## 2022-06-28 NOTE — RESULT ENCOUNTER NOTE
DERMATOPATHOLOGY RESULT NOTE    Results reviewed by ordering physician  Called patient to personally discuss results  Discussed results with patient  Instructions for Clinical Derm Team:   (remember to route Result Note to appropriate staff):    None, she will think about it    Result & Plan by Specimen:    Specimen A: malignant  Plan: to be decided      Component   Case Report  Surgical Pathology Report                         Case: [id number]                                   Authorizing Provider: Eddie Gandhi MD            Collected:           04/20/2021 1650              Ordering Location:     [facility] Received:            04/20/2021 1651                                     Gap                                                                          Pathologist:           Caroline Lr MD                                                           Specimen:    Skin, Other, A: left upper arm                                                           Final Diagnosis  A  Skin, left upper arm, shave biopsy:     SQUAMOUS CELL CARCINOMA, WELL DIFFERENTIATED, INVASIVE (KERATOACANTHOMA TYPE); transected  Electronically signed by Caroline Lr MD on 4/23/2021 at  3:15 PM  Additional Information   All reported additional testing was performed with appropriately reactive controls   These tests were developed and their performance characteristics determined by [address] [place] [address] or appropriate performing facility, though some tests may be performed on tissues which have not been validated for performance characteristics (such as staining performed on alcohol exposed cell blocks and decalcified tissues)   Results should be interpreted with caution and in the context of the patients' clinical condition  These tests may not be cleared or approved by the [place]  Food and Drug Administration, though the FDA has determined that such clearance or approval is not necessary   These tests are used Department of Anesthesiology  Preprocedure Note       Name:  Guera Salcedo   Age:  78 y.o.  :  1955                                          MRN:  [id number]         Date:  2022      Surgeon: Vianney Krishnamurthy):  Nima Ruano MD    Procedure: Procedure(s):  CYSTOSCOPY URETEROSCOPY LASER STONE BASKET EXTRACTION  LEFT URETERAL STENT PLACEMENT    Medications prior to admission:   Prior to Admission medications    Medication Sig Start Date End Date Taking? Authorizing Provider   omeprazole (PRILOSEC) 20 MG delayed release capsule Take 20 mg by mouth Daily   Yes Historical Provider, MD   HYDROcodone-acetaminophen (NORCO)  MG per tablet Take 2 tablets by mouth 4 times daily as needed for Pain. Yes Historical Provider, MD   pravastatin (PRAVACHOL) 40 MG tablet Take 40 mg by mouth daily   Yes Historical Provider, MD   gabapentin (NEURONTIN) 600 MG tablet Take 600 mg by mouth 4 times daily. Yes Historical Provider, MD   lisinopril-hydroCHLOROthiazide (PRINZIDE;ZESTORETIC) 20-12.5 MG per tablet Take 1 tablet by mouth in the morning and at bedtime   Yes Historical Provider, MD   metoprolol (LOPRESSOR) 100 MG tablet Take 100 mg by mouth 2 times daily   Yes Historical Provider, MD   LORazepam (ATIVAN) 0.5 MG tablet Take 0.5 mg by mouth every 6 hours as needed for Anxiety. Yes Historical Provider, MD   DICLOFENAC SODIUM ER PO Take 75 tablets by mouth in the morning and at bedtime   Yes Historical Provider, MD   clotrimazole-betamethasone (LOTRISONE) 1-0.05 % cream Apply topically 2 times daily Apply topically 2 times daily.    Yes Historical Provider, MD       Current medications:    Current Facility-Administered Medications   Medication Dose Route Frequency Provider Last Rate Last Admin    [MAR Hold] cefepime (MAXIPIME) 2000 mg IVPB minibag in NS  2,000 mg IntraVENous On Call to Wing Ramirez MD        [facility] Hold] diclofenac sodium (VOLTAREN) 1 % gel 2 g  2 g Topical BID MATTHEW Hensley - MEGHNA   2 g at 06/27/22 2132    [MAR Hold] prochlorperazine (COMPAZINE) tablet 5 mg  5 mg Oral Q6H PRN Demetrio Nelson MD   5 mg at 06/25/22 1846    [MAR Hold] melatonin disintegrating tablet 10 mg  10 mg Oral Nightly Tomy Rush APRN - CNP   10 mg at 06/27/22 2130    [MAR Hold] guaiFENesin tablet 400 mg  400 mg Oral Q8H Demetrio Nelson MD   400 mg at 06/28/22 0445    [MAR Hold] sodium chloride (OCEAN, BABY AYR) 0.65 % nasal spray 1 spray  1 spray Each Nostril Q4H PRN Demetrio Nelson MD   1 spray at 06/23/22 1504    [MAR Hold] magnesium sulfate 1000 mg in dextrose 5% 100 mL IVPB  1,000 mg IntraVENous PRN Demetrio Nelson MD        [facility] Hold] potassium chloride (KLOR-CON M) extended release tablet 40 mEq  40 mEq Oral PRN Demetrio Nelson MD   40 mEq at 06/27/22 0507    Or    [MAR Hold] potassium bicarb-citric acid (EFFER-K) effervescent tablet 40 mEq  40 mEq Oral PRN Demetrio Nelson MD        Or   [facility] Hold] potassium chloride 10 mEq/100 mL IVPB (Peripheral Line)  10 mEq IntraVENous PRN Demetrio Nelson MD        [facility] Hold] labetalol (NORMODYNE;TRANDATE) injection 10 mg  10 mg IntraVENous Q4H PRN Shelbi Britton MD   10 mg at 06/19/22 0334    [MAR Hold] lisinopril (PRINIVIL;ZESTRIL) tablet 20 mg  20 mg Oral BID Demetrio Nelson MD   20 mg at 06/27/22 2130    And    [MAR Hold] hydroCHLOROthiazide (HYDRODIURIL) tablet 12.5 mg  12.5 mg Oral BID Demetrio Nelson MD   12.5 mg at 06/27/22 2131    [MAR Hold] metoprolol tartrate (LOPRESSOR) tablet 100 mg  100 mg Oral BID Demetrio Nelson MD   100 mg at 06/27/22 2130    [MAR Hold] levoFLOXacin (LEVAQUIN) 750 MG/150ML infusion 750 mg  750 mg IntraVENous Q24H Nuria Alfaro MD   Stopped at 06/28/22 0550    [MAR Hold] traMADol (ULTRAM) tablet 50 mg  50 mg Oral Q6H PRN Demetrio Nelson MD   50 mg at 06/28/22 0444    [MAR Hold] busPIRone (BUSPAR) tablet 10 mg  10 mg Oral TID Demetrio Nelson MD   10 mg at 06/27/22 2130    [MAR Hold] gabapentin (NEURONTIN) tablet 600 mg  600 mg Oral for clinical purposes and they should not be regarded as investigational or for research  This laboratory has been approved by [facility], designated as a high-complexity laboratory and is qualified to perform these tests  Jay Robles Description     A  The specimen is received in formalin, labeled with the patient's name and hospital number, and is designated "left upper arm skin shave biopsy"  The specimen consists of a tan superficial shaving of skin measuring 0 8 x 0 6 x 0 1 cm  The skin surface exhibits a white-tan keratotic papule measuring 0 7 x 0 6 x 0 1 cm  The margin of resection is inked green  The skin surface is inked red  The specimen is bisected  Entirely submitted  One cassette  Between sponges      Note: The estimated total formalin fixation time based upon information provided by the submitting clinician and the standard processing schedule is under 72 hours      MCrites       Clinical Information   Specimen A: Left upper arm; skin; shave biopsy; 79year old female with 0 6 cm pink papule with keratotic core; keratoacanthoma     ATTN DR Kai Morgan 4x Daily Debi Beebe MD   600 mg at 06/27/22 2130    [MAR Hold] pravastatin (PRAVACHOL) tablet 40 mg  40 mg Oral Daily Debi Beebe MD   40 mg at 06/27/22 0853    [MAR Hold] glucose chewable tablet 16 g  4 tablet Oral PRN MD Snehal Ji Moritz [facility] Hold] dextrose bolus 10% 125 mL  125 mL IntraVENous PRN Dulce Anthony MD        Or   Ardyth Moritz [facility] Hold] dextrose bolus 10% 250 mL  250 mL IntraVENous PRN Dulce Anthony MD        [facility] Hold] glucagon (rDNA) injection 1 mg  1 mg IntraMUSCular PRN Dulce Anthony MD        [facility] Hold] dextrose 5 % solution  100 mL/hr IntraVENous PRN Dulce Anthony MD        [facility] Hold] insulin lispro (HUMALOG) injection vial 0-18 Units  0-18 Units SubCUTAneous TID WC Dulce Anthony MD   3 Units at 06/27/22 1757    [MAR Hold] insulin lispro (HUMALOG) injection vial 0-9 Units  0-9 Units SubCUTAneous Nightly Dulce Anthony MD   2 Units at 06/19/22 2123    [MAR Hold] insulin glargine (LANTUS) injection vial 10 Units  10 Units SubCUTAneous Nightly Dulce Anthony MD   10 Units at 06/27/22 2130    [MAR Hold] sodium chloride flush 0.9 % injection 5-40 mL  5-40 mL IntraVENous 2 times per day Dulce Anthony MD   10 mL at 06/28/22 0807    [MAR Hold] sodium chloride flush 0.9 % injection 5-40 mL  5-40 mL IntraVENous PRN Dulce Anthony MD        [facility] Hold] 0.9 % sodium chloride infusion   IntraVENous PRN Dulce Anthony MD        [facility] Hold] ondansetron (ZOFRAN-ODT) disintegrating tablet 4 mg  4 mg Oral Q8H PRN Dulce Anthony MD   4 mg at 06/21/22 1042    Or    [MAR Hold] ondansetron (ZOFRAN) injection 4 mg  4 mg IntraVENous Q6H PRN Dulce Anthony MD   4 mg at 06/25/22 0815    [MAR Hold] polyethylene glycol (GLYCOLAX) packet 17 g  17 g Oral Daily PRN Dulce Anthony MD        [facility] Hold] acetaminophen (TYLENOL) tablet 650 mg  650 mg Oral Q6H PRN Wallie Olszewski S MD Chi   650 mg at 06/22/22 1448    Or    [MAR Hold] acetaminophen (TYLENOL) suppository 650 mg  650 mg Rectal Q6H PRN Ethyl Soulier, MD        [facility] Hold] potassium chloride 10 mEq/100 mL IVPB (Peripheral Line)  10 mEq IntraVENous PRN Ethyl Soulier, MD        [facility] Hold] magnesium sulfate 2000 mg in 50 mL IVPB premix  2,000 mg IntraVENous PRN Ethyl Soulier, MD   Stopped at 06/16/22 0640    [MAR Hold] ipratropium-albuterol (DUONEB) nebulizer solution 1 ampule  1 ampule Inhalation Q4H WA Ethyl Soulier, MD   1 ampule at 06/28/22 0752    [MAR Hold] lactated ringers infusion   IntraVENous Continuous India Carmichael MD 30 mL/hr at 06/27/22 0805 Rate Verify at 06/27/22 0805       Allergies:     Allergies   Allergen Reactions    Codeine        Problem List:    Patient Active Problem List   Diagnosis Code    Obstructive pyelonephritis N11.1    Sepsis with acute renal failure (HCC) A41.9, R65.20, N17.9    Left ureteral calculus N20.1    Renal calculus, bilateral N20.0    BINH (acute kidney injury) (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 [address] A41.9    Chronic pain syndrome G89.4    Acute respiratory failure with hypoxia ([facility]) J96.01       Past Medical History:        Diagnosis Date    Arthritis     HTN (hypertension)        Past Surgical History:        Procedure Laterality Date    ANKLE FRACTURE SURGERY Right     BLADDER SURGERY Left 6/15/2022    CYSTOSCOPY, LEFT URETEROSCOPY, RETROGRADE PYELOGRAM, STENT INSERTION performed [facility] (CERVIX STATUS UNKNOWN)      INCONTINENCE SURGERY         Social History:    Social History     Tobacco Use    Smoking status: Former Smoker    Smokeless tobacco: Never Used    Tobacco comment: quit 10 years ago   Substance Use Topics    Alcohol use: Never                                Counseling given: Not Answered  Comment: quit 10 years ago      Vital Signs (Current):   Vitals:    06/28/22 0015 06/28/22 0514 06/28/22 0541 06/28/22 0752   BP: (!) 115/59  129/69    Pulse: 75  81    Resp: 18 20 18    Temp: 97.6 °F (36.4 °C)  98.1 °F (36.7 °C)    TempSrc: Oral  Oral    SpO2: 93%  92% 92%   Weight:       Height:                                                  BP Readings from Last 3 Encounters:   06/28/22 129/69       NPO Status: Time of last liquid consumption: 2359                        Time of last solid consumption: 2359                        Date of last liquid consumption: 06/27/22                        Date of last solid food consumption: 06/27/22    BMI:   Wt Readings from Last 3 Encounters:   06/19/22 (!) 348 lb 9 oz (158.1 kg)     Body mass index is 58 kg/m².     CBC:   Lab Results   Component Value Date    WBC 13.2 06/28/2022    RBC 3.72 06/28/2022    HGB 11.0 06/28/2022    HCT 36.1 06/28/2022    MCV 97.0 06/28/2022    RDW 15.9 06/28/2022     06/28/2022       CMP:   Lab Results   Component Value Date     06/28/2022    K 3.9 06/28/2022    K 4.5 06/15/2022    CL 96 06/28/2022    CO2 35 06/28/2022    BUN 20 06/28/2022    CREATININE 0.5 06/28/2022    GFRAA >59 06/28/2022    LABGLOM >60 06/28/2022    GLUCOSE 104 06/28/2022    PROT 5.4 06/28/2022    CALCIUM 7.7 06/28/2022    BILITOT 1.1 06/28/2022    ALKPHOS 139 06/28/2022    AST 35 06/28/2022    ALT 12 06/28/2022       POC Tests:   Recent Labs     06/28/22  0738   POCGLU 109*       Coags:   Lab Results   Component Value Date    PROTIME 17.2 06/15/2022    INR 1.39 06/15/2022       HCG (If Applicable): No results found for: PREGTESTUR, PREGSERUM, HCG, HCGQUANT     ABGs: No results found for: PHART, PO2ART, EAR1SZR, XYE7NBG, BEART, T6CBNEXK     Type & Screen (If Applicable):  No results found for: LABABO, LABRH    Drug/Infectious Status (If Applicable):  No results found for: HIV, HEPCAB    COVID-19 Screening (If Applicable):   Lab Results   Component Value Date    COVID19 Not Detected 06/14/2022           Anesthesia Evaluation  Patient summary reviewed and Nursing notes reviewed no history of anesthetic complications:   Airway: Mallampati: II  TM distance: >3 FB   Neck ROM: full  Mouth opening: > = 3 FB   Dental:    (+) other and poor dentition  Comment: All upper teeth, nubs, some very mobile, pt aware     Pulmonary:Negative Pulmonary ROS and normal exam  breath sounds clear to auscultation  (+) COPD:  sleep apnea:      (-) shortness of breath and not a current smoker          Patient did not smoke on day of surgery. Cardiovascular:    (+) hypertension:, hyperlipidemia    (-) CAD,  angina and  CHF    NYHA Classification: I  ECG reviewed  Rhythm: regular  Rate: normal           Beta Blocker:  Not on Beta Blocker         Neuro/Psych:   Negative Neuro/Psych ROS     (-) seizures, CVA and depression/anxiety            GI/Hepatic/Renal: Neg GI/Hepatic/Renal ROS  (+) renal disease: kidney stones, morbid obesity     (-) hiatal hernia and GERD       Endo/Other: Negative Endo/Other ROS   (+) Diabetes, . Pt had PAT visit. Abdominal:   (+) obese,     Abdomen: soft. Vascular: Other Findings:           Anesthesia Plan      general     ASA 4     (Iv zofran within 30 min of closing   Terrible dental issues, high risk for loss and aspiration, pt aware and agrees to proceed )  Induction: intravenous. BIS  MIPS: Postoperative opioids intended and Prophylactic antiemetics administered. Anesthetic plan and risks discussed with patient. Use of blood products discussed with patient whom. Plan discussed with CRNA.     Attending anesthesiologist reviewed and agrees with Pre Eval content                Sara Roman MD   6/28/2022

## 2023-01-26 ENCOUNTER — OFFICE VISIT (OUTPATIENT)
Dept: DERMATOLOGY | Facility: CLINIC | Age: 83
End: 2023-01-26

## 2023-01-26 VITALS — HEIGHT: 62 IN | TEMPERATURE: 97.9 F | WEIGHT: 228 LBS | BODY MASS INDEX: 41.96 KG/M2

## 2023-01-26 DIAGNOSIS — D18.01 CHERRY ANGIOMA: ICD-10-CM

## 2023-01-26 DIAGNOSIS — D48.5 NEOPLASM OF UNCERTAIN BEHAVIOR OF SKIN: ICD-10-CM

## 2023-01-26 DIAGNOSIS — D22.9 MULTIPLE MELANOCYTIC NEVI: Primary | ICD-10-CM

## 2023-01-26 DIAGNOSIS — L81.4 LENTIGO: ICD-10-CM

## 2023-01-26 DIAGNOSIS — L85.3 XEROSIS OF SKIN: ICD-10-CM

## 2023-01-26 DIAGNOSIS — L82.1 SEBORRHEIC KERATOSIS: ICD-10-CM

## 2023-01-26 NOTE — PATIENT INSTRUCTIONS
MELANOCYTIC NEVI ("Moles")    Assessment and Plan:  Based on a thorough discussion of this condition and the management approach to it (including a comprehensive discussion of the known risks, side effects and potential benefits of treatment), the patient (family) agrees to implement the following specific plan:  When outside we recommend using a wide brim hat, sunglasses, long sleeve and pants, sunscreen with SPF 78+ with reapplication every 2 hours, or SPF specific clothing   Benign, reassured  Annual skin check     Melanocytic Nevi  Melanocytic nevi ("moles") are tan or brown, raised or flat areas of the skin which have an increased number of melanocytes  Melanocytes are the cells in our body which make pigment and account for skin color  Some moles are present at birth (I e , "congenital nevi"), while others come up later in life (i e , "acquired nevi")  The sun can stimulate the body to make more moles  Sunburns are not the only thing that triggers more moles  Chronic sun exposure can do it too  Clinically distinguishing a healthy mole from melanoma may be difficult, even for experienced dermatologists  The "ABCDE's" of moles have been suggested as a means of helping to alert a person to a suspicious mole and the possible increased risk of melanoma  The suggestions for raising alert are as follows:    Asymmetry: Healthy moles tend to be symmetric, while melanomas are often asymmetric  Asymmetry means if you draw a line through the mole, the two halves do not match in color, size, shape, or surface texture  Asymmetry can be a result of rapid enlargement of a mole, the development of a raised area on a previously flat lesion, scaling, ulceration, bleeding or scabbing within the mole  Any mole that starts to demonstrate "asymmetry" should be examined promptly by a board certified dermatologist      Border: Healthy moles tend to have discrete, even borders    The border of a melanoma often blends into the normal skin and does not sharply delineate the mole from normal skin  Any mole that starts to demonstrate "uneven borders" should be examined promptly by a board certified dermatologist      Color: Healthy moles tend to be one color throughout  Melanomas tend to be made up of different colors ranging from dark black, blue, white, or red  Any mole that demonstrates a color change should be examined promptly by a board certified dermatologist      Diameter: Healthy moles tend to be smaller than 0 6 cm in size; an exception are "congenital nevi" that can be larger  Melanomas tend to grow and can often be greater than 0 6 cm (1/4 of an inch, or the size of a pencil eraser)  This is only a guideline, and many normal moles may be larger than 0 6 cm without being unhealthy  Any mole that starts to change in size (small to bigger or bigger to smaller) should be examined promptly by a board certified dermatologist      Evolving: Healthy moles tend to "stay the same "  Melanomas may often show signs of change or evolution such as a change in size, shape, color, or elevation  Any mole that starts to itch, bleed, crust, burn, hurt, or ulcerate or demonstrate a change or evolution should be examined promptly by a board certified dermatologist         Meagan Watkins and Plan:  Based on a thorough discussion of this condition and the management approach to it (including a comprehensive discussion of the known risks, side effects and potential benefits of treatment), the patient (family) agrees to implement the following specific plan:  When outside we recommend using a wide brim hat, sunglasses, long sleeve and pants, sunscreen with SPF 11+ with reapplication every 2 hours, or SPF specific clothing       What is a lentigo? A lentigo is a pigmented flat or slightly raised lesion with a clearly defined edge  Unlike an ephelis (freckle), it does not fade in the winter months  There are several kinds of lentigo    The name lentigo originally referred to its appearance resembling a small lentil  The plural of lentigo is lentigines, although “lentigos” is also in common use  Who gets lentigines? Lentigines can affect males and females of all ages and races  Solar lentigines are especially prevalent in fair skinned adults  Lentigines associated with syndromes are present at birth or arise during childhood  What causes lentigines? Common forms of lentigo are due to exposure to ultraviolet radiation:  Sun damage including sunburn   Indoor tanning   Phototherapy, especially photochemotherapy (PUVA)    Ionizing radiation, eg radiation therapy, can also cause lentigines  Several familial syndromes associated with widespread lentigines originate from mutations in Malik-MAP kinase, mTOR signaling and PTEN pathways  What is the treatment for lentigines? Most lentigines are left alone  Attempts to lighten them may not be successful  The following approaches are used:  SPF 50+ broad-spectrum sunscreen   Hydroquinone bleaching cream   Alpha hydroxy acids   Vitamin C   Retinoids   Azelaic acid   Chemical peels  Individual lesions can be permanently removed using:  Cryotherapy   Intense pulsed light   Pigment lasers    How can lentigines be prevented? Lentigines associated with exposure ultraviolet radiation can be prevented by very careful sun protection  Clothing is more successful at preventing new lentigines than are sunscreens  What is the outlook for lentigines? Lentigines usually persist  They may increase in number with age and sun exposure  Some in sun-protected sites may fade and disappear      ROBLEDO ANGIOMAS    Assessment and Plan:  Based on a thorough discussion of this condition and the management approach to it (including a comprehensive discussion of the known risks, side effects and potential benefits of treatment), the patient (family) agrees to implement the following specific plan:  Monitor for changes  Benign, reassured      Assessment and Plan:    Cherry angioma, also known as [employer] spots, are benign vascular skin lesions  A "cherry angioma" is a firm red, blue or purple papule, 0 1-1 cm in diameter  When thrombosed, they can appear black in colour until evaluated with a dermatoscope when the red or purple colour is more easily seen  Cherry angioma may develop on any part of the body but most often appear on the scalp, face, lips and trunk  An angioma is due to proliferating endothelial cells; these are the cells that line the inside of a blood vessel  Angiomas can arise in early life or later in life; the most common type of angioma is a cherry angioma  Cherry angiomas are very common in males and females of any age or race  They are more noticeable in white skin than in skin of colour  They markedly increase in number from about the age of 36  There may be a family history of similar lesions  Eruptive cherry angiomas have been rarely reported to be associated with internal malignancy  The cause of angiomas is unknown  Genetic analysis of cherry angiomas has shown that they frequently carry specific somatic missense mutations in the GNAQ and GNA11 (Q209H) genes, which are involved in other vascular and melanocytic proliferations  SEBORRHEIC KERATOSIS; NON-INFLAMED    Assessment and Plan:  Based on a thorough discussion of this condition and the management approach to it (including a comprehensive discussion of the known risks, side effects and potential benefits of treatment), the patient (family) agrees to implement the following specific plan:  Monitor for changes  Benign, reassured      Seborrheic Keratosis  A seborrheic keratosis is a harmless warty spot that appears during adult life as a common sign of skin aging  Seborrheic keratoses can arise on any area of skin, covered or uncovered, with the usual exception of the palms and soles  They do not arise from mucous membranes   Seborrheic keratoses can have highly variable appearance  Seborrheic keratoses are extremely common  It has been estimated that over 90% of adults over the age of 61 years have one or more of them  They occur in males and females of all races, typically beginning to erupt in the 35s or 45s  They are uncommon under the age of 21 years  The precise cause of seborrhoeic keratoses is not known  Seborrhoeic keratoses are considered degenerative in nature  As time goes by, seborrheic keratoses tend to become more numerous  Some people inherit a tendency to develop a very large number of them; some people may have hundreds of them  There is no easy way to remove multiple lesions on a single occasion  Unless a specific lesion is "inflamed" and is causing pain or stinging/burning or is bleeding, most insurance companies do not authorize treatment  XEROSIS ("DRY SKIN")    Assessment and Plan:  Based on a thorough discussion of this condition and the management approach to it (including a comprehensive discussion of the known risks, side effects and potential benefits of treatment), the patient (family) agrees to implement the following specific plan:  Use moisturizer like Eucerin,Cerave or Aveeno Cream 3 times a day for the dry skin            Dry skin refers to skin that feels dry to touch  Dry skin has a dull surface with a rough, scaly quality  The skin is less pliable and cracked  When dryness is severe, the skin may become inflamed and fissured  Although any body site can be dry, dry skin tends to affect the shins more than any other site  Dry skin is lacking moisture in the outer horny cell layer (stratum corneum) and this results in cracks in the skin surface  Dry skin is also called xerosis, xeroderma or asteatosis (lack of fat)  It can affect males and females of all ages  There is some racial variability in water and lipid content of the skin    Dry skin that starts in early childhood may be one of about 20 types of ichthyosis (fish-scale skin)  There is often a family history of dry skin  Dry skin is commonly seen in people with atopic dermatitis  Nearly everyone > 60 years has dry skin  Dry skin that begins later may be seen in people with certain diseases and conditions  Postmenopausal women  Hypothyroidism  Chronic renal disease   Malnutrition and weight loss   Subclinical dermatitis   Treatment with certain drugs such as oral retinoids, diuretics and epidermal growth factor receptor inhibitors      What is the treatment for dry skin? The mainstay of treatment of dry skin and ichthyosis is moisturisers/emollients  They should be applied liberally and often enough to:  Reduce itch   Improve the barrier function   Prevent entry of irritants, bacteria   Reduce transepidermal water loss  How can dry skin be prevented? Eliminate aggravating factors:  Reduce the frequency of bathing  A humidifier in winter and air conditioner in summer   Compare having a short shower with a prolonged soak in a bath  Use lukewarm, not hot, water  Replace standard soap with a substitute such as a synthetic detergent cleanser, water-miscible emollient, bath oil, anti-pruritic tar oil, colloidal oatmeal etc    Apply an emollient liberally and often, particularly shortly after bathing, and when itchy  The drier the skin, the thicker this should be, especially on the hands  What is the outlook for dry skin? A tendency to dry skin may persist life-long, or it may improve once contributing factors are controlled  NEOPLASM OF UNCERTAIN BEHAVIOR OF SKIN    Assessment and Plan:  I have discussed with the patient that a sample of skin via a "skin biopsy” would be potentially helpful to further make a specific diagnosis under the microscope    Based on a thorough discussion of this condition and the management approach to it (including a comprehensive discussion of the known risks, side effects and potential benefits of treatment), the patient (family) agrees to implement the following specific plan:    Procedure:  Skin Biopsy  After a thorough discussion of treatment options and risk/benefits/alternatives (including but not limited to local pain, scarring, dyspigmentation, blistering, possible superinfection, and inability to confirm a diagnosis via histopathology), verbal and written consent were obtained and portion of the rash was biopsied for tissue sample  See below for consent that was obtained from patient and subsequent Procedure Note  PROCEDURE TANGENTIAL (SHAVE) BIOPSY NOTE:    After obtaining informed consent  at which time there was a discussion about the purpose of biopsy  and low risks of infection and bleeding  The area was prepped and draped in the usual fashion  Anesthesia was obtained with 1% lidocaine with epinephrine  A shave biopsy to an appropriate sampling depth was obtained by Shave (Dermablade or 15 blade) The resulting wound was covered with surgical ointment and bandaged appropriately  The patient tolerated the procedure well without complications and was without signs of functional compromise  Specimen has been sent for review by Dermatopathology  Standard post-procedure care has been explained and has been included in written form within the patient's copy of Informed Consent  INFORMED CONSENT DISCUSSION AND POST-OPERATIVE INSTRUCTIONS FOR PATIENT    I   RATIONALE FOR PROCEDURE  I understand that a skin biopsy allows the Dermatologist to test a lesion or rash under the microscope to obtain a diagnosis  It usually involves numbing the area with numbing medication and removing a small piece of skin; sometimes the area will be closed with sutures  In this specific procedure, sutures are not usually needed  If any sutures are placed, then they are usually need to be removed in 2 weeks or less      I understand that my Dermatologist recommends that a skin "shave" biopsy be performed today   A local anesthetic, similar to the kind that a dentist uses when filling a cavity, will be injected with a very small needle into the skin area to be sampled  The injected skin and tissue underneath "will go to sleep” and become numb so no pain should be felt afterwards  An instrument shaped like a tiny "razor blade" (shave biopsy instrument) will be used to cut a small piece of tissue and skin from the area so that a sample of tissue can be taken and examined more closely under the microscope  A slight amount of bleeding will occur, but it will be stopped with direct pressure and a pressure bandage and any other appropriate methods  I understands that a scar will form where the wound was created  Surgical ointment will be applied to help protect the wound  Sutures are not usually needed  II   RISKS AND POTENTIAL COMPLICATIONS   I understand the risks and potential complications of a skin biopsy include but are not limited to the following:  Bleeding  Infection  Pain  Scar/keloid  Skin discoloration  Incomplete Removal  Recurrence  Nerve Damage/Numbness/Loss of Function  Allergic Reaction to Anesthesia  Biopsies are diagnostic procedures and based on findings additional treatment or evaluation may be required  Loss or destruction of specimen resulting in no additional findings    My Dermatologist has explained to me the nature of the condition, the nature of the procedure, and the benefits to be reasonably expected compared with alternative approaches  My Dermatologist has discussed the likelihood of major risks or complications of this procedure including the specific risks listed above, such as bleeding, infection, and scarring/keloid  I understand that a scar is expected after this procedure  I understand that my physician cannot predict if the scar will form a "keloid," which extends beyond the borders of the wound that is created  A keloid is a thick, painful, and bumpy scar    A keloid can be difficult to treat, as it does not always respond well to therapy, which includes injecting cortisone directly into the keloid every few weeks  While this usually reduces the pain and size of the scar, it does not eliminate it  I understand that photographs may be taken before and after the procedure  These will be maintained as part of the medical providers confidential records and may not be made available to me  I further authorize the medical provider to use the photographs for teaching purposes or to illustrate scientific papers, books, or lectures if in his/her judgment, medical research, education, or science may benefit from its use  I have had an opportunity to fully inquire about the risks and benefits of this procedure and its alternatives  I have been given ample time and opportunity to ask questions and to seek a second opinion if I wished to do so  I acknowledge that there have specifically been no guarantees as to the cosmetic results from the procedure  I am aware that with any procedure there is always the possibility of an unexpected complication  III  POST-PROCEDURAL CARE (WHAT YOU WILL NEED TO DO "AFTER THE BIOPSY" TO OPTIMIZE HEALING)    Keep the area clean and dry  Try NOT to remove the bandage or get it wet for the first 24 hours  Gently clean the area and apply surgical ointment (such as Vaseline petrolatum ointment, which is available "over the counter" and not a prescription) to the biopsy site for up to 2 weeks straight  This acts to protect the wound from the outside world  Sutures are not usually placed in this procedure  If any sutures were placed, return for suture removal as instructed (generally 1 week for the face, 2 weeks for the body)  Take Acetaminophen (Tylenol) for discomfort, if no contraindications  Ibuprofen or aspirin could make bleeding worse      Call our office immediately for signs of infection: fever, chills, increased redness, warmth, tenderness, discomfort/pain, or pus or foul smell coming from the wound  WHAT TO DO IF THERE IS ANY BLEEDING? If a small amount of bleeding is noticed, place a clean cloth over the area and apply firm pressure for ten minutes  Check the wound after 10 minutes of direct pressure  If bleeding persists, try one more time for an additional 10 minutes of direct pressure on the area  If the bleeding becomes heavier or does not stop after the second attempt, or if you have any other questions about this procedure, then please call your [facility] Dermatologist by calling [phone] (SKIN)  I hereby acknowledge that I have reviewed and verified the site with my Dermatologist and have requested and authorized my Dermatologist to proceed with the procedure

## 2023-01-26 NOTE — PROGRESS NOTES
[facility] Note     Patient Name: Diogo Lind  Encounter Date: 1/26/2023      Have you been cared for by a [place] [id number] Dermatologist in the last 3 years and, if so, which description applies to you? Yes  I have been here within the last 3 years, and my medical history has NOT changed since that time  I am FEMALE/of child-bearing potential     REVIEW OF SYSTEMS:  Have you recently had or currently have any of the following? · No changes in my recent health  PAST MEDICAL HISTORY:  Have you personally ever had or currently have any of the following? If "YES," then please provide more detail  · No changes in my medical history  FAMILY HISTORY:  Any "first degree relatives" (parent, brother, sister, or child) with the following? • No changes in my family's known health  PATIENT EXPERIENCE:    • Do you want the Dermatologist to perform a COMPLETE skin exam today including a clinical examination under the "bra and underwear" areas? Yes  • If necessary, do we have your permission to call and leave a detailed message on your Preferred Phone number that includes your specific medical information? Yes      Allergies   Allergen Reactions   • Sulfa Antibiotics Rash   • Penicillins Rash      Current Outpatient Medications:   •  acetaminophen (TYLENOL 8 HOUR) 650 mg CR tablet, Take 1 tablet (650 mg total) by mouth every 8 (eight) hours, Disp: 15 tablet, Rfl: 0  •  amLODIPine (NORVASC) 10 mg tablet, Take 10 mg by mouth daily  , Disp: , Rfl:   •  calcium citrate-vitamin D (CITRACAL+D) 315-200 MG-UNIT per tablet, Take 1 tablet by mouth daily  , Disp: , Rfl:   •  doxycycline hyclate (VIBRAMYCIN) 100 mg capsule, Take 100 mg by mouth 2 (two) times a day (Patient not taking: Reported on 11/4/2021), Disp: , Rfl:   •  furosemide (LASIX) 40 mg tablet, Take 40 mg by mouth daily  , Disp: , Rfl:   •  Lancets (OneTouch Delica Plus [id number]) MISC, TEST BLOOD GLUCOSE DAILY, Disp: , Rfl:   •  lidocaine (LIDODERM) 5 %, Apply 1 patch topically daily for 15 days Remove & Discard patch within 12 hours or as directed by MD (Patient not taking: Reported on 11/4/2021), Disp: 15 patch, Rfl: 0  •  losartan (COZAAR) 100 MG tablet, Take 100 mg by mouth daily  , Disp: , Rfl:   •  metFORMIN (GLUCOPHAGE) 1000 MG tablet, Take 1,000 mg by mouth daily with breakfast , Disp: , Rfl:   •  Multiple Vitamin (MULTIVITAMIN) tablet, Take 1 tablet by mouth daily  , Disp: , Rfl:   •  mupirocin (BACTROBAN) 2 % ointment, APPLY A SMALL AMOUNT TO THE AFFECTED AREA THREE TIMES DAILY (Patient not taking: Reported on 11/4/2021), Disp: , Rfl:   •  OneTouch Verio test strip, use 1 TEST STRIP to TEST BLOOD SUGAR once daily, Disp: , Rfl:   •  pantoprazole (PROTONIX) 40 mg tablet, Take 1 tablet (40 mg total) by mouth daily in the early morning (Patient not taking: Reported on 11/4/2021), Disp: 30 tablet, Rfl: 0  •  SYNTHROID 88 MCG tablet, , Disp: , Rfl:           • Whom besides the patient is providing clinical information about today's encounter?   o NO ADDITIONAL HISTORIAN (patient alone provided history)    Physical Exam and Assessment/Plan by Diagnosis:    MELANOCYTIC NEVI ("Moles")    Physical Exam:  • Anatomic Location Affected:   Mostly on sun-exposed areas of the trunk and extremities  • A; Right temple; 0 3 X 0 2 cm gray blue macule; same size as noted 11/4/21  Continue to monitor  • Morphological Description:  Scattered, 1-4mm round to ovoid, symmetrical-appearing, even bordered, skin colored to dark brown macules/papules, mostly in sun-exposed areas  • Pertinent Positives:  • Pertinent Negatives:     Additional History of Present Condition:      Assessment and Plan:  Based on a thorough discussion of this condition and the management approach to it (including a comprehensive discussion of the known risks, side effects and potential benefits of treatment), the patient (family) agrees to implement the following specific plan:  • When outside we recommend using a wide brim hat, sunglasses, long sleeve and pants, sunscreen with SPF 78+ with reapplication every 2 hours, or SPF specific clothing   • Benign, reassured  • Annual skin check     Melanocytic Nevi  Melanocytic nevi ("moles") are tan or brown, raised or flat areas of the skin which have an increased number of melanocytes  Melanocytes are the cells in our body which make pigment and account for skin color  Some moles are present at birth (I e , "congenital nevi"), while others come up later in life (i e , "acquired nevi")  The sun can stimulate the body to make more moles  Sunburns are not the only thing that triggers more moles  Chronic sun exposure can do it too  Clinically distinguishing a healthy mole from melanoma may be difficult, even for experienced dermatologists  The "ABCDE's" of moles have been suggested as a means of helping to alert a person to a suspicious mole and the possible increased risk of melanoma  The suggestions for raising alert are as follows:    Asymmetry: Healthy moles tend to be symmetric, while melanomas are often asymmetric  Asymmetry means if you draw a line through the mole, the two halves do not match in color, size, shape, or surface texture  Asymmetry can be a result of rapid enlargement of a mole, the development of a raised area on a previously flat lesion, scaling, ulceration, bleeding or scabbing within the mole  Any mole that starts to demonstrate "asymmetry" should be examined promptly by a board certified dermatologist      Border: Healthy moles tend to have discrete, even borders  The border of a melanoma often blends into the normal skin and does not sharply delineate the mole from normal skin  Any mole that starts to demonstrate "uneven borders" should be examined promptly by a board certified dermatologist      Color: Healthy moles tend to be one color throughout    Melanomas tend to be made up of different colors ranging from dark black, blue, white, or red  Any mole that demonstrates a color change should be examined promptly by a board certified dermatologist      Diameter: Healthy moles tend to be smaller than 0 6 cm in size; an exception are "congenital nevi" that can be larger  Melanomas tend to grow and can often be greater than 0 6 cm (1/4 of an inch, or the size of a pencil eraser)  This is only a guideline, and many normal moles may be larger than 0 6 cm without being unhealthy  Any mole that starts to change in size (small to bigger or bigger to smaller) should be examined promptly by a board certified dermatologist      Evolving: Healthy moles tend to "stay the same "  Melanomas may often show signs of change or evolution such as a change in size, shape, color, or elevation  Any mole that starts to itch, bleed, crust, burn, hurt, or ulcerate or demonstrate a change or evolution should be examined promptly by a board certified dermatologist         LENTIGO    Physical Exam:  • Anatomic Location Affected:  Trunk and bilateral upper extremities   • Morphological Description:  Light brown macules  • Pertinent Positives:  • Pertinent Negatives: Additional History of Present Condition:      Assessment and Plan:  Based on a thorough discussion of this condition and the management approach to it (including a comprehensive discussion of the known risks, side effects and potential benefits of treatment), the patient (family) agrees to implement the following specific plan:  • When outside we recommend using a wide brim hat, sunglasses, long sleeve and pants, sunscreen with SPF 60+ with reapplication every 2 hours, or SPF specific clothing       What is a lentigo? A lentigo is a pigmented flat or slightly raised lesion with a clearly defined edge  Unlike an ephelis (freckle), it does not fade in the winter months  There are several kinds of lentigo  The name lentigo originally referred to its appearance resembling a small lentil   The plural of lentigo is lentigines, although “lentigos” is also in common use  Who gets lentigines? Lentigines can affect males and females of all ages and races  Solar lentigines are especially prevalent in fair skinned adults  Lentigines associated with syndromes are present at birth or arise during childhood  What causes lentigines? Common forms of lentigo are due to exposure to ultraviolet radiation:  • Sun damage including sunburn   • Indoor tanning   • Phototherapy, especially photochemotherapy (PUVA)    Ionizing radiation, eg radiation therapy, can also cause lentigines  Several familial syndromes associated with widespread lentigines originate from mutations in Malik-MAP kinase, mTOR signaling and PTEN pathways  What is the treatment for lentigines? Most lentigines are left alone  Attempts to lighten them may not be successful  The following approaches are used:  • SPF 50+ broad-spectrum sunscreen   • Hydroquinone bleaching cream   • Alpha hydroxy acids   • Vitamin C   • Retinoids   • Azelaic acid   • Chemical peels  Individual lesions can be permanently removed using:  • Cryotherapy   • Intense pulsed light   • Pigment lasers    How can lentigines be prevented? Lentigines associated with exposure ultraviolet radiation can be prevented by very careful sun protection  Clothing is more successful at preventing new lentigines than are sunscreens  What is the outlook for lentigines? Lentigines usually persist  They may increase in number with age and sun exposure  Some in sun-protected sites may fade and disappear  ROBLEDO ANGIOMAS    Physical Exam:  • Anatomic Location Affected:  trunk  • Morphological Description:  Scattered cherry red, 1-4 mm papules  • Pertinent Positives:  • Pertinent Negatives:     Additional History of Present Condition:      Assessment and Plan:  Based on a thorough discussion of this condition and the management approach to it (including a comprehensive discussion of the known risks, side effects and potential benefits of treatment), the patient (family) agrees to implement the following specific plan:  • Monitor for changes  • Benign, reassured  •     Assessment and Plan:    Cherry angioma, also known as [employer] spots, are benign vascular skin lesions  A "cherry angioma" is a firm red, blue or purple papule, 0 1-1 cm in diameter  When thrombosed, they can appear black in colour until evaluated with a dermatoscope when the red or purple colour is more easily seen  Cherry angioma may develop on any part of the body but most often appear on the scalp, face, lips and trunk  An angioma is due to proliferating endothelial cells; these are the cells that line the inside of a blood vessel  Angiomas can arise in early life or later in life; the most common type of angioma is a cherry angioma  Cherry angiomas are very common in males and females of any age or race  They are more noticeable in white skin than in skin of colour  They markedly increase in number from about the age of 36  There may be a family history of similar lesions  Eruptive cherry angiomas have been rarely reported to be associated with internal malignancy  The cause of angiomas is unknown  Genetic analysis of cherry angiomas has shown that they frequently carry specific somatic missense mutations in the GNAQ and GNA11 (Q209H) genes, which are involved in other vascular and melanocytic proliferations  SEBORRHEIC KERATOSIS; NON-INFLAMED    Physical Exam:  • Anatomic Location Affected:  Trunk, face  • Morphological Description:  Flat and raised, waxy, smooth to warty textured, yellow to brownish-grey to dark brown to blackish, discrete, "stuck-on" appearing papules  • Pertinent Positives:  • Pertinent Negatives:     Additional History of Present Condition:      Assessment and Plan:  Based on a thorough discussion of this condition and the management approach to it (including a comprehensive discussion of the known risks, side effects and potential benefits of treatment), the patient (family) agrees to implement the following specific plan:  • Monitor for changes  • Benign, reassured  •     Seborrheic Keratosis  A seborrheic keratosis is a harmless warty spot that appears during adult life as a common sign of skin aging  Seborrheic keratoses can arise on any area of skin, covered or uncovered, with the usual exception of the palms and soles  They do not arise from mucous membranes  Seborrheic keratoses can have highly variable appearance  Seborrheic keratoses are extremely common  It has been estimated that over 90% of adults over the age of 61 years have one or more of them  They occur in males and females of all races, typically beginning to erupt in the 35s or 45s  They are uncommon under the age of 21 years  The precise cause of seborrhoeic keratoses is not known  Seborrhoeic keratoses are considered degenerative in nature  As time goes by, seborrheic keratoses tend to become more numerous  Some people inherit a tendency to develop a very large number of them; some people may have hundreds of them  There is no easy way to remove multiple lesions on a single occasion  Unless a specific lesion is "inflamed" and is causing pain or stinging/burning or is bleeding, most insurance companies do not authorize treatment  XEROSIS ("DRY SKIN")    Physical Exam:  • Anatomic Location Affected:  diffuse  • Morphological Description:  xerosis  • Pertinent Positives:  • Pertinent Negatives:     Additional History of Present Condition:      Assessment and Plan:  Based on a thorough discussion of this condition and the management approach to it (including a comprehensive discussion of the known risks, side effects and potential benefits of treatment), the patient (family) agrees to implement the following specific plan:  • Use moisturizer like Eucerin,Cerave or Aveeno Cream 3 times a day for the dry skin   •   • •     Dry skin refers to skin that feels dry to touch  Dry skin has a dull surface with a rough, scaly quality  The skin is less pliable and cracked  When dryness is severe, the skin may become inflamed and fissured  Although any body site can be dry, dry skin tends to affect the shins more than any other site  Dry skin is lacking moisture in the outer horny cell layer (stratum corneum) and this results in cracks in the skin surface  Dry skin is also called xerosis, xeroderma or asteatosis (lack of fat)  It can affect males and females of all ages  There is some racial variability in water and lipid content of the skin  • Dry skin that starts in early childhood may be one of about 20 types of ichthyosis (fish-scale skin)  There is often a family history of dry skin  • Dry skin is commonly seen in people with atopic dermatitis  • Nearly everyone > 60 years has dry skin  Dry skin that begins later may be seen in people with certain diseases and conditions  • Postmenopausal women  • Hypothyroidism  • Chronic renal disease   • Malnutrition and weight loss   • Subclinical dermatitis   • Treatment with certain drugs such as oral retinoids, diuretics and epidermal growth factor receptor inhibitors      What is the treatment for dry skin? The mainstay of treatment of dry skin and ichthyosis is moisturisers/emollients  They should be applied liberally and often enough to:  • Reduce itch   • Improve the barrier function   • Prevent entry of irritants, bacteria   • Reduce transepidermal water loss  How can dry skin be prevented? Eliminate aggravating factors:  • Reduce the frequency of bathing  • A humidifier in winter and air conditioner in summer   • Compare having a short shower with a prolonged soak in a bath  • Use lukewarm, not hot, water     • Replace standard soap with a substitute such as a synthetic detergent cleanser, water-miscible emollient, bath oil, anti-pruritic tar oil, colloidal oatmeal etc    • Apply an emollient liberally and often, particularly shortly after bathing, and when itchy  The drier the skin, the thicker this should be, especially on the hands  What is the outlook for dry skin? A tendency to dry skin may persist life-long, or it may improve once contributing factors are controlled  NEOPLASM OF UNCERTAIN BEHAVIOR OF SKIN    Physical Exam:  • (Anatomic Location); (Size and Morphological Description); (Differential Diagnosis):  o A; Right forearm; 0 4 cm crusted pink papule; Diff Dx: AK VS SCC  • Pertinent Positives:  • Pertinent Negatives: Additional History of Present Condition:  Found on exam    Assessment and Plan:  • I have discussed with the patient that a sample of skin via a "skin biopsy” would be potentially helpful to further make a specific diagnosis under the microscope  • Based on a thorough discussion of this condition and the management approach to it (including a comprehensive discussion of the known risks, side effects and potential benefits of treatment), the patient (family) agrees to implement the following specific plan:    o Procedure:  Skin Biopsy  After a thorough discussion of treatment options and risk/benefits/alternatives (including but not limited to local pain, scarring, dyspigmentation, blistering, possible superinfection, and inability to confirm a diagnosis via histopathology), verbal and written consent were obtained and portion of the rash was biopsied for tissue sample  See below for consent that was obtained from patient and subsequent Procedure Note  PROCEDURE TANGENTIAL (SHAVE) BIOPSY NOTE:    • Performing Physician: Odette Jackson   • Anatomic Location; Clinical Description with size (cm); Pre-Op Diagnosis:   A; Right forearm; 0 4 cm crusted pink papule; Diff Dx:  AK VS SCC  • Post-op diagnosis: Same     • Local anesthesia: 1% Lidocaine HCL     • Topical anesthesia: None    • Hemostasis: Electrocautery  and Aluminum chloride After obtaining informed consent  at which time there was a discussion about the purpose of biopsy  and low risks of infection and bleeding  The area was prepped and draped in the usual fashion  Anesthesia was obtained with 1% lidocaine with epinephrine  A shave biopsy to an appropriate sampling depth was obtained by Shave (Dermablade or 15 blade) The resulting wound was covered with surgical ointment and bandaged appropriately  The patient tolerated the procedure well without complications and was without signs of functional compromise  Specimen has been sent for review by Dermatopathology  Standard post-procedure care has been explained and has been included in written form within the patient's copy of Informed Consent  INFORMED CONSENT DISCUSSION AND POST-OPERATIVE INSTRUCTIONS FOR PATIENT    I   RATIONALE FOR PROCEDURE  I understand that a skin biopsy allows the Dermatologist to test a lesion or rash under the microscope to obtain a diagnosis  It usually involves numbing the area with numbing medication and removing a small piece of skin; sometimes the area will be closed with sutures  In this specific procedure, sutures are not usually needed  If any sutures are placed, then they are usually need to be removed in 2 weeks or less  I understand that my Dermatologist recommends that a skin "shave" biopsy be performed today  A local anesthetic, similar to the kind that a dentist uses when filling a cavity, will be injected with a very small needle into the skin area to be sampled  The injected skin and tissue underneath "will go to sleep” and become numb so no pain should be felt afterwards  An instrument shaped like a tiny "razor blade" (shave biopsy instrument) will be used to cut a small piece of tissue and skin from the area so that a sample of tissue can be taken and examined more closely under the microscope    A slight amount of bleeding will occur, but it will be stopped with direct pressure and a pressure bandage and any other appropriate methods  I understands that a scar will form where the wound was created  Surgical ointment will be applied to help protect the wound  Sutures are not usually needed  II   RISKS AND POTENTIAL COMPLICATIONS   I understand the risks and potential complications of a skin biopsy include but are not limited to the following:  • Bleeding  • Infection  • Pain  • Scar/keloid  • Skin discoloration  • Incomplete Removal  • Recurrence  • Nerve Damage/Numbness/Loss of Function  • Allergic Reaction to Anesthesia  • Biopsies are diagnostic procedures and based on findings additional treatment or evaluation may be required  • Loss or destruction of specimen resulting in no additional findings    My Dermatologist has explained to me the nature of the condition, the nature of the procedure, and the benefits to be reasonably expected compared with alternative approaches  My Dermatologist has discussed the likelihood of major risks or complications of this procedure including the specific risks listed above, such as bleeding, infection, and scarring/keloid  I understand that a scar is expected after this procedure  I understand that my physician cannot predict if the scar will form a "keloid," which extends beyond the borders of the wound that is created  A keloid is a thick, painful, and bumpy scar  A keloid can be difficult to treat, as it does not always respond well to therapy, which includes injecting cortisone directly into the keloid every few weeks  While this usually reduces the pain and size of the scar, it does not eliminate it  I understand that photographs may be taken before and after the procedure  These will be maintained as part of the medical providers confidential records and may not be made available to me    I further authorize the medical provider to use the photographs for teaching purposes or to illustrate scientific papers, books, or lectures if in his/her judgment, medical research, education, or science may benefit from its use  I have had an opportunity to fully inquire about the risks and benefits of this procedure and its alternatives  I have been given ample time and opportunity to ask questions and to seek a second opinion if I wished to do so  I acknowledge that there have specifically been no guarantees as to the cosmetic results from the procedure  I am aware that with any procedure there is always the possibility of an unexpected complication  III  POST-PROCEDURAL CARE (WHAT YOU WILL NEED TO DO "AFTER THE BIOPSY" TO OPTIMIZE HEALING)    • Keep the area clean and dry  Try NOT to remove the bandage or get it wet for the first 24 hours  • Gently clean the area and apply surgical ointment (such as Vaseline petrolatum ointment, which is available "over the counter" and not a prescription) to the biopsy site for up to 2 weeks straight  This acts to protect the wound from the outside world  • Sutures are not usually placed in this procedure  If any sutures were placed, return for suture removal as instructed (generally 1 week for the face, 2 weeks for the body)  • Take Acetaminophen (Tylenol) for discomfort, if no contraindications  Ibuprofen or aspirin could make bleeding worse  • Call our office immediately for signs of infection: fever, chills, increased redness, warmth, tenderness, discomfort/pain, or pus or foul smell coming from the wound  WHAT TO DO IF THERE IS ANY BLEEDING? If a small amount of bleeding is noticed, place a clean cloth over the area and apply firm pressure for ten minutes  Check the wound after 10 minutes of direct pressure  If bleeding persists, try one more time for an additional 10 minutes of direct pressure on the area    If the bleeding becomes heavier or does not stop after the second attempt, or if you have any other questions about this procedure, then please call your [facility] by calling [phone] (SKIN)  I hereby acknowledge that I have reviewed and verified the site with my Dermatologist and have requested and authorized my Dermatologist to proceed with the procedure            Scribe Attestation    I,:  Consuelo Xiong am acting as a scribe while in the presence of the attending physician :       I,:  Mercy Quarles MD personally performed the services described in this documentation    as scribed in my presence :

## 2023-02-07 ENCOUNTER — OFFICE VISIT (OUTPATIENT)
Dept: DERMATOLOGY | Facility: CLINIC | Age: 83
End: 2023-02-07

## 2023-02-07 VITALS — BODY MASS INDEX: 41.96 KG/M2 | WEIGHT: 228 LBS | HEIGHT: 62 IN | TEMPERATURE: 97.6 F

## 2023-02-07 DIAGNOSIS — L57.0 KERATOSIS, ACTINIC: Primary | ICD-10-CM

## 2023-02-07 NOTE — PATIENT INSTRUCTIONS
Assessment and Plan:  Based on a thorough discussion of this condition and the management approach to it (including a comprehensive discussion of the known risks, side effects and potential benefits of treatment), the patient (family) agrees to implement the following specific plan:    Cryotherapy done in office today     Actinic keratoses are very common on sites repeatedly exposed to the sun, especially the backs of the hands and the face, most often affecting the ears, nose, cheeks, upper lip, vermilion of the lower lip, temples, forehead and balding scalp  In severely chronically sun-damaged individuals, they may also be found on the upper trunk, upper and lower limbs, and dorsum of feet  We discussed the theoretical premalignant (“pre-cancerous”) nature and etiology of these growths  We discussed the prevailing notion that actinic keratoses are a reflection of abnormal skin cell development due to DNA damage by short wavelength UVB  They are more likely to appear if the immune function is poor, due to aging, recent sun exposure, predisposing disease or certain drugs  We discussed that the main concern is that actinic keratoses may predispose to squamous cell carcinoma  It is rare for a solitary actinic keratosis to evolve to squamous cell carcinoma (SCC), but the risk of SCC occurring at some stage in a patient with more than 10 actinic keratoses is thought to be about 10 to 15%  A tender, thickened, ulcerated or enlarging actinic keratosis is suspicious of SCC  Actinic keratoses may be prevented by strict sun protection  If already present, keratoses may improve with a very high sun protection factor (50+) broad-spectrum sunscreen applied at least daily to affected areas, year-round  We recommend that UPF-rated clothing and hats and sunglasses be worn whenever possible and that a sunscreen-moisturizer combination product such as Neutrogena Daily Defense be applied at least three times a day      We performed a thorough discussion of treatment options and specific risk/benefits/alternatives including but not limited to medical “field” treatment with medications such as the following:    Topical “field area” medications such as 5-fluorouracil or Aldara (specifically, the trouble with long-term compliance, blistering and local skin reaction versus the convenience of at-home therapy and that field therapy “gets what is not yet seen”)  Cryotherapy (specifically, local pain, scarring, dyspigmentation, blistering, possible superinfection, and treats “only what we see” versus directed treatment today)  Photodynamic therapy (specifically, local pain, scarring, dyspigmentation, blistering, possible superinfection, need to schedule for a later date, and time spent in the office versus field therapy that “gets what is not yet seen”)

## 2023-02-07 NOTE — PROGRESS NOTES
[facility] Note     Patient Name: Basim Jade  Encounter Date: 02/07/2023    Have you been cared for by a [facility] in the last 3 years and, if so, which description applies to you? Yes  I have been here within the last 3 years, and my medical history has NOT changed since that time  I am FEMALE/of child-bearing potential     REVIEW OF SYSTEMS:  Have you recently had or currently have any of the following? · No changes in my recent health  PAST MEDICAL HISTORY:  Have you personally ever had or currently have any of the following? If "YES," then please provide more detail  · No changes in my medical history  FAMILY HISTORY:  Any "first degree relatives" (parent, brother, sister, or child) with the following? • No changes in my family's known health  PATIENT EXPERIENCE:    • Do you want the Dermatologist to perform a COMPLETE skin exam today including a clinical examination under the "bra and underwear" areas? NO  • If necessary, do we have your permission to call and leave a detailed message on your Preferred Phone number that includes your specific medical information? Yes      Allergies   Allergen Reactions   • Sulfa Antibiotics Rash   • Penicillins Rash      Current Outpatient Medications:   •  acetaminophen (TYLENOL 8 HOUR) 650 mg CR tablet, Take 1 tablet (650 mg total) by mouth every 8 (eight) hours, Disp: 15 tablet, Rfl: 0  •  amLODIPine (NORVASC) 10 mg tablet, Take 10 mg by mouth daily  , Disp: , Rfl:   •  calcium citrate-vitamin D (CITRACAL+D) 315-200 MG-UNIT per tablet, Take 1 tablet by mouth daily  , Disp: , Rfl:   •  furosemide (LASIX) 40 mg tablet, Take 40 mg by mouth daily  , Disp: , Rfl:   •  Lancets (OneTouch Delica Plus [id number]) MISC, TEST BLOOD GLUCOSE DAILY, Disp: , Rfl:   •  losartan (COZAAR) 100 MG tablet, Take 100 mg by mouth daily  , Disp: , Rfl:   •  metFORMIN (GLUCOPHAGE) 1000 MG tablet, Take 1,000 mg by mouth daily with breakfast , Disp: , Rfl:   •  Multiple Vitamin (MULTIVITAMIN) tablet, Take 1 tablet by mouth daily  , Disp: , Rfl:   •  OneTouch Verio test strip, use 1 TEST STRIP to TEST BLOOD SUGAR once daily, Disp: , Rfl:   •  SYNTHROID 88 MCG tablet, , Disp: , Rfl:   •  doxycycline hyclate (VIBRAMYCIN) 100 mg capsule, Take 100 mg by mouth 2 (two) times a day (Patient not taking: Reported on 11/4/2021), Disp: , Rfl:   •  lidocaine (LIDODERM) 5 %, Apply 1 patch topically daily for 15 days Remove & Discard patch within 12 hours or as directed by MD (Patient not taking: Reported on 11/4/2021), Disp: 15 patch, Rfl: 0  •  mupirocin (BACTROBAN) 2 % ointment, APPLY A SMALL AMOUNT TO THE AFFECTED AREA THREE TIMES DAILY (Patient not taking: No sig reported), Disp: , Rfl:   •  pantoprazole (PROTONIX) 40 mg tablet, Take 1 tablet (40 mg total) by mouth daily in the early morning (Patient not taking: Reported on 11/4/2021), Disp: 30 tablet, Rfl: 0          • Whom besides the patient is providing clinical information about today's encounter?   o NO ADDITIONAL HISTORIAN (patient alone provided history)    Physical Exam and Assessment/Plan by Diagnosis:    ACTINIC KERATOSIS    Physical Exam:  • Anatomic Location Affected:  Right forearm   • Morphological Description:  Scaly pink papules    Additional History of Present Condition:  Patient had biopsy and results came back as AK     Assessment and Plan:  Based on a thorough discussion of this condition and the management approach to it (including a comprehensive discussion of the known risks, side effects and potential benefits of treatment), the patient (family) agrees to implement the following specific plan:    • Cryotherapy done in office today     Actinic keratoses are very common on sites repeatedly exposed to the sun, especially the backs of the hands and the face, most often affecting the ears, nose, cheeks, upper lip, vermilion of the lower lip, temples, forehead and balding scalp   In severely chronically sun-damaged individuals, they may also be found on the upper trunk, upper and lower limbs, and dorsum of feet  We discussed the theoretical premalignant (“pre-cancerous”) nature and etiology of these growths  We discussed the prevailing notion that actinic keratoses are a reflection of abnormal skin cell development due to DNA damage by short wavelength UVB  They are more likely to appear if the immune function is poor, due to aging, recent sun exposure, predisposing disease or certain drugs  We discussed that the main concern is that actinic keratoses may predispose to squamous cell carcinoma  It is rare for a solitary actinic keratosis to evolve to squamous cell carcinoma (SCC), but the risk of SCC occurring at some stage in a patient with more than 10 actinic keratoses is thought to be about 10 to 15%  A tender, thickened, ulcerated or enlarging actinic keratosis is suspicious of SCC  Actinic keratoses may be prevented by strict sun protection  If already present, keratoses may improve with a very high sun protection factor (50+) broad-spectrum sunscreen applied at least daily to affected areas, year-round  We recommend that UPF-rated clothing and hats and sunglasses be worn whenever possible and that a sunscreen-moisturizer combination product such as Neutrogena Daily Defense be applied at least three times a day  We performed a thorough discussion of treatment options and specific risk/benefits/alternatives including but not limited to medical “field” treatment with medications such as the following:    • Topical “field area” medications such as 5-fluorouracil or Aldara (specifically, the trouble with long-term compliance, blistering and local skin reaction versus the convenience of at-home therapy and that field therapy “gets what is not yet seen”)      • Cryotherapy (specifically, local pain, scarring, dyspigmentation, blistering, possible superinfection, and treats “only what we see” versus directed treatment today)  • Photodynamic therapy (specifically, local pain, scarring, dyspigmentation, blistering, possible superinfection, need to schedule for a later date, and time spent in the office versus field therapy that “gets what is not yet seen”)  PROCEDURE:  DESTRUCTION OF PRE-MALIGNANT LESIONS  After a thorough discussion of treatment options and risk/benefits/alternatives (including but not limited to local pain, scarring, dyspigmentation, blistering, and possible superinfection), verbal and written consent were obtained and the aforementioned lesions were treated on with cryotherapy using liquid nitrogen x 1 cycle for 5-10 seconds  • TOTAL NUMBER of 1 pre-malignant lesions were treated today on the ANATOMIC LOCATION: right forearm   The patient tolerated the procedure well, and after-care instructions were provided      Scribe Attestation    I,:  Jorje Trejo MA am acting as a scribe while in the presence of the attending physician :       I,:  Connie Diaz MD personally performed the services described in this documentation    as scribed in my presence :

## 2023-02-08 NOTE — RESULT ENCOUNTER NOTE
DERMATOPATHOLOGY RESULT NOTE    Results reviewed by ordering physician    Reviewed in clinic, LN2 on 2/7/23      Instructions for Clinical Derm Team:   (remember to route Result Note to appropriate staff):    None    Result & Plan by Specimen:    Specimen A: benign  Plan: clinic appointment for liquid nitrogen

## 2023-02-10 ENCOUNTER — HOSPITAL ENCOUNTER (OUTPATIENT)
Dept: MAMMOGRAPHY | Facility: HOSPITAL | Age: 83
Discharge: HOME | End: 2023-02-10
Attending: SURGERY

## 2023-02-10 VITALS — HEIGHT: 62 IN | BODY MASS INDEX: 41.96 KG/M2 | WEIGHT: 228 LBS

## 2023-02-10 DIAGNOSIS — Z12.31 SCREENING MAMMOGRAM, ENCOUNTER FOR: ICD-10-CM

## 2024-02-12 ENCOUNTER — HOSPITAL ENCOUNTER (OUTPATIENT)
Dept: MAMMOGRAPHY | Facility: HOSPITAL | Age: 84
Discharge: HOME | End: 2024-02-12
Attending: SURGERY
Payer: MEDICARE

## 2024-02-12 VITALS — WEIGHT: 224 LBS | HEIGHT: 62 IN | BODY MASS INDEX: 41.22 KG/M2

## 2024-02-12 DIAGNOSIS — Z12.31 SCREENING MAMMOGRAM, ENCOUNTER FOR: ICD-10-CM

## 2024-02-12 PROCEDURE — 77063 BREAST TOMOSYNTHESIS BI: CPT

## 2024-02-12 PROCEDURE — 77067 SCR MAMMO BI INCL CAD: CPT

## 2024-02-13 DIAGNOSIS — R92.8 ABNORMAL MAMMOGRAM OF RIGHT BREAST: Primary | ICD-10-CM

## 2024-03-13 ENCOUNTER — HOSPITAL ENCOUNTER (OUTPATIENT)
Dept: ULTRASOUND IMAGING | Facility: CLINIC | Age: 84
Discharge: HOME | End: 2024-03-13
Payer: MEDICARE

## 2024-03-13 ENCOUNTER — HOSPITAL ENCOUNTER (OUTPATIENT)
Dept: MAMMOGRAPHY | Facility: CLINIC | Age: 84
Discharge: HOME | End: 2024-03-13
Payer: MEDICARE

## 2024-03-13 VITALS — BODY MASS INDEX: 41.22 KG/M2 | HEIGHT: 62 IN | WEIGHT: 224 LBS

## 2024-03-13 DIAGNOSIS — R92.8 ABNORMAL MAMMOGRAM OF RIGHT BREAST: ICD-10-CM

## 2024-03-13 PROCEDURE — 76642 ULTRASOUND BREAST LIMITED: CPT

## 2024-03-13 PROCEDURE — G0279 TOMOSYNTHESIS, MAMMO: HCPCS

## 2024-03-13 PROCEDURE — 77065 DX MAMMO INCL CAD UNI: CPT

## 2024-05-23 ENCOUNTER — TELEPHONE (OUTPATIENT)
Dept: HEMATOLOGY ONCOLOGY | Facility: CLINIC | Age: 84
End: 2024-05-23

## 2024-05-23 NOTE — TELEPHONE ENCOUNTER
Appointment Change  Cancel, Reschedule, Change to Virtual      Who are you speaking with? Patient   If it is not the patient, is the caller listed on the communication consent form? N/A   Which provider is the appointment scheduled with? Dr. Rodriguez   When was the original appointment scheduled?    Please list date and time 06/13/2024 @ 2:15PM    At which location is the appointment scheduled to take place? [place]   Was the appointment rescheduled?     Was the appointment changed from an in person visit to a virtual visit?    If so, please list the details of the change. Yes, 07/11/2024 @8AM    What is the reason for the appointment change? Scheduling conflict

## 2024-07-11 ENCOUNTER — OFFICE VISIT (OUTPATIENT)
Dept: SURGICAL ONCOLOGY | Facility: CLINIC | Age: 84
End: 2024-07-11
Payer: MEDICARE

## 2024-07-11 VITALS
OXYGEN SATURATION: 95 % | SYSTOLIC BLOOD PRESSURE: 126 MMHG | WEIGHT: 221 LBS | BODY MASS INDEX: 40.67 KG/M2 | DIASTOLIC BLOOD PRESSURE: 86 MMHG | HEIGHT: 62 IN | HEART RATE: 61 BPM

## 2024-07-11 DIAGNOSIS — R92.8 ABNORMAL MAMMOGRAM OF RIGHT BREAST: ICD-10-CM

## 2024-07-11 DIAGNOSIS — Z85.3 ENCOUNTER FOR FOLLOW-UP SURVEILLANCE OF BREAST CANCER: Primary | ICD-10-CM

## 2024-07-11 DIAGNOSIS — Z08 ENCOUNTER FOR FOLLOW-UP SURVEILLANCE OF BREAST CANCER: Primary | ICD-10-CM

## 2024-07-11 DIAGNOSIS — Z85.3 PERSONAL HISTORY OF BREAST CANCER: ICD-10-CM

## 2024-07-11 PROCEDURE — 99213 OFFICE O/P EST LOW 20 MIN: CPT | Performed by: SURGERY

## 2024-07-11 NOTE — PROGRESS NOTES
Surgical Oncology Follow Up       [address] [facility]  [address] [facility] [address]    Tanya Cassidy  1940  [id number]  [address] [facility]  [address] [facility]  [facility] [address]    Chief Complaint   Patient presents with    Follow-up     Yearly Follow Up       Assessment & Plan   Diagnoses and all orders for this visit:    Encounter for follow-up surveillance of breast cancer    Personal history of breast cancer    Abnormal mammogram of right breast        Advance Care Planning/Advance Directives:  Discussed disease status, cancer treatment plans and/or cancer treatment goals with the patient.     Oncology History:    Oncology History   Personal history of breast cancer   1/11/2012 Initial Diagnosis    Adenocarcinoma of breast (HCC)     1/16/2012 Surgery    Left breast biopsy , IDC     2/17/2012 Surgery    Left partial mastectomy, SLNB     3/19/2012 -  Radiation    PBRT  Dr Huizar,  Dr Mckee     4/2012 -  Hormone Therapy    Anastrazole, Letrazole.   Dr Barron.  Pt d/c due to joint aches.         History of Present Illness: Breast cancer follow-up, denies any current breast referable concerns  -Interval History: Stable imaging in March    Review of Systems:  Review of Systems   Constitutional: Negative.  Negative for appetite change, fever and unexpected weight change.   HENT: Negative.  Negative for trouble swallowing.    Eyes: Negative.    Respiratory: Negative.  Negative for cough and shortness of breath.    Cardiovascular: Negative.  Negative for chest pain.   Gastrointestinal: Negative.  Negative for abdominal pain, nausea and vomiting.   Endocrine: Negative.    Genitourinary: Negative.  Negative for dysuria.   Musculoskeletal: Negative.  Negative for arthralgias and myalgias.   Skin: Negative.    Allergic/Immunologic: Negative.    Neurological: Negative.  Negative for  headaches.   Hematological: Negative.  Negative for adenopathy. Does not bruise/bleed easily.   Psychiatric/Behavioral: Negative.         Patient Active Problem List   Diagnosis    Personal history of breast cancer    Screening mammogram, encounter for    S/P trigger finger release    Intrinsic muscle tightness    Encounter for follow-up surveillance of breast cancer    Arthritis    Diabetes mellitus (HCC)    GERD (gastroesophageal reflux disease)    Disease of thyroid gland    Hypertension    Postural dizziness with near syncope    Fall    Hypokalemia    Transaminitis    Obstructive sleep apnea    Right shoulder pain    Traumatic complete tear of right rotator cuff    Traumatic tear of supraspinatus tendon of left shoulder    Abnormal mammogram of right breast     Past Medical History:   Diagnosis Date    Arthritis     Diabetes mellitus (HCC)     Disease of thyroid gland     GERD (gastroesophageal reflux disease)     Headache     Hemorrhoids     Hx of radiation therapy 03/09/2012    PBRT    Hypertension     Hypertension     Night sweats     Rheumatoid arthritis (HCC)     Rotator cuff injury     Trigger finger     right and left hand    Urinary incontinence     Use of anastrozole (Arimidex)     Use of letrozole (Femara)      Past Surgical History:   Procedure Laterality Date    ABDOMINAL SURGERY      APPENDECTOMY      BREAST BIOPSY Left 01/162012    IDC    BREAST LUMPECTOMY Left 02/17/2012    BREAST SURGERY      CATARACT EXTRACTION      CHOLECYSTECTOMY      FOOT SURGERY      right and left foot    HYSTERECTOMY      JOINT REPLACEMENT      KNEE SURGERY      WI TENDON SHEATH INCISION Right 08/07/2018    Procedure: RELEASE TRIGGER FINGER - LONG FINGER AND RING FINGER;  Surgeon: Domingo Meyers MD;  Location: [facility];  Service: Orthopedics    WI TENDON SHEATH INCISION Left 08/14/2018    Procedure: RELEASE TRIGGER FINGER - LEFT INDEX FINGER;  Surgeon: Domingo Meyers MD;  Location: [facility];  Service:  Orthopedics    REPAIR RECTOCELE      TONSILLECTOMY      TUBAL LIGATION      US GUIDED THYROID BIOPSY  03/30/2017     Family History   Problem Relation Age of Onset    Prostate cancer Father         age at dx unk    Prostate cancer Brother 73    Thyroid cancer Brother         age at dx unk    Skin cancer Brother     Thyroid cancer Daughter 45        age at dx unk    Breast cancer Maternal Aunt 69        age at dx unk    Lung cancer Maternal Uncle         age at dx unk    No Known Problems Son     No Known Problems Son     No Known Problems Son     Pancreatic cancer Maternal Aunt         unknown age     Social History     Socioeconomic History    Marital status: /Civil Union     Spouse name: Not on file    Number of children: Not on file    Years of education: Not on file    Highest education level: Not on file   Occupational History    Not on file   Tobacco Use    Smoking status: Never    Smokeless tobacco: Never   Vaping Use    Vaping status: Never Used   Substance and Sexual Activity    Alcohol use: Yes    Drug use: No    Sexual activity: Not on file   Other Topics Concern    Not on file   Social History Narrative    Not on file     Social Determinants of Health     Financial Resource Strain: Not on file   Food Insecurity: Not on file   Transportation Needs: Not on file   Physical Activity: Not on file   Stress: Not on file   Social Connections: Not on file   Intimate Partner Violence: Not on file   Housing Stability: Not on file       Current Outpatient Medications:     acetaminophen (TYLENOL 8 HOUR) 650 mg CR tablet, Take 1 tablet (650 mg total) by mouth every 8 (eight) hours (Patient taking differently: Take 650 mg by mouth every 8 (eight) hours as needed for moderate pain), Disp: 15 tablet, Rfl: 0    amLODIPine (NORVASC) 10 mg tablet, Take 10 mg by mouth daily., Disp: , Rfl:     calcium citrate-vitamin D (CITRACAL+D) 315-200 MG-UNIT per tablet, Take 1 tablet by mouth daily., Disp: , Rfl:     furosemide  (LASIX) 40 mg tablet, Take 40 mg by mouth daily., Disp: , Rfl:     Lancets (OneTouch Delica Plus [id number]) MISC, TEST BLOOD GLUCOSE DAILY, Disp: , Rfl:     losartan (COZAAR) 100 MG tablet, Take 100 mg by mouth daily., Disp: , Rfl:     metFORMIN (GLUCOPHAGE) 1000 MG tablet, Take 1,000 mg by mouth daily with breakfast , Disp: , Rfl:     Multiple Vitamin (MULTIVITAMIN) tablet, Take 1 tablet by mouth daily., Disp: , Rfl:     OneTouch Verio test strip, use 1 TEST STRIP to TEST BLOOD SUGAR once daily, Disp: , Rfl:     SYNTHROID 88 MCG tablet, , Disp: , Rfl:     doxycycline hyclate (VIBRAMYCIN) 100 mg capsule, Take 100 mg by mouth 2 (two) times a day (Patient not taking: Reported on 11/4/2021), Disp: , Rfl:     lidocaine (LIDODERM) 5 %, Apply 1 patch topically daily for 15 days Remove & Discard patch within 12 hours or as directed by MD (Patient not taking: Reported on 11/4/2021), Disp: 15 patch, Rfl: 0    mupirocin (BACTROBAN) 2 % ointment, APPLY A SMALL AMOUNT TO THE AFFECTED AREA THREE TIMES DAILY (Patient not taking: No sig reported), Disp: , Rfl:     pantoprazole (PROTONIX) 40 mg tablet, Take 1 tablet (40 mg total) by mouth daily in the early morning (Patient not taking: Reported on 11/4/2021), Disp: 30 tablet, Rfl: 0  Allergies   Allergen Reactions    Sulfa Antibiotics Rash    Penicillins Rash       The following portions of the patient's history were reviewed and updated as appropriate: allergies, current medications, past family history, past medical history, past social history, past surgical history, and problem list.        Vitals:    07/11/24 0811   BP: 126/86   Pulse: 61   SpO2: 95%       Physical Exam  Constitutional:       General: She is not in acute distress.     Appearance: Normal appearance. She is well-developed.   HENT:      Head: Normocephalic and atraumatic.   Cardiovascular:      Heart sounds: Normal heart sounds.   Pulmonary:      Breath sounds: Normal breath sounds.   Chest:   Breasts:     Right:  No swelling, bleeding, inverted nipple, mass, nipple discharge, skin change or tenderness.      Left: Skin change (lumpectomy scar) present. No swelling, bleeding, inverted nipple, mass, nipple discharge or tenderness.   Abdominal:      Palpations: Abdomen is soft.   Musculoskeletal:      Right lower leg: No edema.      Left lower leg: No edema.   Lymphadenopathy:      Upper Body:      Right upper body: No supraclavicular, axillary or pectoral adenopathy.      Left upper body: No supraclavicular, axillary or pectoral adenopathy.   Neurological:      Mental Status: She is alert and oriented to person, place, and time.   Psychiatric:         Mood and Affect: Mood normal.           Results:  Labs:      Imaging  3/13/2024 right 3D diagnostic mammogram and ultrasound there is a stable asymmetry less defined with likely a complex cyst on ultrasound, additional short-term follow-up is recommended    I reviewed the above imaging data.    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  She had radiation therapy, partial breast, she also completed 5 years of an aromatase inhibitor.  Is no evidence of disease based on exam today.  She is being followed for an asymmetry in the right breast.  Her recent imaging is stable.  She is already scheduled for her bilateral mammogram in the fall.  Provided there are no concerns, I will see her again in 1 year or sooner should the need arise.

## 2024-09-19 ENCOUNTER — HOSPITAL ENCOUNTER (OUTPATIENT)
Dept: MAMMOGRAPHY | Facility: CLINIC | Age: 84
Discharge: HOME | End: 2024-09-19
Payer: MEDICARE

## 2024-09-19 ENCOUNTER — HOSPITAL ENCOUNTER (OUTPATIENT)
Dept: ULTRASOUND IMAGING | Facility: CLINIC | Age: 84
Discharge: HOME | End: 2024-09-19
Payer: MEDICARE

## 2024-09-19 DIAGNOSIS — R92.8 ABNORMAL FINDINGS ON DIAGNOSTIC IMAGING OF BREAST: ICD-10-CM

## 2024-09-19 PROCEDURE — G0279 TOMOSYNTHESIS, MAMMO: HCPCS

## 2024-09-19 PROCEDURE — 76642 ULTRASOUND BREAST LIMITED: CPT

## 2024-09-19 PROCEDURE — 77065 DX MAMMO INCL CAD UNI: CPT

## 2024-09-20 NOTE — PROGRESS NOTES
Met with patient and   regarding recommendation for;    __x___ RIGHT ______LEFT      ___x__Ultrasound guided  ______Stereotactic breast biopsy.      __X___Verbalized understanding.  x  Reviewed clip placement with patient, pt states understanding: Yes: ____ No: ____  Comments:    Blood thinners:  No: x_____ Yes: ______ What:          Biopsy teaching sheet given:  Yes: ___X___ No: ________    Pt given contact information and adv to call with any questions/needs

## 2024-09-24 ENCOUNTER — TELEPHONE (OUTPATIENT)
Age: 84
End: 2024-09-24

## 2024-09-24 NOTE — TELEPHONE ENCOUNTER
FYI:  Pt called wanting to know if Dr Rodriguez agrees with pt having breast bx done based on recent findings on Mammo/US. Informed pt that the bx will help give Dr Rodriguez further information to better make a plan for the pt based on those findings.Pt understands and will go to the bx appt as sched for 10/8 at 11 AM at [facility].

## 2024-10-08 ENCOUNTER — HOSPITAL ENCOUNTER (OUTPATIENT)
Dept: MAMMOGRAPHY | Facility: CLINIC | Age: 84
Discharge: HOME | End: 2024-10-08
Payer: MEDICARE

## 2024-10-08 ENCOUNTER — HOSPITAL ENCOUNTER (OUTPATIENT)
Dept: ULTRASOUND IMAGING | Facility: CLINIC | Age: 84
Discharge: HOME | End: 2024-10-08
Payer: MEDICARE

## 2024-10-08 VITALS — HEART RATE: 83 BPM | DIASTOLIC BLOOD PRESSURE: 88 MMHG | SYSTOLIC BLOOD PRESSURE: 150 MMHG

## 2024-10-08 DIAGNOSIS — R92.8 ABNORMAL MAMMOGRAM: ICD-10-CM

## 2024-10-08 PROCEDURE — A4648 IMPLANTABLE TISSUE MARKER: HCPCS

## 2024-10-08 PROCEDURE — 38505 NEEDLE BIOPSY LYMPH NODES: CPT

## 2024-10-08 PROCEDURE — 19083 BX BREAST 1ST LESION US IMAG: CPT

## 2024-10-08 PROCEDURE — 76942 ECHO GUIDE FOR BIOPSY: CPT

## 2024-10-08 PROCEDURE — 88342 IMHCHEM/IMCYTCHM 1ST ANTB: CPT | Performed by: STUDENT IN AN ORGANIZED HEALTH CARE EDUCATION/TRAINING PROGRAM

## 2024-10-08 PROCEDURE — 88341 IMHCHEM/IMCYTCHM EA ADD ANTB: CPT | Performed by: STUDENT IN AN ORGANIZED HEALTH CARE EDUCATION/TRAINING PROGRAM

## 2024-10-08 PROCEDURE — 88360 TUMOR IMMUNOHISTOCHEM/MANUAL: CPT | Performed by: STUDENT IN AN ORGANIZED HEALTH CARE EDUCATION/TRAINING PROGRAM

## 2024-10-08 PROCEDURE — 88305 TISSUE EXAM BY PATHOLOGIST: CPT | Performed by: STUDENT IN AN ORGANIZED HEALTH CARE EDUCATION/TRAINING PROGRAM

## 2024-10-08 RX ORDER — LIDOCAINE HYDROCHLORIDE 10 MG/ML
5 INJECTION, SOLUTION EPIDURAL; INFILTRATION; INTRACAUDAL; PERINEURAL ONCE
Status: COMPLETED | OUTPATIENT
Start: 2024-10-08 | End: 2024-10-08

## 2024-10-08 RX ADMIN — LIDOCAINE HYDROCHLORIDE 5 ML: 10 INJECTION, SOLUTION EPIDURAL; INFILTRATION; INTRACAUDAL; PERINEURAL at 11:12

## 2024-10-08 RX ADMIN — LIDOCAINE HYDROCHLORIDE 5 ML: 10 INJECTION, SOLUTION EPIDURAL; INFILTRATION; INTRACAUDAL; PERINEURAL at 11:08

## 2024-10-08 NOTE — PROGRESS NOTES
Patient arrived via:    __x___ambulatory    _____wheelchair    _____stretcher      Domestic violence screen    ___x___negative______positive    Breast Implants:    _______yes _____x___no

## 2024-10-08 NOTE — PROGRESS NOTES
Ice pack given:    __x___yes _____no    Discharge instructions reviewed & given to patient:    __x___yes _____no    Discharged via:    __x___ambulatory    _____wheelchair    _____stretcher    Stable on discharge:    __x___yes ____no

## 2024-10-08 NOTE — PROGRESS NOTES
Procedure type: Site 1    __x___ultrasound guided _____stereotactic    Breast:    _____Left ___x__Right    Location: 9 o'clock 3cmfn    Needle: 12 gauge    # of passes: 3    Clip: Jessica        Procedure type: Site 2    __x___ultrasound guided _____stereotactic    Breast:    _____Left __x___Right    Location: Axilla    Needle: 6 gauge    # of passes: 3    Clip: Jessica    Performed by: Dr. Benitez    Pressure held for 5 minutes by:  Selene Marlow Strips:    __x___yes _____no    Band aid:    ___x__yes_____no    Tape and guaze:    _____yes ___x__no    Tolerated procedure:    ___x__yes _____no

## 2024-10-09 NOTE — PROGRESS NOTES
Post procedure call completed    Bleeding: _____yes __X___no    Pain: _____yes ___X___no    Redness/Swelling: ______yes ___X___no    Band aid removed: _____yes ___X__no (discussed removing when she showers)    Steri-Strips intact: ___X___yes _____no (discussed with patient to remove steri strips on  10/13/2024 if they have not come off on their own)    Pt with no questions at this time, adv will call when results available, adv to call with any questions or concerns, has name/# for contact

## 2024-10-10 PROCEDURE — 88342 IMHCHEM/IMCYTCHM 1ST ANTB: CPT | Performed by: STUDENT IN AN ORGANIZED HEALTH CARE EDUCATION/TRAINING PROGRAM

## 2024-10-10 PROCEDURE — 88360 TUMOR IMMUNOHISTOCHEM/MANUAL: CPT | Performed by: STUDENT IN AN ORGANIZED HEALTH CARE EDUCATION/TRAINING PROGRAM

## 2024-10-10 PROCEDURE — 88305 TISSUE EXAM BY PATHOLOGIST: CPT | Performed by: STUDENT IN AN ORGANIZED HEALTH CARE EDUCATION/TRAINING PROGRAM

## 2024-10-10 PROCEDURE — 88341 IMHCHEM/IMCYTCHM EA ADD ANTB: CPT | Performed by: STUDENT IN AN ORGANIZED HEALTH CARE EDUCATION/TRAINING PROGRAM

## 2024-10-11 ENCOUNTER — DOCUMENTATION (OUTPATIENT)
Dept: HEMATOLOGY ONCOLOGY | Facility: CLINIC | Age: 84
End: 2024-10-11

## 2024-10-11 NOTE — PROGRESS NOTES
Breast Cancer Nurse Navigator    Chart reviewed for prepping for initial outreach by nurse navigator.    Nurse Navigator and Patient Navigator added to care team    Diagnosis: Right Papillary     Recent Imagin2024 Mammo screening bilateral w 3d & cad  2024 Mammo diagnostic right w 3d & cad/US breast [facility] limited (diagnostic)  2024 Mammo diagnostic right w 3d & cad/ US breast [facility] limited complete    Radiology and pathology are concordant.  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 no     Other: Patient is established with surg onc for a history of left IDC s/p lumpectomy & SLNB with  partial radiation and adjuvant HT in       Nurse Navigator will call patient for initial outreach.      Will have Patient Navigator outreach patient after surgical oncology consultation.  Any clinical questions to be directed to office nurse.

## 2024-10-11 NOTE — PROGRESS NOTES
All records needed are in patients chart. No records retrieval needed at this time.     Referral received/ Chart reviewed for work up completed     Imaging completed: [facility]   [] PET/CT   [] MRI   [] CT   [x] US   [x] Mammo   [] Bone scan   [] N/A    Pathology completed:    Date: 10/08/2024   Location:[facility]   []N/A    Additional records needed:   [] Genomic report   [] Genetic testing results   [] Office Note   [] Procedure/ Operative note   [] Lab results   [x] N/A      [] Radiation Oncology records retrieval needed (PN to route to rad/onc clerical pool once scheduled)  Date:  Location:

## 2024-10-21 ENCOUNTER — PATIENT OUTREACH (OUTPATIENT)
Dept: HEMATOLOGY ONCOLOGY | Facility: CLINIC | Age: 84
End: 2024-10-21

## 2024-10-21 NOTE — PROGRESS NOTES
Breast Oncology Nurse Navigator    Called patient for initial outreach from nurse navigator.  Introduced myself and my role as well as members of the navigation team.  Oncology Nurse Navigator will follow patient until they are seen by surgical oncology, after which the patient navigator initiate outreach and follow the patient to survivorship.      Referral received from Dr Rodriguez  Breast cancer diagnosis was made after an abnormal mammogram followed by a US guided right breast biopsy.   Patient states an understanding/awareness of diagnosis    Confirmed upcoming appointment with Dr. Rodriguez, including date, time and location.  Patient will be accompanied by her  and daughter  Provided brief explanation of what to expect at this visit. She is an established patient of Dr Rodriguez's. She has a history of left IDC s/p lumpectomy & SLNB with partial radiation and adjuvant hormone therapy in 2012   Patient has transportation to appointments.    Patient lives with her   Support system includes: her  and daughter  Referral placed to oncology social worker: no    Referral to oncology genetics: no patient declined.   Discussed the Cancer Support Community and some of the programs and services offered  Discussed Breast Cancer Support group that meets monthly at [facility].  Information sent via [employer].    Patient has my contact information and knows she can reach out with questions.  Office hours provided.  Patient provided with the phone number for [phone].  General assessment completed.  Patient does not have [employer] set up  [employer] message sent with our team's contact information.

## 2024-10-24 PROBLEM — D05.11: Status: ACTIVE | Noted: 2024-10-24

## 2024-11-04 PROBLEM — R92.8 ABNORMAL MAMMOGRAM OF RIGHT BREAST: Status: RESOLVED | Noted: 2024-02-13 | Resolved: 2024-11-04

## 2024-11-04 PROBLEM — Z85.3 ENCOUNTER FOR FOLLOW-UP SURVEILLANCE OF BREAST CANCER: Status: RESOLVED | Noted: 2019-02-28 | Resolved: 2024-11-04

## 2024-11-04 PROBLEM — Z08 ENCOUNTER FOR FOLLOW-UP SURVEILLANCE OF BREAST CANCER: Status: RESOLVED | Noted: 2019-02-28 | Resolved: 2024-11-04

## 2024-11-04 PROBLEM — Z12.31 SCREENING MAMMOGRAM, ENCOUNTER FOR: Status: RESOLVED | Noted: 2018-02-22 | Resolved: 2024-11-04

## 2024-11-07 ENCOUNTER — APPOINTMENT (OUTPATIENT)
Dept: LAB | Facility: CLINIC | Age: 84
End: 2024-11-07
Payer: MEDICARE

## 2024-11-07 ENCOUNTER — TELEPHONE (OUTPATIENT)
Dept: SURGICAL ONCOLOGY | Facility: CLINIC | Age: 84
End: 2024-11-07

## 2024-11-07 ENCOUNTER — TELEPHONE (OUTPATIENT)
Dept: GENETICS | Facility: CLINIC | Age: 84
End: 2024-11-07

## 2024-11-07 ENCOUNTER — OFFICE VISIT (OUTPATIENT)
Dept: SURGICAL ONCOLOGY | Facility: CLINIC | Age: 84
End: 2024-11-07
Payer: MEDICARE

## 2024-11-07 VITALS
DIASTOLIC BLOOD PRESSURE: 86 MMHG | OXYGEN SATURATION: 98 % | WEIGHT: 226 LBS | HEIGHT: 62 IN | BODY MASS INDEX: 41.59 KG/M2 | SYSTOLIC BLOOD PRESSURE: 136 MMHG | TEMPERATURE: 98.2 F | RESPIRATION RATE: 16 BRPM | HEART RATE: 86 BPM

## 2024-11-07 DIAGNOSIS — E89.1 POSTPROCEDURAL HYPOINSULINEMIA: ICD-10-CM

## 2024-11-07 DIAGNOSIS — D05.11 INTRADUCTAL PAPILLARY CARCINOMA, RIGHT: ICD-10-CM

## 2024-11-07 DIAGNOSIS — Z85.3 HISTORY OF LEFT BREAST CANCER: ICD-10-CM

## 2024-11-07 DIAGNOSIS — Z80.3 FAMILY HISTORY OF MALIGNANT NEOPLASM OF BREAST: ICD-10-CM

## 2024-11-07 DIAGNOSIS — D05.11 INTRADUCTAL PAPILLARY CARCINOMA, RIGHT: Primary | ICD-10-CM

## 2024-11-07 DIAGNOSIS — C50.912 MALIGNANT NEOPLASM OF LEFT FEMALE BREAST, UNSPECIFIED ESTROGEN RECEPTOR STATUS, UNSPECIFIED SITE OF BREAST (HCC): ICD-10-CM

## 2024-11-07 DIAGNOSIS — Z80.0 FAMILY HISTORY OF MALIGNANT NEOPLASM OF GASTROINTESTINAL TRACT: ICD-10-CM

## 2024-11-07 DIAGNOSIS — Z80.42 FAMILY HISTORY OF MALIGNANT NEOPLASM OF PROSTATE: ICD-10-CM

## 2024-11-07 DIAGNOSIS — Z80.3 FAMILY HISTORY OF BREAST CANCER: ICD-10-CM

## 2024-11-07 LAB
ALBUMIN SERPL BCG-MCNC: 4.4 G/DL (ref 3.5–5)
ALP SERPL-CCNC: 80 U/L (ref 34–104)
ALT SERPL W P-5'-P-CCNC: 42 U/L (ref 7–52)
ANION GAP SERPL CALCULATED.3IONS-SCNC: 10 MMOL/L (ref 4–13)
AST SERPL W P-5'-P-CCNC: 40 U/L (ref 13–39)
BASOPHILS # BLD AUTO: 0.06 THOUSANDS/ÂΜL (ref 0–0.1)
BASOPHILS NFR BLD AUTO: 1 % (ref 0–1)
BILIRUB SERPL-MCNC: 0.52 MG/DL (ref 0.2–1)
BILIRUB UR QL STRIP: NEGATIVE
BUN SERPL-MCNC: 16 MG/DL (ref 5–25)
CALCIUM SERPL-MCNC: 9.9 MG/DL (ref 8.4–10.2)
CHLORIDE SERPL-SCNC: 101 MMOL/L (ref 96–108)
CLARITY UR: CLEAR
CO2 SERPL-SCNC: 28 MMOL/L (ref 21–32)
COLOR UR: NORMAL
CREAT SERPL-MCNC: 0.91 MG/DL (ref 0.6–1.3)
EOSINOPHIL # BLD AUTO: 0.09 THOUSAND/ÂΜL (ref 0–0.61)
EOSINOPHIL NFR BLD AUTO: 1 % (ref 0–6)
ERYTHROCYTE [DISTWIDTH] IN BLOOD BY AUTOMATED COUNT: 14.2 % (ref 11.6–15.1)
EST. AVERAGE GLUCOSE BLD GHB EST-MCNC: 157 MG/DL
GFR SERPL CREATININE-BSD FRML MDRD: 57 ML/MIN/1.73SQ M
GLUCOSE SERPL-MCNC: 126 MG/DL (ref 65–140)
GLUCOSE UR STRIP-MCNC: NEGATIVE MG/DL
HBA1C MFR BLD: 7.1 %
HCT VFR BLD AUTO: 42.7 % (ref 34.8–46.1)
HGB BLD-MCNC: 14.4 G/DL (ref 11.5–15.4)
HGB UR QL STRIP.AUTO: NEGATIVE
IMM GRANULOCYTES # BLD AUTO: 0.03 THOUSAND/UL (ref 0–0.2)
IMM GRANULOCYTES NFR BLD AUTO: 0 % (ref 0–2)
KETONES UR STRIP-MCNC: NEGATIVE MG/DL
LEUKOCYTE ESTERASE UR QL STRIP: NEGATIVE
LYMPHOCYTES # BLD AUTO: 3.41 THOUSANDS/ÂΜL (ref 0.6–4.47)
LYMPHOCYTES NFR BLD AUTO: 35 % (ref 14–44)
MCH RBC QN AUTO: 30.8 PG (ref 26.8–34.3)
MCHC RBC AUTO-ENTMCNC: 33.7 G/DL (ref 31.4–37.4)
MCV RBC AUTO: 91 FL (ref 82–98)
MONOCYTES # BLD AUTO: 0.63 THOUSAND/ÂΜL (ref 0.17–1.22)
MONOCYTES NFR BLD AUTO: 7 % (ref 4–12)
NEUTROPHILS # BLD AUTO: 5.51 THOUSANDS/ÂΜL (ref 1.85–7.62)
NEUTS SEG NFR BLD AUTO: 56 % (ref 43–75)
NITRITE UR QL STRIP: NEGATIVE
NRBC BLD AUTO-RTO: 0 /100 WBCS
PH UR STRIP.AUTO: 5 [PH]
PLATELET # BLD AUTO: 267 THOUSANDS/UL (ref 149–390)
PMV BLD AUTO: 9.8 FL (ref 8.9–12.7)
POTASSIUM SERPL-SCNC: 3.4 MMOL/L (ref 3.5–5.3)
PROT SERPL-MCNC: 7.6 G/DL (ref 6.4–8.4)
PROT UR STRIP-MCNC: NEGATIVE MG/DL
RBC # BLD AUTO: 4.68 MILLION/UL (ref 3.81–5.12)
SODIUM SERPL-SCNC: 139 MMOL/L (ref 135–147)
SP GR UR STRIP.AUTO: 1.01 (ref 1–1.03)
UROBILINOGEN UR STRIP-ACNC: <2 MG/DL
WBC # BLD AUTO: 9.73 THOUSAND/UL (ref 4.31–10.16)

## 2024-11-07 PROCEDURE — 80053 COMPREHEN METABOLIC PANEL: CPT

## 2024-11-07 PROCEDURE — 99215 OFFICE O/P EST HI 40 MIN: CPT | Performed by: SURGERY

## 2024-11-07 PROCEDURE — 36415 COLL VENOUS BLD VENIPUNCTURE: CPT

## 2024-11-07 PROCEDURE — 81003 URINALYSIS AUTO W/O SCOPE: CPT

## 2024-11-07 PROCEDURE — 83036 HEMOGLOBIN GLYCOSYLATED A1C: CPT

## 2024-11-07 PROCEDURE — 85025 COMPLETE CBC W/AUTO DIFF WBC: CPT

## 2024-11-07 RX ORDER — TRAMADOL HYDROCHLORIDE 50 MG/1
50 TABLET ORAL EVERY 6 HOURS PRN
Qty: 9 TABLET | Refills: 0 | Status: SHIPPED | OUTPATIENT
Start: 2024-11-07

## 2024-11-07 RX ORDER — ACETAMINOPHEN 10 MG/ML
1000 INJECTION, SOLUTION INTRAVENOUS
OUTPATIENT
Start: 2024-11-07 | End: 2024-11-08

## 2024-11-07 NOTE — PROGRESS NOTES
Surgical Oncology Follow Up       [facility] ONCOL[facility]Y [facility]  [address] [facility] [place] [address]    Tanya HOWARD Khanh  1940  [id number]  [facility] SURGICAL ONCOLOGY [facility]ORACIO  [address] [facility]  [facility] [address]    Chief Complaint   Patient presents with    New Patient Visit       Assessment & Plan   Diagnoses and all orders for this visit:    Intraductal papillary carcinoma, right  -     Case request operating room: RIGHT KRISTIN LOCALIZED LUMPECTOMY WITH SENTINEL LYMPH NODE BIOPSY; Standing  -     UA w Reflex to Microscopic w Reflex to Culture; Future  -     Comprehensive metabolic panel; Future  -     CBC and differential; Future  -     XR chest pa and lateral; Future  -     EKG 12 lead; Future  -     HEMOGLOBIN A1C W/ EAG ESTIMATION; Future  -     NM lymphatic breast; Future  -     Case request operating room: RIGHT KRISTIN LOCALIZED LUMPECTOMY WITH SENTINEL LYMPH NODE BIOPSY  -     traMADol (Ultram) 50 mg tablet; Take 1 tablet (50 mg total) by mouth every 6 (six) hours as needed for severe pain    History of left breast cancer    Family history of breast cancer    Postprocedural hypoinsulinemia  -     HEMOGLOBIN A1C W/ EAG ESTIMATION; Future    Other orders  -     Incentive spirometry; Standing  -     Insert and maintain IV line; Standing  -     Void On-Call to O.R.; Standing  -     Place sequential compression device; Standing  -     Reason for no Pharmacologic VTE Prophylaxis; Standing  -     acetaminophen (Ofirmev) injection 1,000 mg  -     ceFAZolin (ANCEF) 2,000 mg in dextrose 5 % 100 mL IVPB        Advance Care Planning/Advance Directives:  Discussed disease status, cancer treatment plans and/or cancer treatment goals with the patient.     Oncology History:    Oncology History   History of left breast cancer   1/16/2012 Biopsy    Left breast stereotactic biopsy  Invasive ductal carcinoma  Grade 1  , ND  100, HER2 2+ FISH negative     2/17/2012 Surgery    Left lumpectomy with SLN biopsy (Dr. Rodriguez)  Invasive ductal carcinoma  4 mm  0/1 Lymph node     3/19/2012 -  Radiation    PBRT  Dr Huizar,  Dr Mckee     4/2012 - 12/2013 Hormone Therapy    Anastrozole 4/2012 - 8/2013  Letrozole 8/2013 - 12/2013 (D/C due to joint aches)  Dr Barron     Intraductal papillary carcinoma, right   10/8/2024 Biopsy    Right breast ultrasound-guided biopsy  A. 9 o'clock, 3 cm from nipple (KRISTIN)  Papillary carcinoma  Grade 2  Invasion is not identified in this sample; favored to represent an encapsulated papillary carcinoma, however, complete classification should be reserved for excisional sample    B. Right axillary lymph node (KRISTIN)  One benign lymph node, negative for carcinoma    Concordant. Right malignancy appears unifocal; suspicious masses cover an area up to 6 mm on US. Right axilla negative after benign biopsy. Left breast clear on 2/2024 screening mammo.      10/8/2024 -  Cancer Staged    Staging form: Breast, AJCC 8th Edition  - Clinical stage from 10/8/2024: Stage 0 (cTis (DCIS), cN0(f), cM0, ER+, VA+, HER2: Not Assessed) - Signed by Keila Rodriguez MD on 11/7/2024  Stage prefix: Initial diagnosis  Method of lymph node assessment: Core biopsy  Nuclear grade: G2           History of Present Illness: Patient is here today secondary to newly diagnosed carcinoma of the right breast.  She was asymptomatic referable to the breast.  She has a history of contralateral breast cancer 12 years ago.  She is diabetic and states that her diabetes is under control.  She believes she is due now for hemoglobin A1c.  She did not have any genetic testing previously.  She was counseled on this given the bilateral breast carcinoma.  -Interval History: As noted    Review of Systems:  Review of Systems   Constitutional: Negative.  Negative for appetite change, fever and unexpected weight change.   HENT: Negative.  Negative for trouble swallowing.     Eyes: Negative.    Respiratory: Negative.  Negative for cough and shortness of breath.    Cardiovascular: Negative.  Negative for chest pain.   Gastrointestinal: Negative.  Negative for abdominal pain, nausea and vomiting.   Endocrine: Negative.    Genitourinary: Negative.  Negative for dysuria.   Musculoskeletal: Negative.  Negative for arthralgias and myalgias.   Skin: Negative.    Allergic/Immunologic: Negative.    Neurological: Negative.  Negative for headaches.   Hematological: Negative.  Negative for adenopathy. Does not bruise/bleed easily.   Psychiatric/Behavioral: Negative.         Patient Active Problem List   Diagnosis    History of left breast cancer    S/P trigger finger release    Intrinsic muscle tightness    Arthritis    Diabetes mellitus (HCC)    GERD (gastroesophageal reflux disease)    Disease of thyroid gland    Hypertension    Postural dizziness with near syncope    Fall    Hypokalemia    Transaminitis    Obstructive sleep apnea    Right shoulder pain    Traumatic complete tear of right rotator cuff    Traumatic tear of supraspinatus tendon of left shoulder    Intraductal papillary carcinoma, right    Family history of breast cancer     Past Medical History:   Diagnosis Date    Arthritis     Diabetes mellitus (HCC)     Disease of thyroid gland     GERD (gastroesophageal reflux disease)     Headache     Hemorrhoids     Hx of radiation therapy 03/09/2012    PBRT    Hypertension     Hypertension     Night sweats     Rheumatoid arthritis (HCC)     Rotator cuff injury     Trigger finger     right and left hand    Urinary incontinence     Use of anastrozole (Arimidex)     Use of letrozole (Femara)      Past Surgical History:   Procedure Laterality Date    ABDOMINAL SURGERY      APPENDECTOMY      BREAST BIOPSY Left 01/162012    IDC    BREAST BIOPSY Right 10/08/2024    BREAST LUMPECTOMY Left 02/17/2012    BREAST SURGERY      CATARACT EXTRACTION      CHOLECYSTECTOMY      FOOT SURGERY      right and  left foot    HYSTERECTOMY      JOINT REPLACEMENT      KNEE SURGERY      NM TENDON SHEATH INCISION Right 08/07/2018    Procedure: RELEASE TRIGGER FINGER - LONG FINGER AND RING FINGER;  Surgeon: Domingo Meyers MD;  Location: [facility];  Service: Orthopedics    NM TENDON SHEATH INCISION Left 08/14/2018    Procedure: RELEASE TRIGGER FINGER - LEFT INDEX FINGER;  Surgeon: Domingo Meyers MD;  Location: [facility];  Service: Orthopedics    REPAIR RECTOCELE      TONSILLECTOMY      TUBAL LIGATION      US GUIDED BREAST BIOPSY RIGHT COMPLETE Right 10/8/2024    US GUIDED BREAST LYMPH NODE BIOPSY RIGHT Right 10/8/2024    US GUIDED THYROID BIOPSY  03/30/2017     Family History   Problem Relation Age of Onset    Prostate cancer Father         age at dx unk    Prostate cancer Brother 73    Thyroid cancer Brother         age at dx unk    Skin cancer Brother     Thyroid cancer Daughter 45        age at dx unk    Breast cancer Maternal Aunt 69        age at dx unk    Lung cancer Maternal Uncle         age at dx unk    No Known Problems Son     No Known Problems Son     No Known Problems Son     Pancreatic cancer Maternal Aunt         unknown age     Social History     Socioeconomic History    Marital status: /Civil Union     Spouse name: Not on file    Number of children: Not on file    Years of education: Not on file    Highest education level: Not on file   Occupational History    Not on file   Tobacco Use    Smoking status: Never    Smokeless tobacco: Never   Vaping Use    Vaping status: Never Used   Substance and Sexual Activity    Alcohol use: Yes    Drug use: No    Sexual activity: Not on file   Other Topics Concern    Not on file   Social History Narrative    Not on file     Social Determinants of Health     Financial Resource Strain: Not on file   Food Insecurity: Not on file   Transportation Needs: Not on file   Physical Activity: Not on file   Stress: Not on file   Social Connections: Not on file   Intimate  Partner Violence: Not on file   Housing Stability: Not on file       Current Outpatient Medications:     acetaminophen (TYLENOL 8 HOUR) 650 mg CR tablet, Take 1 tablet (650 mg total) by mouth every 8 (eight) hours (Patient taking differently: Take 650 mg by mouth every 8 (eight) hours as needed for moderate pain), Disp: 15 tablet, Rfl: 0    amLODIPine (NORVASC) 10 mg tablet, Take 10 mg by mouth daily., Disp: , Rfl:     calcium citrate-vitamin D (CITRACAL+D) 315-200 MG-UNIT per tablet, Take 1 tablet by mouth daily., Disp: , Rfl:     furosemide (LASIX) 40 mg tablet, Take 40 mg by mouth daily., Disp: , Rfl:     Lancets (OneTouch Delica Plus [id number]) MISC, TEST BLOOD GLUCOSE DAILY, Disp: , Rfl:     losartan (COZAAR) 100 MG tablet, Take 100 mg by mouth daily., Disp: , Rfl:     metFORMIN (GLUCOPHAGE) 1000 MG tablet, Take 1,000 mg by mouth daily with breakfast , Disp: , Rfl:     Multiple Vitamin (MULTIVITAMIN) tablet, Take 1 tablet by mouth daily., Disp: , Rfl:     OneTouch Verio test strip, use 1 TEST STRIP to TEST BLOOD SUGAR once daily, Disp: , Rfl:     SYNTHROID 88 MCG tablet, , Disp: , Rfl:     traMADol (Ultram) 50 mg tablet, Take 1 tablet (50 mg total) by mouth every 6 (six) hours as needed for severe pain, Disp: 9 tablet, Rfl: 0    doxycycline hyclate (VIBRAMYCIN) 100 mg capsule, Take 100 mg by mouth 2 (two) times a day (Patient not taking: Reported on 11/4/2021), Disp: , Rfl:     lidocaine (LIDODERM) 5 %, Apply 1 patch topically daily for 15 days Remove & Discard patch within 12 hours or as directed by MD (Patient not taking: Reported on 11/4/2021), Disp: 15 patch, Rfl: 0    mupirocin (BACTROBAN) 2 % ointment, APPLY A SMALL AMOUNT TO THE AFFECTED AREA THREE TIMES DAILY (Patient not taking: No sig reported), Disp: , Rfl:     pantoprazole (PROTONIX) 40 mg tablet, Take 1 tablet (40 mg total) by mouth daily in the early morning (Patient not taking: Reported on 11/4/2021), Disp: 30 tablet, Rfl: 0  Allergies   Allergen  Reactions    Sulfa Antibiotics Rash    Penicillins Rash       The following portions of the patient's history were reviewed and updated as appropriate: allergies, current medications, past family history, past medical history, past social history, past surgical history, and problem list.        Vitals:    11/07/24 1023   BP: 136/86   Pulse: 86   Resp: 16   Temp: 98.2 °F (36.8 °C)   SpO2: 98%       Physical Exam  Constitutional:       General: She is not in acute distress.     Appearance: Normal appearance. She is well-developed.   HENT:      Head: Normocephalic and atraumatic.   Cardiovascular:      Heart sounds: Normal heart sounds.   Pulmonary:      Breath sounds: Normal breath sounds.   Chest:   Breasts:     Right: No swelling, bleeding, inverted nipple, mass, nipple discharge, skin change or tenderness.      Left: Skin change (Lumpectomy scar, superficial skin lesion medial aspect) and tenderness present. No swelling, bleeding, inverted nipple, mass or nipple discharge.   Abdominal:      Palpations: Abdomen is soft.   Musculoskeletal:      Right lower leg: No edema.      Left lower leg: No edema.   Lymphadenopathy:      Upper Body:      Right upper body: No supraclavicular, axillary or pectoral adenopathy.      Left upper body: No supraclavicular, axillary or pectoral adenopathy.   Neurological:      Mental Status: She is alert and oriented to person, place, and time.   Psychiatric:         Mood and Affect: Mood normal.           Results:  Labs:  Last hemoglobin A1c in the chart is from 2021 and was 6.7    Imaging  2/12/2024 bilateral 3D screening mammogram the right asymmetry, left side was benign    8/13/2024 right 3D diagnostic mammogram and ultrasound the asymmetry was less conspicuous on mammography but there was a sonographic correlate at the 9:00 axis III centimeters from the nipple revealing a 7 mm lesion thought to represent a debris-filled cyst and short-term follow-up was recommended    9/19/2024  right 3D diagnostic mammogram and ultrasound an oval mass was noted on mammography again with a 7 mm correlate on ultrasound now with indistinct margins and biopsy was recommended, right axillary lymph node on ultrasound demonstrated cortical thickening for which biopsy was recommended    10/8/2024 postbiopsy mammogram is concordant, Jessica reflector in the breast and axilla        I reviewed the above laboratory and imaging data.    Discussion/Summary: 84-year-old female with a history of left breast carcinoma 12 years ago.  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  Invasion could not be excluded on the core needle biopsy.  I reviewed these findings with her.  We discussed the multimodality treatment of breast cancer to include surgery, radiation and medical therapy.  She is again a candidate for breast conservation.  She would like to proceed in this fashion.  Given the inability to comment on invasion with the limited sample as well as what was thought to be a suspicious node on imaging, I am recommending sentinel node biopsy.  She understands that she will be referred to both medical and radiation oncology again.  All of her questions were answered.  Consent was signed today in the office.  She will be scheduled for surgery in the near term.  Additionally, the patient is agreeable to proceed with genetic testing and we will start this process for her today in the office.

## 2024-11-07 NOTE — TELEPHONE ENCOUNTER
I introduced myself to Tanya and let her know that her breast surgeon reached out to the cancer risk and genetics program on her behalf to begin STAT genetic testing process.    I reviewed the following with Tanya:    While the majority of cancer occurs by chance, approximately 5-10% of breast cancer has an underlying genetic cause. Genetic testing is available which can determine if there is an underlying genetic cause to your cancer. Understanding if there is an underlying genetic cause can:  Provide your surgeon with additional information to help with surgical decisions (i.e. lumpectomy vs mastectomy), treatment decisions and eligibility for clinical trials.    It can determine if you have an increased risk for any additional cancers.  Help family members understand their cancer risk.       We work closely with the [facility] breast surgeons and are reaching out to see if you have interest in genetic testing. This test is not a requirement but can take 5-10 days to complete so we would like to start the process as soon as possible so the results are ready for your appointment with your surgeon.       If interested, family history will be collected to initiate STAT genetic testing process.        Patient elected to pursue testing     Diagnosis Details:  Papillary carcinoma  Right  ER+, OH+    Personal History:  Do you have a personal history of any other cancer? Yes  If yes type/age of diagnosis: Left Breast Cancer age 72    Family history:   Do you have Ashkenazi Ep[employer] ancestry? No  If yes, maternal, paternal, or both?    Do you have any children? Yes  How many sons? 3  How many daughters? 1  Do any of your children have a history of cancer? Yes  If yes type/age of diagnosis:   Son - Prostate Cancer age 59  Daughter - Thyroid Cancer age 50    Do you have any brothers or sisters? Yes  How many brothers? 1  How many sisters? 0  Are they from the same parents? Yes  If no how maternal/paternal half-siblings:  Do  any of your brothers or sisters have a history of cancer? Yes  If yes who and the type/age of diagnosis:   Brother - Prostate Cancer age 73; Thyroid Cancer age unsure          Do you have nieces or nephews? Yes  Do any of them have a history of cancer? No  If yes type/age of diagnosis:    Does your mother have a history of cancer? No  If yes, cancer type and age of diagnosis:   Is your mother still living? No  Age/Age of death: 96    Thinking about your mother's family (aunts, uncles, cousins, grandparents) is there anyone with a history of cancer? Yes  If yes, list relationship, cancer type and age of diagnosis:  Maternal Uncle - Lung Cancer age 65 ()  Maternal Aunt - Breast Cancer age 69 ()  Maternal Aunt -  Pancreatic Cancer age unsure ()    Does your father have a history of cancer? Yes  If yes, cancer type and age of diagnosis: Prostate Cancer age unsure  Is your father still living? No  Age/age of death: 88    Thinking about your father's family (aunts, uncles, cousins, grandparents) is there anyone with a history of cancer? No  If yes, list relationship, cancer type and age of diagnosis:      Types of Results  Positive Result - May explain personal diagnosis/family history. Can give surgeon information on treatment plan, inform future screening/management or tell a person about other possible risks. Positive results can initiate testing for other family members who may be at risk (children, siblings, etc)  Negative Result - Does not give an explanation. Surgical/treatment plan will be based on clinical presentation and will be part of discussion with surgeon. Negative result cannot be passed down to children, but they are still at elevated risk.  Uncertain Result - Common, but treated like a negative result clinically. 90% are downgraded over time.     [employer]'s billing policy   Most individuals pay <$100 for hereditary cancer genetic testing. If insurance covers the cost of the  testing, individuals may still pay out of pocket secondary to co-pays, co-insurance, or deductibles. If the cost of the testing exceeds $100, the lab will reach out to the patient via phone or e-mail. The patient will then have the option to proceed with the testing, cancel the testing, or elect the self-pay option of $250. Tanya verbalized understanding.    A blood kit will be mailed to you overnight. Please take the blood kit along with packet of paperwork to any [facility] lab to have your blood drawn.    We have genetic counselors available, if you have any additional questions or would like to speak with them we can schedule you a 15 minute appointment.      Plan:  A blood kit was collected at [facility] and the patient was provided information on genetic testing and the lab's billing policy.     Genetic Testing Preformed: [email or web address] BRCA[facility] STAT Panel (13 genes): RUTH ANN, BARD1, BRCA1, BRCA2, CDH1, CHEK2, NF1, PALB2, PTEN, RAD51C, RAD51D, STK11, TP53 with reflex to [email or web address]: Cancer+RNAinsight (59 genes): APC, RUTH ANN, AXIN2, BAP1, BARD1, BMPR1A, BRCA1, BRCA2, BRIP1, CDH1, CDK4, CDKN1B, CDKN2A, CHEK2, CTNNA1, DICER1, EGLN1, EPCAM, FH, FLCN, GREM1, HOXB13, KIF1B, KIT, MAX, MEN1, MET, MITF, MLH1, MSH2, MSH3, MSH6, MUTYH, NF1, NTHL1, PALB2, PDGFRA PMS2, POLD1, POLE, POT1, PTEN, RAD51C, RAD51D, RB1, RET, SDHA, SDHAF2, SDHB, SDHC, SDHD, SMAD4, SMARCA4, STK11, NCHZ893, TP53, TSC1, TSC2, VHL     Result Call Information:  In the event that we need to reach Tanya via telephone:  I confirmed the patient's mobile number on file as the best number to call with results  I confirmed with the patient that we can leave a voicemail on the provided numbers    Initial results will take approximately 5-12 days to return     Additional results may take up to 2-3 weeks to complete once test is started.    Patient does not have any further questions, declined meeting with genetic counselor    When your results are ready, someone  from the genetics team will call you, review the results, and contact your breast surgeon. You will be contacted with any type of result - Positive, Negative, or Uncertain.        Inflammation Suggestive Of Cancer Camouflage Histology Text: There was a dense lymphocytic infiltrate which prevented adequate histologic evaluation of adjacent structures.

## 2024-11-07 NOTE — H&P (VIEW-ONLY)
Surgical Oncology Follow Up       [address] [facility] SURGICAL ONCOLOGY HORACIO  [address] [facility] [place] [address]    Tanya HOWARD Khanh  1940  [id number]  [address] [facility] SURGICAL ONCOLOGY HORACIO  [address] [facility]  [facility] [place] [address]    Chief Complaint   Patient presents with    New Patient Visit       Assessment & Plan   Diagnoses and all orders for this visit:    Intraductal papillary carcinoma, right  -     Case request operating room: RIGHT KRISTIN LOCALIZED LUMPECTOMY WITH SENTINEL LYMPH NODE BIOPSY; Standing  -     UA w Reflex to Microscopic w Reflex to Culture; Future  -     Comprehensive metabolic panel; Future  -     CBC and differential; Future  -     XR chest pa and lateral; Future  -     EKG 12 lead; Future  -     HEMOGLOBIN A1C W/ EAG ESTIMATION; Future  -     NM lymphatic breast; Future  -     Case request operating room: RIGHT KRISTIN LOCALIZED LUMPECTOMY WITH SENTINEL LYMPH NODE BIOPSY  -     traMADol (Ultram) 50 mg tablet; Take 1 tablet (50 mg total) by mouth every 6 (six) hours as needed for severe pain    History of left breast cancer    Family history of breast cancer    Postprocedural hypoinsulinemia  -     HEMOGLOBIN A1C W/ EAG ESTIMATION; Future    Other orders  -     Incentive spirometry; Standing  -     Insert and maintain IV line; Standing  -     Void On-Call to O.R.; Standing  -     Place sequential compression device; Standing  -     Reason for no Pharmacologic VTE Prophylaxis; Standing  -     acetaminophen (Ofirmev) injection 1,000 mg  -     ceFAZolin (ANCEF) 2,000 mg in dextrose 5 % 100 mL IVPB        Advance Care Planning/Advance Directives:  Discussed disease status, cancer treatment plans and/or cancer treatment goals with the patient.     Oncology History:    Oncology History   History of left breast cancer   1/16/2012 Biopsy    Left breast stereotactic biopsy  Invasive ductal carcinoma  Grade 1  , SC  100, HER2 2+ FISH negative     2/17/2012 Surgery    Left lumpectomy with SLN biopsy (Dr. Rodriguez)  Invasive ductal carcinoma  4 mm  0/1 Lymph node     3/19/2012 -  Radiation    PBRT  Dr Huizar,  Dr Mckee     4/2012 - 12/2013 Hormone Therapy    Anastrozole 4/2012 - 8/2013  Letrozole 8/2013 - 12/2013 (D/C due to joint aches)  Dr Barron     Intraductal papillary carcinoma, right   10/8/2024 Biopsy    Right breast ultrasound-guided biopsy  A. 9 o'clock, 3 cm from nipple (KRISTIN)  Papillary carcinoma  Grade 2  Invasion is not identified in this sample; favored to represent an encapsulated papillary carcinoma, however, complete classification should be reserved for excisional sample    B. Right axillary lymph node (KRISTIN)  One benign lymph node, negative for carcinoma    Concordant. Right malignancy appears unifocal; suspicious masses cover an area up to 6 mm on US. Right axilla negative after benign biopsy. Left breast clear on 2/2024 screening mammo.      10/8/2024 -  Cancer Staged    Staging form: Breast, AJCC 8th Edition  - Clinical stage from 10/8/2024: Stage 0 (cTis (DCIS), cN0(f), cM0, ER+, CO+, HER2: Not Assessed) - Signed by Keila Rodriguez MD on 11/7/2024  Stage prefix: Initial diagnosis  Method of lymph node assessment: Core biopsy  Nuclear grade: G2           History of Present Illness: Patient is here today secondary to newly diagnosed carcinoma of the right breast.  She was asymptomatic referable to the breast.  She has a history of contralateral breast cancer 12 years ago.  She is diabetic and states that her diabetes is under control.  She believes she is due now for hemoglobin A1c.  She did not have any genetic testing previously.  She was counseled on this given the bilateral breast carcinoma.  -Interval History: As noted    Review of Systems:  Review of Systems   Constitutional: Negative.  Negative for appetite change, fever and unexpected weight change.   HENT: Negative.  Negative for trouble swallowing.     Eyes: Negative.    Respiratory: Negative.  Negative for cough and shortness of breath.    Cardiovascular: Negative.  Negative for chest pain.   Gastrointestinal: Negative.  Negative for abdominal pain, nausea and vomiting.   Endocrine: Negative.    Genitourinary: Negative.  Negative for dysuria.   Musculoskeletal: Negative.  Negative for arthralgias and myalgias.   Skin: Negative.    Allergic/Immunologic: Negative.    Neurological: Negative.  Negative for headaches.   Hematological: Negative.  Negative for adenopathy. Does not bruise/bleed easily.   Psychiatric/Behavioral: Negative.         Patient Active Problem List   Diagnosis    History of left breast cancer    S/P trigger finger release    Intrinsic muscle tightness    Arthritis    Diabetes mellitus (HCC)    GERD (gastroesophageal reflux disease)    Disease of thyroid gland    Hypertension    Postural dizziness with near syncope    Fall    Hypokalemia    Transaminitis    Obstructive sleep apnea    Right shoulder pain    Traumatic complete tear of right rotator cuff    Traumatic tear of supraspinatus tendon of left shoulder    Intraductal papillary carcinoma, right    Family history of breast cancer     Past Medical History:   Diagnosis Date    Arthritis     Diabetes mellitus (HCC)     Disease of thyroid gland     GERD (gastroesophageal reflux disease)     Headache     Hemorrhoids     Hx of radiation therapy 03/09/2012    PBRT    Hypertension     Hypertension     Night sweats     Rheumatoid arthritis (HCC)     Rotator cuff injury     Trigger finger     right and left hand    Urinary incontinence     Use of anastrozole (Arimidex)     Use of letrozole (Femara)      Past Surgical History:   Procedure Laterality Date    ABDOMINAL SURGERY      APPENDECTOMY      BREAST BIOPSY Left 01/162012    IDC    BREAST BIOPSY Right 10/08/2024    BREAST LUMPECTOMY Left 02/17/2012    BREAST SURGERY      CATARACT EXTRACTION      CHOLECYSTECTOMY      FOOT SURGERY      right and  left foot    HYSTERECTOMY      JOINT REPLACEMENT      KNEE SURGERY      AK TENDON SHEATH INCISION Right 08/07/2018    Procedure: RELEASE TRIGGER FINGER - LONG FINGER AND RING FINGER;  Surgeon: Domingo Meyers MD;  Location: [facility];  Service: Orthopedics    AK TENDON SHEATH INCISION Left 08/14/2018    Procedure: RELEASE TRIGGER FINGER - LEFT INDEX FINGER;  Surgeon: Domingo Meyers MD;  Location: [facility];  Service: Orthopedics    REPAIR RECTOCELE      TONSILLECTOMY      TUBAL LIGATION      US GUIDED BREAST BIOPSY RIGHT COMPLETE Right 10/8/2024    US GUIDED BREAST LYMPH NODE BIOPSY RIGHT Right 10/8/2024    US GUIDED THYROID BIOPSY  03/30/2017     Family History   Problem Relation Age of Onset    Prostate cancer Father         age at dx unk    Prostate cancer Brother 73    Thyroid cancer Brother         age at dx unk    Skin cancer Brother     Thyroid cancer Daughter 45        age at dx unk    Breast cancer Maternal Aunt 69        age at dx unk    Lung cancer Maternal Uncle         age at dx unk    No Known Problems Son     No Known Problems Son     No Known Problems Son     Pancreatic cancer Maternal Aunt         unknown age     Social History     Socioeconomic History    Marital status: /Civil Union     Spouse name: Not on file    Number of children: Not on file    Years of education: Not on file    Highest education level: Not on file   Occupational History    Not on file   Tobacco Use    Smoking status: Never    Smokeless tobacco: Never   Vaping Use    Vaping status: Never Used   Substance and Sexual Activity    Alcohol use: Yes    Drug use: No    Sexual activity: Not on file   Other Topics Concern    Not on file   Social History Narrative    Not on file     Social Determinants of Health     Financial Resource Strain: Not on file   Food Insecurity: Not on file   Transportation Needs: Not on file   Physical Activity: Not on file   Stress: Not on file   Social Connections: Not on file   Intimate  Partner Violence: Not on file   Housing Stability: Not on file       Current Outpatient Medications:     acetaminophen (TYLENOL 8 HOUR) 650 mg CR tablet, Take 1 tablet (650 mg total) by mouth every 8 (eight) hours (Patient taking differently: Take 650 mg by mouth every 8 (eight) hours as needed for moderate pain), Disp: 15 tablet, Rfl: 0    amLODIPine (NORVASC) 10 mg tablet, Take 10 mg by mouth daily., Disp: , Rfl:     calcium citrate-vitamin D (CITRACAL+D) 315-200 MG-UNIT per tablet, Take 1 tablet by mouth daily., Disp: , Rfl:     furosemide (LASIX) 40 mg tablet, Take 40 mg by mouth daily., Disp: , Rfl:     Lancets (OneTouch Delica Plus [id number]) MISC, TEST BLOOD GLUCOSE DAILY, Disp: , Rfl:     losartan (COZAAR) 100 MG tablet, Take 100 mg by mouth daily., Disp: , Rfl:     metFORMIN (GLUCOPHAGE) 1000 MG tablet, Take 1,000 mg by mouth daily with breakfast , Disp: , Rfl:     Multiple Vitamin (MULTIVITAMIN) tablet, Take 1 tablet by mouth daily., Disp: , Rfl:     OneTouch Verio test strip, use 1 TEST STRIP to TEST BLOOD SUGAR once daily, Disp: , Rfl:     SYNTHROID 88 MCG tablet, , Disp: , Rfl:     traMADol (Ultram) 50 mg tablet, Take 1 tablet (50 mg total) by mouth every 6 (six) hours as needed for severe pain, Disp: 9 tablet, Rfl: 0    doxycycline hyclate (VIBRAMYCIN) 100 mg capsule, Take 100 mg by mouth 2 (two) times a day (Patient not taking: Reported on 11/4/2021), Disp: , Rfl:     lidocaine (LIDODERM) 5 %, Apply 1 patch topically daily for 15 days Remove & Discard patch within 12 hours or as directed by MD (Patient not taking: Reported on 11/4/2021), Disp: 15 patch, Rfl: 0    mupirocin (BACTROBAN) 2 % ointment, APPLY A SMALL AMOUNT TO THE AFFECTED AREA THREE TIMES DAILY (Patient not taking: No sig reported), Disp: , Rfl:     pantoprazole (PROTONIX) 40 mg tablet, Take 1 tablet (40 mg total) by mouth daily in the early morning (Patient not taking: Reported on 11/4/2021), Disp: 30 tablet, Rfl: 0  Allergies   Allergen  Reactions    Sulfa Antibiotics Rash    Penicillins Rash       The following portions of the patient's history were reviewed and updated as appropriate: allergies, current medications, past family history, past medical history, past social history, past surgical history, and problem list.        Vitals:    11/07/24 1023   BP: 136/86   Pulse: 86   Resp: 16   Temp: 98.2 °F (36.8 °C)   SpO2: 98%       Physical Exam  Constitutional:       General: She is not in acute distress.     Appearance: Normal appearance. She is well-developed.   HENT:      Head: Normocephalic and atraumatic.   Cardiovascular:      Heart sounds: Normal heart sounds.   Pulmonary:      Breath sounds: Normal breath sounds.   Chest:   Breasts:     Right: No swelling, bleeding, inverted nipple, mass, nipple discharge, skin change or tenderness.      Left: Skin change (Lumpectomy scar, superficial skin lesion medial aspect) and tenderness present. No swelling, bleeding, inverted nipple, mass or nipple discharge.   Abdominal:      Palpations: Abdomen is soft.   Musculoskeletal:      Right lower leg: No edema.      Left lower leg: No edema.   Lymphadenopathy:      Upper Body:      Right upper body: No supraclavicular, axillary or pectoral adenopathy.      Left upper body: No supraclavicular, axillary or pectoral adenopathy.   Neurological:      Mental Status: She is alert and oriented to person, place, and time.   Psychiatric:         Mood and Affect: Mood normal.           Results:  Labs:  Last hemoglobin A1c in the chart is from 2021 and was 6.7    Imaging  2/12/2024 bilateral 3D screening mammogram the right asymmetry, left side was benign    8/13/2024 right 3D diagnostic mammogram and ultrasound the asymmetry was less conspicuous on mammography but there was a sonographic correlate at the 9:00 axis III centimeters from the nipple revealing a 7 mm lesion thought to represent a debris-filled cyst and short-term follow-up was recommended    9/19/2024  right 3D diagnostic mammogram and ultrasound an oval mass was noted on mammography again with a 7 mm correlate on ultrasound now with indistinct margins and biopsy was recommended, right axillary lymph node on ultrasound demonstrated cortical thickening for which biopsy was recommended    10/8/2024 postbiopsy mammogram is concordant, Jessica reflector in the breast and axilla        I reviewed the above laboratory and imaging data.    Discussion/Summary: 84-year-old female with a history of left breast carcinoma 12 years ago.  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  Invasion could not be excluded on the core needle biopsy.  I reviewed these findings with her.  We discussed the multimodality treatment of breast cancer to include surgery, radiation and medical therapy.  She is again a candidate for breast conservation.  She would like to proceed in this fashion.  Given the inability to comment on invasion with the limited sample as well as what was thought to be a suspicious node on imaging, I am recommending sentinel node biopsy.  She understands that she will be referred to both medical and radiation oncology again.  All of her questions were answered.  Consent was signed today in the office.  She will be scheduled for surgery in the near term.  Additionally, the patient is agreeable to proceed with genetic testing and we will start this process for her today in the office.

## 2024-11-08 ENCOUNTER — PATIENT OUTREACH (OUTPATIENT)
Dept: HEMATOLOGY ONCOLOGY | Facility: CLINIC | Age: 84
End: 2024-11-08

## 2024-11-08 DIAGNOSIS — Z85.3 HISTORY OF LEFT BREAST CANCER: Primary | ICD-10-CM

## 2024-11-08 LAB — MISCELLANEOUS LAB TEST RESULT: NORMAL

## 2024-11-08 NOTE — PROGRESS NOTES
I reached out and spoke with Tanya now that consults have been completed with the oncology teams to review for any barriers to care and offer supportive services as needed. Distress Thermometer completed at this time. Patient scored 10/10. Referral to SW placed. I reviewed and updated the members assigned to the care team in [facility].     Patient answered all of my questions and did not have any concerns at this time however patient did rate herself a 10/10 on her DT.    Although patient was very polite and answered all of my questions she seemed to not want to be bothered. Patient was however thankful for my call.    She knows the members of the care team as well as how and when to contact them with any needs.     She verbalizes managing the schedules well. Patient mentioned between her  and her daughter they keep track of all her appointments.    She is currently able to drive and denies any transportation needs. Patient mentioned that her  and daughter are how she gets to and from all of her appointments.    We did not discuss palliative care at this time.    She states that she is eating and drinking as per usual with no unintentional weight loss.       Patient does not smoke.     She states she is well supported by family and friends.  Community support groups discussed including the Cancer Support Community of the [facility]. Patient declined information at this time.     She feels she has adequate insurance coverage and denies any financial concerns at this time.     Based on individual needs I will follow up in about 2-3 weeks. I have provided my direct contact information and welcome them to contact me if needs as discussed above change. She was appreciative for the call.

## 2024-11-08 NOTE — PRE-PROCEDURE INSTRUCTIONS
Pre-Surgery Instructions:   Medication Instructions    acetaminophen (TYLENOL 8 HOUR) 650 mg CR tablet Uses PRN- OK to take day of surgery    amLODIPine (NORVASC) 10 mg tablet Take day of surgery.    calcium citrate-vitamin D (CITRACAL+D) 315-200 MG-UNIT per tablet Stop taking 7 days prior to surgery.    furosemide (LASIX) 40 mg tablet Hold day of surgery.    losartan (COZAAR) 100 MG tablet Hold day of surgery.    metFORMIN (GLUCOPHAGE) 500 mg tablet Hold day of surgery.    Multiple Vitamin (MULTIVITAMIN) tablet Stop taking 7 days prior to surgery.    SYNTHROID 88 MCG tablet Take day of surgery.    Medication instructions for day surgery reviewed. Please use only a sip of water to take your instructed medications. Avoid all over the counter vitamins, supplements and NSAIDS for one week prior to surgery per anesthesia guidelines. Tylenol is ok to take as needed.     You will receive a call one business day prior to surgery with an arrival time and hospital directions. If your surgery is scheduled on a Monday, the hospital will be calling you on the Friday prior to your surgery. If you have not heard from anyone by 8pm, please call the hospital supervisor through the hospital  at [phone]. ([place] [phone] or [place] [phone]).    Do not eat or drink anything after midnight the night before your surgery, including candy, mints, lifesavers, or chewing gum. Do not drink alcohol 24hrs before your surgery. Try not to smoke at least 24hrs before your surgery.       Follow the pre surgery showering instructions as listed in the “My Surgical Experience Booklet” or otherwise provided by your surgeon's office. Do not use a blade to shave the surgical area 1 week before surgery. It is okay to use a clean electric clippers up to 24 hours before surgery. Do not apply any lotions, creams, including makeup, cologne, deodorant, or perfumes after showering on the day of your surgery. Do not use dry shampoo,  hair spray, hair gel, or any type of hair products.     No contact lenses, eye make-up, or artificial eyelashes. Remove nail polish, including gel polish, and any artificial, gel, or acrylic nails if possible. Remove all jewelry including rings and body piercing jewelry.     Wear causal clothing that is easy to take on and off. Consider your type of surgery.    Keep any valuables, jewelry, piercings at home. Please bring any specially ordered equipment (sling, braces) if indicated.    Arrange for a responsible person to drive you to and from the hospital on the day of your surgery. Please confirm the visitor policy for the day of your procedure when you receive your phone call with an arrival time.     Call the surgeon's office with any new illnesses, exposures, or additional questions prior to surgery.    Please reference your “My Surgical Experience Booklet” for additional information to prepare for your upcoming surgery.

## 2024-11-11 ENCOUNTER — APPOINTMENT (OUTPATIENT)
Dept: LAB | Facility: CLINIC | Age: 84
End: 2024-11-11
Payer: MEDICARE

## 2024-11-11 ENCOUNTER — PATIENT OUTREACH (OUTPATIENT)
Dept: CASE MANAGEMENT | Facility: OTHER | Age: 84
End: 2024-11-11

## 2024-11-11 ENCOUNTER — HOSPITAL ENCOUNTER (OUTPATIENT)
Dept: RADIOLOGY | Facility: HOSPITAL | Age: 84
Discharge: HOME | End: 2024-11-11
Payer: MEDICARE

## 2024-11-11 DIAGNOSIS — D05.11 INTRADUCTAL PAPILLARY CARCINOMA, RIGHT: ICD-10-CM

## 2024-11-11 PROCEDURE — 71046 X-RAY EXAM CHEST 2 VIEWS: CPT

## 2024-11-12 LAB
ATRIAL RATE: 85 BPM
P AXIS: 44 DEGREES
PR INTERVAL: 148 MS
QRS AXIS: 5 DEGREES
QRSD INTERVAL: 90 MS
QT INTERVAL: 382 MS
QTC INTERVAL: 455 MS
T WAVE AXIS: 113 DEGREES
VENTRICULAR RATE: 85 BPM

## 2024-11-12 PROCEDURE — 93010 ELECTROCARDIOGRAM REPORT: CPT | Performed by: INTERNAL MEDICINE

## 2024-11-13 ENCOUNTER — ANESTHESIA EVENT (OUTPATIENT)
Dept: PERIOP | Facility: HOSPITAL | Age: 84
End: 2024-11-13
Payer: MEDICARE

## 2024-11-15 ENCOUNTER — PATIENT OUTREACH (OUTPATIENT)
Dept: CASE MANAGEMENT | Facility: OTHER | Age: 84
End: 2024-11-15

## 2024-11-15 NOTE — PROGRESS NOTES
"Biopsychosocial and Barriers Assessment    Type of Cancer: Breast  Treatment plan: Scheduled for a lumpectomy on 11/19  Noted barriers to care: none  Cultural/Yarsanism concerns: none  Hair Loss/ Wig resources needed: not at this time    DT completed: yes  DT score: 10/10  Issues noted: sleep,and fatigue    Marital status/Lives with: /Spouse and daughter  Pt's support system: Family  Mental Health history: none  Substance Abuse: none    Employment/income source: retired  Concerns with bills (treatment vs household): none  Noted issues with home: none    Narrative note:   OSW placed outreach TC to pt this morning. OSW introduced self and role. Pt is a very pleasant woman with a dx of breast cancer. She has a history of breast cancer in 2012. She reports that she is well supported by her family. This writer addressed her rating her distress at 10/10. She shared that her son has a dx of metastatic prostate cancer and this has been causing her a great deal of stress. OSW allowed time for her to express her feelings and offered support. She is very positive with her diagnosis. She states \"I conquered it once and I can do it again\". OSW expressed that it is wonderful she has such a positive attitude.   OSW educated on the [facility] and she was agreeable to this writer mailing out their newsletter. OSW offered to call and check in with her after her surgery, however she states she will call this writer if she needs anything in the future.  Osw will close the referral at this time, however if any needs present in the future another referral can be placed at that time.   "

## 2024-11-16 LAB
GENE DIS ANL INTERP-IMP: NORMAL
GENES NOT REPORTED: NORMAL
INTERPRETATION: NORMAL

## 2024-11-18 ENCOUNTER — TELEPHONE (OUTPATIENT)
Dept: GENETICS | Facility: CLINIC | Age: 84
End: 2024-11-18

## 2024-11-18 NOTE — TELEPHONE ENCOUNTER
STAT Genetic Test Result    Summary:    I spoke with Tanya to review the result of her STAT genetic test.    Initial Test: [facility] BRCA[facility] STAT Panel (13 genes): RUTH ANN, BARD1, BRCA1, BRCA2, CDH1, CHEK2, NF1, PALB2, PTEN, RAD51C, RAD51D, STK11, TP53     Result: NEGATIVE: No Clinically Significant Variants Detected      Assessment:     Negative   A negative result significantly reduces the likelihood that Tanya has a hereditary cancer syndrome related to the high-risk breast cancer genes listed above.      This result does not have surgical implications and surgical options should be discussed with her healthcare provider.        Additional Testing:  [facility] (59 genes): APC, RUTH ANN, AXIN2, BAP1, BARD1, BMPR1A, BRCA1, BRCA2, BRIP1, CDH1, CDK4, CDKN1B, CDKN2A, CHEK2, CTNNA1, DICER1, EGLN1, EPCAM, FH, FLCN, GREM1, HOXB13, KIF1B, KIT, MAX, MEN1, MET, MITF, MLH1, MSH2, MSH3, MSH6, MUTYH, NF1, NTHL1, PALB2, PDGFRA PMS2, POLD1, POLE, POT1, PTEN, RAD51C, RAD51D, RB1, RET, SDHA, SDHAF2, SDHB, SDHC, SDHD, SMAD4, SMARCA4, STK11, GURN296, TP53, TSC1, TSC2, VHL test is pending.  We will contact Tanya once results are available.  Additional recommendations for surveillance/medical management will be made upon final genetic test result.         Tanya's breast surgeon Dr. Rodriguez was made aware of this test result.

## 2024-11-19 ENCOUNTER — ANESTHESIA (OUTPATIENT)
Dept: PERIOP | Facility: HOSPITAL | Age: 84
End: 2024-11-19
Payer: MEDICARE

## 2024-11-19 ENCOUNTER — APPOINTMENT (OUTPATIENT)
Dept: MAMMOGRAPHY | Facility: HOSPITAL | Age: 84
End: 2024-11-19
Payer: MEDICARE

## 2024-11-19 ENCOUNTER — HOSPITAL ENCOUNTER (OUTPATIENT)
Dept: MAMMOGRAPHY | Facility: HOSPITAL | Age: 84
Discharge: HOME | End: 2024-11-19
Payer: MEDICARE

## 2024-11-19 ENCOUNTER — HOSPITAL ENCOUNTER (OUTPATIENT)
Facility: HOSPITAL | Age: 84
Setting detail: OUTPATIENT SURGERY
Discharge: HOME | End: 2024-11-19
Attending: SURGERY | Admitting: SURGERY
Payer: MEDICARE

## 2024-11-19 ENCOUNTER — HOSPITAL ENCOUNTER (OUTPATIENT)
Dept: NUCLEAR MEDICINE | Facility: HOSPITAL | Age: 84
Discharge: HOME | End: 2024-11-19
Attending: SURGERY
Payer: MEDICARE

## 2024-11-19 VITALS
WEIGHT: 222.22 LBS | RESPIRATION RATE: 16 BRPM | TEMPERATURE: 96.9 F | BODY MASS INDEX: 40.89 KG/M2 | SYSTOLIC BLOOD PRESSURE: 131 MMHG | HEART RATE: 83 BPM | OXYGEN SATURATION: 92 % | HEIGHT: 62 IN | DIASTOLIC BLOOD PRESSURE: 62 MMHG

## 2024-11-19 DIAGNOSIS — D05.11 INTRADUCTAL PAPILLARY CARCINOMA, RIGHT: ICD-10-CM

## 2024-11-19 DIAGNOSIS — D05.11 INTRADUCTAL CARCINOMA IN SITU OF RIGHT BREAST: Primary | ICD-10-CM

## 2024-11-19 LAB
GLUCOSE SERPL-MCNC: 142 MG/DL (ref 65–140)
GLUCOSE SERPL-MCNC: 165 MG/DL (ref 65–140)

## 2024-11-19 PROCEDURE — 78195 LYMPH SYSTEM IMAGING: CPT

## 2024-11-19 PROCEDURE — 19301 PARTIAL MASTECTOMY: CPT | Performed by: SURGERY

## 2024-11-19 PROCEDURE — 88307 TISSUE EXAM BY PATHOLOGIST: CPT | Performed by: PATHOLOGY

## 2024-11-19 PROCEDURE — 38900 IO MAP OF SENT LYMPH NODE: CPT | Performed by: SURGERY

## 2024-11-19 PROCEDURE — 38525 BIOPSY/REMOVAL LYMPH NODES: CPT | Performed by: SURGERY

## 2024-11-19 PROCEDURE — A9541 TC99M SULFUR COLLOID: HCPCS

## 2024-11-19 PROCEDURE — 82948 REAGENT STRIP/BLOOD GLUCOSE: CPT

## 2024-11-19 RX ORDER — TRAMADOL HYDROCHLORIDE 50 MG/1
50 TABLET ORAL EVERY 6 HOURS PRN
Status: DISCONTINUED | OUTPATIENT
Start: 2024-11-19 | End: 2024-11-19 | Stop reason: HOSPADM

## 2024-11-19 RX ORDER — DEXAMETHASONE SODIUM PHOSPHATE 10 MG/ML
INJECTION, SOLUTION INTRAMUSCULAR; INTRAVENOUS AS NEEDED
Status: DISCONTINUED | OUTPATIENT
Start: 2024-11-19 | End: 2024-11-19

## 2024-11-19 RX ORDER — MEPERIDINE HYDROCHLORIDE 25 MG/ML
12.5 INJECTION INTRAMUSCULAR; INTRAVENOUS; SUBCUTANEOUS ONCE AS NEEDED
Status: DISCONTINUED | OUTPATIENT
Start: 2024-11-19 | End: 2024-11-19 | Stop reason: HOSPADM

## 2024-11-19 RX ORDER — PROMETHAZINE HYDROCHLORIDE 25 MG/ML
6.25 INJECTION, SOLUTION INTRAMUSCULAR; INTRAVENOUS ONCE AS NEEDED
Status: DISCONTINUED | OUTPATIENT
Start: 2024-11-19 | End: 2024-11-19 | Stop reason: HOSPADM

## 2024-11-19 RX ORDER — MAGNESIUM HYDROXIDE 1200 MG/15ML
LIQUID ORAL AS NEEDED
Status: DISCONTINUED | OUTPATIENT
Start: 2024-11-19 | End: 2024-11-19 | Stop reason: HOSPADM

## 2024-11-19 RX ORDER — PROPOFOL 10 MG/ML
INJECTION, EMULSION INTRAVENOUS AS NEEDED
Status: DISCONTINUED | OUTPATIENT
Start: 2024-11-19 | End: 2024-11-19

## 2024-11-19 RX ORDER — LIDOCAINE HYDROCHLORIDE 20 MG/ML
INJECTION, SOLUTION EPIDURAL; INFILTRATION; INTRACAUDAL; PERINEURAL AS NEEDED
Status: DISCONTINUED | OUTPATIENT
Start: 2024-11-19 | End: 2024-11-19

## 2024-11-19 RX ORDER — FENTANYL CITRATE 50 UG/ML
INJECTION, SOLUTION INTRAMUSCULAR; INTRAVENOUS AS NEEDED
Status: DISCONTINUED | OUTPATIENT
Start: 2024-11-19 | End: 2024-11-19

## 2024-11-19 RX ORDER — HYDROMORPHONE HCL/PF 1 MG/ML
0.5 SYRINGE (ML) INJECTION
Status: DISCONTINUED | OUTPATIENT
Start: 2024-11-19 | End: 2024-11-19 | Stop reason: HOSPADM

## 2024-11-19 RX ORDER — ONDANSETRON 2 MG/ML
INJECTION INTRAMUSCULAR; INTRAVENOUS AS NEEDED
Status: DISCONTINUED | OUTPATIENT
Start: 2024-11-19 | End: 2024-11-19

## 2024-11-19 RX ORDER — FENTANYL CITRATE/PF 50 MCG/ML
50 SYRINGE (ML) INJECTION
Status: DISCONTINUED | OUTPATIENT
Start: 2024-11-19 | End: 2024-11-19 | Stop reason: HOSPADM

## 2024-11-19 RX ORDER — SODIUM CHLORIDE 9 MG/ML
125 INJECTION, SOLUTION INTRAVENOUS CONTINUOUS
Status: DISCONTINUED | OUTPATIENT
Start: 2024-11-19 | End: 2024-11-19 | Stop reason: HOSPADM

## 2024-11-19 RX ORDER — CEFAZOLIN SODIUM 2 G/50ML
2000 SOLUTION INTRAVENOUS
Status: DISCONTINUED | OUTPATIENT
Start: 2024-11-19 | End: 2024-11-19 | Stop reason: HOSPADM

## 2024-11-19 RX ORDER — ONDANSETRON 2 MG/ML
4 INJECTION INTRAMUSCULAR; INTRAVENOUS ONCE AS NEEDED
Status: DISCONTINUED | OUTPATIENT
Start: 2024-11-19 | End: 2024-11-19 | Stop reason: HOSPADM

## 2024-11-19 RX ORDER — ACETAMINOPHEN 10 MG/ML
1000 INJECTION, SOLUTION INTRAVENOUS
Status: COMPLETED | OUTPATIENT
Start: 2024-11-19 | End: 2024-11-19

## 2024-11-19 RX ORDER — SODIUM CHLORIDE 9 MG/ML
INJECTION INTRAVENOUS AS NEEDED
Status: DISCONTINUED | OUTPATIENT
Start: 2024-11-19 | End: 2024-11-19 | Stop reason: HOSPADM

## 2024-11-19 RX ORDER — BUPIVACAINE HYDROCHLORIDE 5 MG/ML
INJECTION, SOLUTION EPIDURAL; INTRACAUDAL AS NEEDED
Status: DISCONTINUED | OUTPATIENT
Start: 2024-11-19 | End: 2024-11-19 | Stop reason: HOSPADM

## 2024-11-19 RX ADMIN — CEFAZOLIN SODIUM 2000 MG: 2 SOLUTION INTRAVENOUS at 14:17

## 2024-11-19 RX ADMIN — PROPOFOL 150 MG: 10 INJECTION, EMULSION INTRAVENOUS at 14:10

## 2024-11-19 RX ADMIN — FENTANYL CITRATE 25 MCG: 50 INJECTION, SOLUTION INTRAMUSCULAR; INTRAVENOUS at 14:12

## 2024-11-19 RX ADMIN — FENTANYL CITRATE 25 MCG: 50 INJECTION, SOLUTION INTRAMUSCULAR; INTRAVENOUS at 14:18

## 2024-11-19 RX ADMIN — LIDOCAINE HYDROCHLORIDE 60 MG: 20 INJECTION, SOLUTION EPIDURAL; INFILTRATION; INTRACAUDAL at 14:10

## 2024-11-19 RX ADMIN — FENTANYL CITRATE 50 MCG: 50 INJECTION, SOLUTION INTRAMUSCULAR; INTRAVENOUS at 14:31

## 2024-11-19 RX ADMIN — DEXAMETHASONE SODIUM PHOSPHATE 10 MG: 10 INJECTION INTRAMUSCULAR; INTRAVENOUS at 14:11

## 2024-11-19 RX ADMIN — ONDANSETRON 4 MG: 2 INJECTION INTRAMUSCULAR; INTRAVENOUS at 14:43

## 2024-11-19 RX ADMIN — ACETAMINOPHEN 1000 MG: 10 INJECTION INTRAVENOUS at 10:13

## 2024-11-19 RX ADMIN — SODIUM CHLORIDE 125 ML/HR: 0.9 INJECTION, SOLUTION INTRAVENOUS at 10:14

## 2024-11-19 RX ADMIN — TRAMADOL HYDROCHLORIDE 50 MG: 50 TABLET, COATED ORAL at 17:09

## 2024-11-19 NOTE — INTERVAL H&P NOTE
H&P reviewed. After examining the patient I find no changes in the patients condition since the H&P had been written.    Vitals:    11/19/24 1014   BP: 143/69   Pulse: 76   Resp: 16   Temp: 97.6 °F (36.4 °C)   SpO2: 95%

## 2024-11-19 NOTE — OP NOTE
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 LYMPHOSCINTIGRAPHY. & LYMPHATIC MAPPING;  Injection of blue dye  Use of gamma probe  Use of justyn   Specimen radiograph    Specimen(s):  ID Type Source Tests Collected by Time Destination   1 : Right breast Lumpectomy, short superior Long Lateral Tissue Breast, Right TISSUE EXAM Keila Rodriguez MD 2024 1444    2 : Right Breast  New Medial Margin, Suture marks true margin. Tissue Breast, Right TISSUE EXAM Keila Rodriguez MD 2024 1446    3 : Right Breast New Inferior margin, suture marks true margin. Tissue Breast, Right TISSUE EXAM Keila Rodriguez MD 2024 1447    4 : [place] Node #1/ Clipped Node. 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  She was counseled on a right lumpectomy.  She was also counseled on a sentinel node biopsy/removal of clipped axillary nodes secondary to the suspicious nature and possible invasion on needle biopsy.  She presented the day of surgery to the radiology suite and underwent  lymphoscintigraphy of the right breast.  From there she went to the operating room.  She had preoperative antibiotics.  She was administered general anesthesia.  She had a 5 cc injection of methylene to saline dilution in the subareolar plexus.  She was prepped and draped in the usual standard fashion.  Timeout was performed.  Attention was turned to the outer right breast.  The Jessica reflector was identified in the 8-9 o'clock axis.  The skin was marked in this location.  Half percent Marcaine plain was injected for local anesthesia.  An elliptical incision was created through the skin and subcutaneous tissue.  Electrocautery was used to dissect margins superior, medial, inferior, lateral and posterior.  The savvy probe was used throughout to help guide dissection.  The specimen was interrogated to confirm reflector removal.  It was also imaged in the operating room revealing the Jessica reflector closest to the medial, inferior and posterior margins.  Therefore new margins were excised in these locations and sutures were placed on the true margins.  All breast specimens were submitted to pathology in formalin.  Attention was turned to the right axilla.  The savvy probe was again used to identify the previously biopsied suspicious node.  The skin was marked in this location.  Additional half percent Marcaine plain was injected for local anesthesia.  An incision was created through the skin and subcutaneous tissue.  Electrocautery was used to dissect through the remaining subcutaneous tissue and clavipectoral fascia to enter the axillary fat pad.  Deep in the mid axilla was an enlarged node containing the Jessica reflector.  This was excised and imaged in the OR confirming removal of the reflector.  The node was also radioactive in nature.  This was submitted as sentinel node 1/clipped node.  There were no additional radioactive, blue stained or palpable nodes.  Both of her wounds were irrigated and hemostasis was  achieved.  Hemoclips were placed within the lumpectomy cavity.  The wounds were then closed in a layered fashion using multiple interrupted 3-0 Monocryl suture and a running 4-0 Monocryl subcuticular stitch.  All counts were correct.  The skin was cleaned and dried.  Surgical glue, fluffs and a bra were applied.  The patient was then extubated and taken to recovery in stable condition.       Patient Disposition:  extubated and stable    [place] Node Biopsy for Breast Cancer - Right  Operation performed with curative intent. Yes   Tracer(s) used to identify sentinel nodes in the upfront surgery (non-neoadjuvant) setting (select all that apply). Dye and Radioactive tracer   Tracer(s) used to identify sentinel nodes in the neoadjuvant setting (select all that apply). N/A   All nodes (colored or non-colored) present at the end of a dye-filled lymphatic channel were removed. N/A   All significantly radioactive nodes were removed. Yes   All palpably suspicious nodes were removed. Yes   Biopsy-proven positive nodes marked with clips prior to chemotherapy were identified and removed. N/A                 SIGNATURE: Keila Rodriguez MD  DATE: November 19, 2024  TIME: 3:33 PM

## 2024-11-19 NOTE — DISCHARGE INSTR - AVS FIRST PAGE
POST-OPERATIVE CARE INSTRUCTIONS       Care after your procedure:   General  Rest and relax for 24 hours, then gradually return to normal activities.  Do not perform any heavy lifting or strenuous physical activities for 14 days.  Your activity restrictions will be re-evaluated at your post op visit.  Drink clear liquids until you are certain there is no nausea, then resume a normal diet.  Do not drink alcohol, drive any vehicle, operate mechanical equipment or make critical decisions for at least 24 hours and until you are off any narcotic pain medications.  The Incision  Your incision is closed with:   dissolvable stiches just underneath the skin.                The incision is also covered with:                          clear waterproof glue  A gauze-pad is covering the wound.  Wound care  Remove your gauze-pad after 24 hours.   You may then shower using soap and water to clean your incision. Gently dry the wound.  You may redress your wound with additional gauze and tape if you choose.  A little bruising at the wound site is normal.    Medication  Resume all previous medications  Pain Medication Instructions: over the counter tylenol          Other (If applicable)  Wear a post-surgical bra around the clock.  May use ice to the incision site(s) for the next 24-48 hours, twice daily.   Call your  doctor if you have any of the following:  Redness, swelling, heat, drainage, and/or bleeding from your wound  Chills or fever ( above 101' F )  Pain, not relieved with the above medications  If you have any questions or problems call our office [phone]    Follow-up appointment:  As scheduled

## 2024-11-19 NOTE — ANESTHESIA PREPROCEDURE EVALUATION
Procedure:  RIGHT KRISTIN LOCALIZED LUMPECTOMY W/  SENTINEL LYMPH NODE BX, LYMPHOSCINTIGRAPHY, & LYMPHATIC MAPPING; (Right: Breast)    Relevant Problems   ANESTHESIA (within normal limits)      CARDIO  EFT VENTRICLE: Size was normal. Systolic function was normal. Ejection fraction was estimated to be 60 %. There were no regional wall motion abnormalities. Wall thickness was normal. DOPPLER: Doppler parameters were consistent with  abnormal left ventricular relaxation (grade 1 diastolic dysfunction).     RIGHT VENTRICLE: The size was normal. Systolic function was normal. Wall thickness was normal.     LEFT ATRIUM: The atrium was moderately dilated.     ATRIAL SEPTUM: The septum bows from left to right, consistent with increased left atrial pressure.     RIGHT ATRIUM: Size was normal.     MITRAL VALVE: Valve structure was normal. There was normal leaflet separation. DOPPLER: The transmitral velocity was within the normal range. There was no evidence for stenosis. There was mild regurgitation.     AORTIC VALVE: The valve was trileaflet. Leaflets exhibited normal thickness and normal cuspal separation. DOPPLER: Transaortic velocity was within the normal range. There was no evidence for stenosis. There was no significant  regurgitation.     TRICUSPID VALVE: The valve structure was normal. There was normal leaflet separation. DOPPLER: The transtricuspid velocity was within the normal range. There was no evidence for stenosis. There was trace regurgitation. Estimated peak PA  pressure was 32 mmHg.     PULMONIC VALVE: Leaflets exhibited normal thickness, no calcification, and normal cuspal separation. DOPPLER: The transpulmonic velocity was within the normal range. There was trace regurgitation.        (+) Hypertension      ENDO  BMI 40      GI/HEPATIC   (+) GERD (gastroesophageal reflux disease)      MUSCULOSKELETAL   (+) Arthritis   (+) Intrinsic muscle tightness      PULMONARY   (+) Obstructive sleep apnea      Endocrine    (+) Diabetes mellitus (HCC)   (+) Disease of thyroid gland      Rheumatology   (+) Traumatic tear of supraspinatus tendon of left shoulder        Physical Exam    Airway    Mallampati score: II         Dental   No notable dental hx     Cardiovascular  Cardiovascular exam normal    Pulmonary  Pulmonary exam normal     Other Findings  post-pubertal.      Anesthesia Plan  ASA Score- 3     Anesthesia Type- general with ASA Monitors.         Additional Monitors:     Airway Plan: LMA.    Comment: 8:15  water with meds.       Plan Factors-    Chart reviewed. EKG reviewed. Imaging results reviewed. Existing labs reviewed. Patient summary reviewed.                  Induction- intravenous.    Postoperative Plan-         Informed Consent- Anesthetic plan and risks discussed with patient.

## 2024-11-19 NOTE — ANESTHESIA POSTPROCEDURE EVALUATION
Post-Op Assessment Note    CV Status:  Stable  Pain Score: 0    Pain management: adequate    Multimodal analgesia used between 6 hours prior to anesthesia start to PACU discharge    Mental Status:  Sleepy and arousable   Hydration Status:  Stable   PONV Controlled:  None   Airway Patency:  Patent  Airway: intubated (LMA)     Post Op Vitals Reviewed: Yes    No anethesia notable event occurred.    Staff: Anesthesiologist, CRNA           Last Filed PACU Vitals:  Vitals Value Taken Time   Temp     Pulse 80 11/19/24 1554   /69 11/19/24 1553   Resp     SpO2 92 % 11/19/24 1554   Vitals shown include unfiled device data.    Modified Nancy:  No data recorded

## 2024-11-19 NOTE — ANESTHESIA POSTPROCEDURE EVALUATION
Post-Op Assessment Note    CV Status:  Stable  Pain Score: 1    Pain management: adequate       Mental Status:  Alert and awake   Hydration Status:  Euvolemic   PONV Controlled:  Controlled   Airway Patency:  Patent     Post Op Vitals Reviewed: Yes    No anethesia notable event occurred.    Staff: Anesthesiologist           Last Filed PACU Vitals:  Vitals Value Taken Time   Temp 97.4 °F (36.3 °C) 11/19/24 1553   Pulse 78 11/19/24 1612   /61 11/19/24 1608   Resp 16 11/19/24 1608   SpO2 92 % 11/19/24 1612   Vitals shown include unfiled device data.    Modified Nancy:  Activity: 2 (11/19/2024  4:08 PM)  Respiration: 2 (11/19/2024  4:08 PM)  Circulation: 2 (11/19/2024  4:08 PM)  Consciousness: 1 (11/19/2024  4:08 PM)  Oxygen Saturation: 2 (11/19/2024  4:08 PM)  Modified Nancy Score: 9 (11/19/2024  4:08 PM)

## 2024-11-22 ENCOUNTER — TELEPHONE (OUTPATIENT)
Age: 84
End: 2024-11-22

## 2024-11-22 NOTE — TELEPHONE ENCOUNTER
How does the incision look? WNL, patient stated that the skin around incision is slightly reddened.  Patient is not able to take picture and upload to [employer].  Patient denies any swelling and/or warmth to area.  Instructed patient to call if redness increases, area is warm to touch, incision begins to drain yellow/greenish, foul smelling liquid, and/or develops fever.  Patient verbalized understanding.        Do you have fever or chills? No    Are you having any pain? No, denies    Do you have a drain(s)? No    Verify post-op appointment date and time  [x]    Do you have any other questions or concerns? No, denies.  Verified patient has number to call should she need it.      **NOTE TO TRIAGER: If patient requires further triage, based upon the answers above, move to appropriate triage protocol.

## 2024-11-24 ENCOUNTER — HOME HEALTH ADMISSION (OUTPATIENT)
Dept: HOME HEALTH SERVICES | Facility: HOME HEALTHCARE | Age: 84
End: 2024-11-24
Payer: MEDICARE

## 2024-11-24 ENCOUNTER — HOSPITAL ENCOUNTER (EMERGENCY)
Facility: HOSPITAL | Age: 84
Discharge: HOME | End: 2024-11-24
Attending: EMERGENCY MEDICINE
Payer: MEDICARE

## 2024-11-24 VITALS
OXYGEN SATURATION: 97 % | TEMPERATURE: 97.9 F | HEART RATE: 80 BPM | DIASTOLIC BLOOD PRESSURE: 81 MMHG | BODY MASS INDEX: 40.98 KG/M2 | WEIGHT: 222.66 LBS | RESPIRATION RATE: 16 BRPM | HEIGHT: 62 IN | SYSTOLIC BLOOD PRESSURE: 165 MMHG

## 2024-11-24 DIAGNOSIS — T81.89XA PROBLEM INVOLVING SURGICAL INCISION: Primary | ICD-10-CM

## 2024-11-24 PROBLEM — T81.30XA WOUND DEHISCENCE: Status: ACTIVE | Noted: 2024-11-24

## 2024-11-24 PROCEDURE — 99284 EMERGENCY DEPT VISIT MOD MDM: CPT | Performed by: EMERGENCY MEDICINE

## 2024-11-24 PROCEDURE — 99282 EMERGENCY DEPT VISIT SF MDM: CPT

## 2024-11-24 PROCEDURE — NC001 PR NO CHARGE: Performed by: STUDENT IN AN ORGANIZED HEALTH CARE EDUCATION/TRAINING PROGRAM

## 2024-11-24 RX ORDER — LIDOCAINE HYDROCHLORIDE AND EPINEPHRINE 10; 10 MG/ML; UG/ML
20 INJECTION, SOLUTION INFILTRATION; PERINEURAL ONCE
Status: COMPLETED | OUTPATIENT
Start: 2024-11-24 | End: 2024-11-24

## 2024-11-24 RX ADMIN — LIDOCAINE HYDROCHLORIDE,EPINEPHRINE BITARTRATE 20 ML: 10; .01 INJECTION, SOLUTION INFILTRATION; PERINEURAL at 14:36

## 2024-11-24 NOTE — CONSULTS
"Consultation - Oncology-Surgical   Name: Tanya Cassidy 84 y.o. female I MRN: [id number]  Unit/Bed#: ED-40 I Date of Admission: 11/24/2024   Date of Service: 11/24/2024 I Hospital Day: 0   Consults  Physician Requesting Evaluation: James Bailon*   Reason for Evaluation / Principal Problem: Postop wound complication    Assessment & Plan  Wound dehiscence  Status post right lumpectomy and sentinel lymph node biopsy 11/19, now with wound dehiscence earlier today    Plan  -Partial closure with packing in ED today  -VNA for packing changes   Daily packing changes  -Call and schedule follow-up tomorrow with the surgical oncology office  -Safe for discharge from the ED  Ok for discharge from Oncology-Surgical service perspective.    History of Present Illness   Tanya Cassidy is a 84 y.o. female who presents after her wound \"opened up\" overnight or this morning.  Patient reports she was doing well postoperatively, her pain was well-controlled on minimal medication.  Patient denies nausea vomiting fevers chills and shortness of breath.  Patient reports either overnight or this morning she felt a pulling sensation in her axilla.  The wound was noted to be open by family member.  Patient came in to the emergency department for evaluation.  Patient reports he is otherwise doing well postoperatively.    Past medical history is significant for intraductal papillary carcinoma for which she underwent right lumpectomy and sentinel lymph node biopsy on 11/19.  Other past medical history significant for diabetes, hypertension, hyperlipidemia and thyroid disease.    Review of Systems   Constitutional:  Negative for activity change, chills, fatigue, fever and unexpected weight change.   Gastrointestinal:  Negative for abdominal distention.     I have reviewed the patient's PMH, PSH, Social History, Family History, Meds, and Allergies  Historical Information   Past Medical History:   Diagnosis Date    Ambulates with cane  "    Arthritis     Cancer (HCC)     right breast    Chronic pain disorder     legs-hands-back-all joints    Diabetes mellitus (HCC)     type 2    Disease of thyroid gland     GERD (gastroesophageal reflux disease)     Headache     Hemorrhoids     History of left breast cancer     History of pneumonia as a child     Hx of radiation therapy 03/09/2012    PBRT    Hyperlipidemia     Hypertension     Muscle weakness     at time-feels related to arthritis    Night sweats     Rheumatoid arthritis (HCC)     Risk for falls     Rotator cuff injury     Sleep apnea     Trigger finger     right and left hand    Urinary incontinence     uses depends    Use of anastrozole (Arimidex)     Use of letrozole (Femara)     Wears glasses      Past Surgical History:   Procedure Laterality Date    ABDOMINAL SURGERY      APPENDECTOMY      BREAST BIOPSY Left 01/162012    [facility]    BREAST BIOPSY Right 10/08/2024    BREAST LUMPECTOMY Left 02/17/2012    BREAST SURGERY      CATARACT EXTRACTION      CHOLECYSTECTOMY      COLONOSCOPY      FOOT SURGERY      right and left foot-screws implanted    HYSTERECTOMY      JOINT REPLACEMENT Bilateral     knee replacements    MD BX/EXC LYMPH NODE OPEN SUPERFICIAL Right 11/19/2024    Procedure: RIGHT KRISTIN LOCALIZED LUMPECTOMY W/  SENTINEL LYMPH NODE BX, LYMPHOSCINTIGRAPHY, & LYMPHATIC MAPPING;;  Surgeon: Keila Rodriguez MD;  Location: [facility];  Service: Surgical Oncology    MD EXC BREAST LES PREOP PLMT RAD MARKER OPEN 1 LES Right 11/19/2024    Procedure: RIGHT KRISTIN LOCALIZED LUMPECTOMY W/  SENTINEL LYMPH NODE BX, LYMPHOSCINTIGRAPHY, & LYMPHATIC MAPPING;;  Surgeon: Keila Rodriguez MD;  [facility];  Service: Surgical Oncology    MD INTRAOP SENTINEL LYMPH NODE ID W/DYE INJECTION Right 11/19/2024    Procedure: RIGHT KRISTIN LOCALIZED LUMPECTOMY W/  SENTINEL LYMPH NODE BX, LYMPHOSCINTIGRAPHY, & LYMPHATIC MAPPING;;  Surgeon: Keila Rodriguez MD;  [facility];  Service: Surgical Oncology    MD MASTECTOMY  PARTIAL Right 11/19/2024    Procedure: RIGHT KRISTIN LOCALIZED LUMPECTOMY W/  SENTINEL LYMPH NODE BX, LYMPHOSCINTIGRAPHY, & LYMPHATIC MAPPING;;  Surgeon: Keila Rodriguez MD;  [facility]: [facility];  Service: Surgical Oncology    IL TENDON SHEATH INCISION Right 08/07/2018    Procedure: RELEASE TRIGGER FINGER - LONG FINGER AND RING FINGER;  Surgeon: Domingo Meyers MD;  [facility]: [facility];  Service: Orthopedics    IL TENDON SHEATH INCISION Left 08/14/2018    Procedure: RELEASE TRIGGER FINGER - LEFT INDEX FINGER;  Surgeon: Domingo Meyers MD;  Location: [facility];  Service: Orthopedics    REPAIR RECTOCELE      TONSILLECTOMY      TUBAL LIGATION      US GUIDED BREAST BIOPSY RIGHT COMPLETE Right 10/08/2024    US GUIDED BREAST LYMPH NODE BIOPSY RIGHT Right 10/08/2024    US GUIDED THYROID BIOPSY  03/30/2017     Social History     Tobacco Use    Smoking status: Never    Smokeless tobacco: Never   Vaping Use    Vaping status: Never Used   Substance and Sexual Activity    Alcohol use: Yes     Comment: socially    Drug use: No    Sexual activity: Not on file     Comment: defer     E-Cigarette/Vaping    E-Cigarette Use Never User      E-Cigarette/Vaping Substances    Nicotine No     THC No     CBD No     Flavoring No     Other No     Unknown No      Family History   Problem Relation Age of Onset    Prostate cancer Father         age at dx unk    Prostate cancer Brother 73    Thyroid cancer Brother         age at dx unk    Skin cancer Brother     Thyroid cancer Daughter 45        age at dx unk    Breast cancer Maternal Aunt 69        age at dx unk    Lung cancer Maternal Uncle         age at dx unk    No Known Problems Son     No Known Problems Son     No Known Problems Son     Pancreatic cancer Maternal Aunt         unknown age     Social History     Tobacco Use    Smoking status: Never    Smokeless tobacco: Never   Vaping Use    Vaping status: Never Used   Substance and Sexual Activity    Alcohol use: Yes     Comment:  socially    Drug use: No    Sexual activity: Not on file     Comment: defer     No current facility-administered medications for this encounter.  Prior to Admission Medications   Prescriptions Last Dose Informant Patient Reported? Taking?   Lancets (OneTouch Delica Plus [id number]) MISC  Self Yes No   Sig: TEST BLOOD GLUCOSE DAILY   Multiple Vitamin (MULTIVITAMIN) tablet  Self Yes No   Sig: Take 1 tablet by mouth daily.   OneTouch Verio test strip  Self Yes No   Sig: use 1 TEST STRIP to TEST BLOOD SUGAR once daily   SYNTHROID 88 MCG tablet  Self Yes No   acetaminophen (TYLENOL 8 HOUR) 650 mg CR tablet  Self No No   Sig: Take 1 tablet (650 mg total) by mouth every 8 (eight) hours   Patient taking differently: Take 650 mg by mouth every 8 (eight) hours as needed for moderate pain   amLODIPine (NORVASC) 10 mg tablet  Self Yes No   Sig: Take 10 mg by mouth daily.   calcium citrate-vitamin D (CITRACAL+D) 315-200 MG-UNIT per tablet  Self Yes No   Sig: Take 1 tablet by mouth daily.   doxycycline hyclate (VIBRAMYCIN) 100 mg capsule  Self Yes No   Sig: Take 100 mg by mouth 2 (two) times a day   Patient not taking: Reported on 2021   furosemide (LASIX) 40 mg tablet  Self Yes No   Sig: Take 40 mg by mouth daily.   lidocaine (LIDODERM) 5 %   No No   Sig: Apply 1 patch topically daily for 15 days Remove & Discard patch within 12 hours or as directed by MD   Patient not taking: Reported on 2021   losartan (COZAAR) 100 MG tablet  Self Yes No   Sig: Take 100 mg by mouth daily.   metFORMIN (GLUCOPHAGE) 500 mg tablet   Yes No   Si mg 2 (two) times a day with meals   mupirocin (BACTROBAN) 2 % ointment  Self Yes No   Sig: APPLY A SMALL AMOUNT TO THE AFFECTED AREA THREE TIMES DAILY   Patient not taking: No sig reported   pantoprazole (PROTONIX) 40 mg tablet   No No   Sig: Take 1 tablet (40 mg total) by mouth daily in the early morning   Patient not taking: Reported on 2021   traMADol (Ultram) 50 mg tablet   No No  "  Sig: Take 1 tablet (50 mg total) by mouth every 6 (six) hours as needed for severe pain      Facility-Administered Medications: None       Objective :  Temp:  [97.9 °F (36.6 °C)] 97.9 °F (36.6 °C)  HR:  [80-88] 80  BP: (165-195)/(81-89) 165/81  Resp:  [16-18] 16  SpO2:  [97 %] 97 %  O2 Device: None (Room air)      Physical Exam  Vitals and nursing note reviewed.   Constitutional:       General: She is not in acute distress.     Appearance: Normal appearance. She is normal weight. She is not ill-appearing or toxic-appearing.   HENT:      Head: Normocephalic and atraumatic.   Eyes:      General: No scleral icterus.     Conjunctiva/sclera: Conjunctivae normal.   Cardiovascular:      Rate and Rhythm: Normal rate.   Pulmonary:      Effort: Pulmonary effort is normal.      Comments: On room air  Abdominal:      General: Abdomen is flat. There is no distension.      Palpations: Abdomen is soft.      Tenderness: There is no abdominal tenderness. There is no guarding or rebound.   Musculoskeletal:      Right lower leg: No edema.      Left lower leg: No edema.   Skin:     General: Skin is warm and dry.      Capillary Refill: Capillary refill takes less than 2 seconds.      Comments: Right lateral breast incision clean dry and intact with skin glue.  No surrounding erythema.  Right axillary incision, open, without erythema, serous drainage, see image below   Neurological:      Mental Status: She is alert and oriented to person, place, and time.   Psychiatric:         Mood and Affect: Mood normal.         Behavior: Behavior normal.         Thought Content: Thought content normal.     Right axillary incision    Right axillary incision after partial closure and packing        Lab Results: I have reviewed the following results:  No results for input(s): \"WBC\", \"HGB\", \"HCT\", \"PLT\", \"BANDSPCT\", \"SODIUM\", \"K\", \"CL\", \"CO2\", \"BUN\", \"CREATININE\", \"GLUC\", \"CAIONIZED\", \"MG\", \"PHOS\", \"AST\", \"ALT\", \"ALB\", \"TBILI\", \"DBILI\", \"ALKPHOS\", " "\"PTT\", \"INR\", \"HSTNI0\", \"HSTNI2\", \"BNP\", \"LACTICACID\" in the last 72 hours.    Imaging Results Review: No pertinent imaging studies reviewed.  Other Study Results Review: No additional pertinent studies reviewed.    VTE Pharmacologic Prophylaxis: VTE covered by:    None     VTE Mechanical Prophylaxis: sequential compression device    Administrative Statements     "

## 2024-11-24 NOTE — ASSESSMENT & PLAN NOTE
Status post right lumpectomy and sentinel lymph node biopsy 11/19, now with wound dehiscence earlier today    Plan  -Partial closure with packing in ED today  -VNA for packing changes   Daily packing changes  -Call and schedule follow-up tomorrow with the surgical oncology office  -Safe for discharge from the ED

## 2024-11-24 NOTE — ED PROVIDER NOTES
Time reflects when diagnosis was documented in both MDM as applicable and the Disposition within this note       Time User Action Codes Description Comment    11/24/2024  2:37 PM James Bailon Add [K43.2] Recurrent incisional hernia with complication     11/24/2024  2:37 PM James Bailon Remove [K43.2] Recurrent incisional hernia with complication     11/24/2024  2:37 PM James Bailon Add [T81.89XA] Problem involving surgical incision           ED Disposition       ED Disposition   Discharge    Condition   Stable    Date/Time   Sun Nov 24, 2024  2:37 PM    Comment   Tanya LEE Cassidy discharge to home/self care.                   Assessment & Plan       Medical Decision Making  84-year-old female presents with an open incisional wound from last week following a that does not appear infected and is not actively bleeding, will be discussed with surgery for further recommendations.    Surgery saw the patient at the bedside, reports they are going to reinsert some loose incisions to hold together, packed the wound, and have the patient discharged and follow-up with the surgical clinic as an outpatient for the next few days.  The patient is comfortable with treatment plan and discharge plan and is safe for discharge.    Disposition: Patient stable for outpatient management. Discussed need for follow up with their primary doctor or specialist to review all results, including incidental findings as below. Patient discharged with explanation of ED workup and diagnosis, instructions on how to obtain outpatient follow up, care instructions at home, and strict return precautions if patient develops new or worsening symptoms. Patients questions answered and agreeable with discharge plan.        PLEASE NOTE:  This encounter was completed utilizing the [employer] Direct Speech Voice Recognition Software. Grammatical errors, random word insertions, pronoun errors and incomplete sentences are occasional  inherent consequences of the system due to software limitations, ambient noise and hardware issues.These may be missed by proof reading prior to affixing electronic signature. Any questions or concerns about the content, text or information contained within the body of this dictation should be directly addressed to the physician for clarification. Please do not hesitate to call me directly if you have any questions or concerns.               Medications   lidocaine-epinephrine (XYLOCAINE/EPINEPHRINE) 1 %-1:100,000 injection 20 mL (20 mL Infiltration Given 11/24/24 1436)       ED Risk Strat Scores               History of Present Illness       Chief Complaint   Patient presents with    Post-op Problem     Pt reports having lumpectomy on Tuesday to right arm, reports open and oozing clear fluid. No foul smell reports. No pain or other complaints per pt.        Past Medical History:   Diagnosis Date    Ambulates with cane     Arthritis     Cancer (HCC)     right breast    Chronic pain disorder     legs-hands-back-all joints    Diabetes mellitus (HCC)     type 2    Disease of thyroid gland     GERD (gastroesophageal reflux disease)     Headache     Hemorrhoids     History of left breast cancer     History of pneumonia as a child     Hx of radiation therapy 03/09/2012    PBRT    Hyperlipidemia     Hypertension     Muscle weakness     at time-feels related to arthritis    Night sweats     Rheumatoid arthritis (HCC)     Risk for falls     Rotator cuff injury     Sleep apnea     Trigger finger     right and left hand    Urinary incontinence     uses depends    Use of anastrozole (Arimidex)     Use of letrozole (Femara)     Wears glasses       Past Surgical History:   Procedure Laterality Date    ABDOMINAL SURGERY      APPENDECTOMY      BREAST BIOPSY Left 01/162012    [facility]    BREAST BIOPSY Right 10/08/2024    BREAST LUMPECTOMY Left 02/17/2012    BREAST SURGERY      CATARACT EXTRACTION      CHOLECYSTECTOMY      COLONOSCOPY       FOOT SURGERY      right and left foot-screws implanted    HYSTERECTOMY      JOINT REPLACEMENT Bilateral     knee replacements    WV BX/EXC LYMPH NODE OPEN SUPERFICIAL Right 11/19/2024    Procedure: RIGHT KRISTIN LOCALIZED LUMPECTOMY W/  SENTINEL LYMPH NODE BX, LYMPHOSCINTIGRAPHY, & LYMPHATIC MAPPING;;  Surgeon: Keila Rodriguez MD;  [facility];  Service: Surgical Oncology    WV EXC BREAST LES PREOP PLMT RAD MARKER OPEN 1 LES Right 11/19/2024    Procedure: RIGHT KRISTIN LOCALIZED LUMPECTOMY W/  SENTINEL LYMPH NODE BX, LYMPHOSCINTIGRAPHY, & LYMPHATIC MAPPING;;  Surgeon: Keila Rodriguez MD;  [facility];  Service: Surgical Oncology    WV INTRAOP SENTINEL LYMPH NODE ID W/DYE INJECTION Right 11/19/2024    Procedure: RIGHT KRISTIN LOCALIZED LUMPECTOMY W/  SENTINEL LYMPH NODE BX, LYMPHOSCINTIGRAPHY, & LYMPHATIC MAPPING;;  Surgeon: Keila Rodriguez MD;  [facility];  Service: Surgical Oncology    WV MASTECTOMY PARTIAL Right 11/19/2024    Procedure: RIGHT KRISTIN LOCALIZED LUMPECTOMY W/  SENTINEL LYMPH NODE BX, LYMPHOSCINTIGRAPHY, & LYMPHATIC MAPPING;;  Surgeon: Keila Rodriguez MD;  [facility];  Service: Surgical Oncology    WV TENDON SHEATH INCISION Right 08/07/2018    Procedure: RELEASE TRIGGER FINGER - LONG FINGER AND RING FINGER;  Surgeon: Domingo Meyers MD;  [facility];  Service: Orthopedics    WV TENDON SHEATH INCISION Left 08/14/2018    Procedure: RELEASE TRIGGER FINGER - LEFT INDEX FINGER;  Surgeon: Domingo Meyers MD;  [facility];  Service: Orthopedics    REPAIR RECTOCELE      TONSILLECTOMY      TUBAL LIGATION      US GUIDED BREAST BIOPSY RIGHT COMPLETE Right 10/08/2024    US GUIDED BREAST LYMPH NODE BIOPSY RIGHT Right 10/08/2024    US GUIDED THYROID BIOPSY  03/30/2017      Family History   Problem Relation Age of Onset    Prostate cancer Father         age at dx unk    Prostate cancer Brother 73    Thyroid cancer Brother         age at dx unk    Skin cancer Brother      Thyroid cancer Daughter 45        age at dx unk    Breast cancer Maternal Aunt 69        age at dx unk    Lung cancer Maternal Uncle         age at dx unk    No Known Problems Son     No Known Problems Son     No Known Problems Son     Pancreatic cancer Maternal Aunt         unknown age      Social History     Tobacco Use    Smoking status: Never    Smokeless tobacco: Never   Vaping Use    Vaping status: Never Used   Substance Use Topics    Alcohol use: Yes     Comment: socially    Drug use: No      E-Cigarette/Vaping    E-Cigarette Use Never User       E-Cigarette/Vaping Substances    Nicotine No     THC No     CBD No     Flavoring No     Other No     Unknown No       I have reviewed and agree with the history as documented.     84-year-old female presents after she had a right breast lump ectomy and into a right axillary lymph node resection for biopsy 6 days ago, reports that she noticed that the incision where the lymph node resection was done is now open, draining straw-colored clear fluid, reports she is not in any pain, and has not had any fevers, bleeding, purulence, swelling, no recent trauma, and no other complaints.  She reports her surgeon who did the procedure was Dr. Rodriguez.        Review of Systems   Constitutional:  Negative for fever.   Respiratory:  Negative for cough and shortness of breath.    Cardiovascular:  Negative for chest pain.   Gastrointestinal:  Negative for abdominal pain, constipation, diarrhea, nausea and vomiting.   Genitourinary:  Negative for dysuria and hematuria.   Skin:  Positive for wound.   Neurological:  Negative for light-headedness and headaches.           Objective       ED Triage Vitals [11/24/24 1205]   Temperature Pulse Blood Pressure Respirations SpO2 Patient Position - Orthostatic VS   97.9 °F (36.6 °C) 88 (!) 195/89 18 97 % Sitting      Temp Source Heart Rate Source BP Location FiO2 (%) Pain Score    Oral Monitor Left arm -- --      Vitals      Date and Time Temp  Pulse SpO2 Resp BP Pain Score FACES Pain Rating User   11/24/24 1445 -- 80 97 % 16 165/81 -- -- SB   11/24/24 1205 97.9 °F (36.6 °C) 88 97 % 18 195/89 -- -- RI            Physical Exam  Vitals and nursing note reviewed.   Constitutional:       General: She is not in acute distress.     Appearance: Normal appearance. She is well-developed.   HENT:      Head: Normocephalic and atraumatic.   Eyes:      Extraocular Movements: Extraocular movements intact.      Conjunctiva/sclera: Conjunctivae normal.   Cardiovascular:      Rate and Rhythm: Normal rate.   Pulmonary:      Effort: Pulmonary effort is normal. No respiratory distress.   Abdominal:      General: There is no distension.   Musculoskeletal:         General: No swelling.      Cervical back: Normal range of motion.   Skin:     General: Skin is warm and dry.      Capillary Refill: Capillary refill takes less than 2 seconds.      Comments: Surgical incisions over the right inferior breast and right anterior axillary region, the breast incision appears clean dry and intact, and is still closed with skin glue, the right axillary incision is open, with some minimal straw-colored nonpurulent nonopaque fluid.   Neurological:      General: No focal deficit present.      Mental Status: She is alert and oriented to person, place, and time.   Psychiatric:         Mood and Affect: Mood normal.         Results Reviewed       None            No orders to display       Procedures    ED Medication and Procedure Management   Prior to Admission Medications   Prescriptions Last Dose Informant Patient Reported? Taking?   Lancets (OneTouch Delica Plus [id number]) MISC  Self Yes No   Sig: TEST BLOOD GLUCOSE DAILY   Multiple Vitamin (MULTIVITAMIN) tablet  Self Yes No   Sig: Take 1 tablet by mouth daily.   OneTouch Verio test strip  Self Yes No   Sig: use 1 TEST STRIP to TEST BLOOD SUGAR once daily   SYNTHROID 88 MCG tablet  Self Yes No   acetaminophen (TYLENOL 8 HOUR) 650 mg CR tablet   Self No No   Sig: Take 1 tablet (650 mg total) by mouth every 8 (eight) hours   Patient taking differently: Take 650 mg by mouth every 8 (eight) hours as needed for moderate pain   amLODIPine (NORVASC) 10 mg tablet  Self Yes No   Sig: Take 10 mg by mouth daily.   calcium citrate-vitamin D (CITRACAL+D) 315-200 MG-UNIT per tablet  Self Yes No   Sig: Take 1 tablet by mouth daily.   doxycycline hyclate (VIBRAMYCIN) 100 mg capsule  Self Yes No   Sig: Take 100 mg by mouth 2 (two) times a day   Patient not taking: Reported on 2021   furosemide (LASIX) 40 mg tablet  Self Yes No   Sig: Take 40 mg by mouth daily.   lidocaine (LIDODERM) 5 %   No No   Sig: Apply 1 patch topically daily for 15 days Remove & Discard patch within 12 hours or as directed by MD   Patient not taking: Reported on 2021   losartan (COZAAR) 100 MG tablet  Self Yes No   Sig: Take 100 mg by mouth daily.   metFORMIN (GLUCOPHAGE) 500 mg tablet   Yes No   Si mg 2 (two) times a day with meals   mupirocin (BACTROBAN) 2 % ointment  Self Yes No   Sig: APPLY A SMALL AMOUNT TO THE AFFECTED AREA THREE TIMES DAILY   Patient not taking: No sig reported   pantoprazole (PROTONIX) 40 mg tablet   No No   Sig: Take 1 tablet (40 mg total) by mouth daily in the early morning   Patient not taking: Reported on 2021   traMADol (Ultram) 50 mg tablet   No No   Sig: Take 1 tablet (50 mg total) by mouth every 6 (six) hours as needed for severe pain      Facility-Administered Medications: None     Discharge Medication List as of 2024  2:38 PM        CONTINUE these medications which have NOT CHANGED    Details   acetaminophen (TYLENOL 8 HOUR) 650 mg CR tablet Take 1 tablet (650 mg total) by mouth every 8 (eight) hours, Starting 2018, Normal      amLODIPine (NORVASC) 10 mg tablet Take 10 mg by mouth daily., Historical Med      calcium citrate-vitamin D (CITRACAL+D) 315-200 MG-UNIT per tablet Take 1 tablet by mouth daily., Historical Med       doxycycline hyclate (VIBRAMYCIN) 100 mg capsule Take 100 mg by mouth 2 (two) times a day, Starting Fri 6/4/2021, Historical Med      furosemide (LASIX) 40 mg tablet Take 40 mg by mouth daily., Historical Med      Lancets (OneTouch Delica Plus [id number]) MISC TEST BLOOD GLUCOSE DAILY, Historical Med      lidocaine (LIDODERM) 5 % Apply 1 patch topically daily for 15 days Remove & Discard patch within 12 hours or as directed by MD, Starting Sun 4/4/2021, Until Mon 4/19/2021, Normal      losartan (COZAAR) 100 MG tablet Take 100 mg by mouth daily., Historical Med      metFORMIN (GLUCOPHAGE) 500 mg tablet 500 mg 2 (two) times a day with meals, Starting Wed 10/2/2024, Historical Med      Multiple Vitamin (MULTIVITAMIN) tablet Take 1 tablet by mouth daily., Historical Med      mupirocin (BACTROBAN) 2 % ointment APPLY A SMALL AMOUNT TO THE AFFECTED AREA THREE TIMES DAILY, Historical Med      OneTouch Verio test strip use 1 TEST STRIP to TEST BLOOD SUGAR once daily, Historical Med      pantoprazole (PROTONIX) 40 mg tablet Take 1 tablet (40 mg total) by mouth daily in the early morning, Starting Sun 4/4/2021, Until Thu 11/4/2021, Normal      SYNTHROID 88 MCG tablet Starting Wed 9/26/2018, Historical Med      traMADol (Ultram) 50 mg tablet Take 1 tablet (50 mg total) by mouth every 6 (six) hours as needed for severe pain, Starting Thu 11/7/2024, Normal           No discharge procedures on file.  ED SEPSIS DOCUMENTATION   Time reflects when diagnosis was documented in both MDM as applicable and the Disposition within this note       Time User Action Codes Description Comment    11/24/2024  2:37 PM James Bailon Add [K43.2] Recurrent incisional hernia with complication     11/24/2024  2:37 PM James Bailon Remove [K43.2] Recurrent incisional hernia with complication     11/24/2024  2:37 PM James Bailon Add [T81.89XA] Problem involving surgical incision                  James Bailon MD  11/24/24  1740

## 2024-11-24 NOTE — DISCHARGE INSTR - AVS FIRST PAGE
General Surgery Discharge Instructions    Please follow-up as instructed.    Activity:  - No lifting greater than 20 pounds or strenuous physical activity or exercise for at least 2 weeks.  - Walking and normal light activities are encouraged.  - Normal daily activities including climbing steps are okay.  - No driving until no longer using pain medications.    Diet:    - You may resume your normal diet.    Wound Care:  -Right sided axillary incision is closed with 3 nylon sutures.  These may be removed in 7 to 10 days in the outpatient office.  Additionally, at the lateral aspect of the wound, half-inch packing was placed.  Packing will need daily changes by visiting nursing.    Medications:    - You should continue your current medication regimen after discharge unless otherwise instructed. Please refer to your discharge medication list for further details.  - Please take the pain medications as directed.  - You may become constipated, especially if taking pain medications. You may take any over the counter stool softeners or laxatives as needed. Examples: Milk of Magnesia, Colace, Senna.    Additional Instructions:  - If you have any questions or concerns after discharge please call the office.  - Call office or return to ER if fever greater than 101, chills, persistent nausea/vomiting, worsening/uncontrollable pain, and/or increasing redness or purulent/foul smelling drainage from incision(s).

## 2024-11-24 NOTE — CASE MANAGEMENT
Case Management Progress Note    Patient name Tanya Cassidy  Location ED-40/ED-40 MRN [id number]  : 1940 Date 2024       LOS (days): 0  Geometric Mean LOS (GMLOS) (days):   Days to GMLOS:        OBJECTIVE:        Current admission status: Emergency  Preferred Pharmacy:   [facility] [address] [place] [address] [address] [facility]  [address] [facility] [address]  Phone: [phone] Fax: [phone]    [facility] [place] - [address]  [address]  [place] [address]  Phone: [phone] Fax: [phone]    [facility] [address] [facility]  [address] [facility] [address]  Phone: [phone] Fax: [phone]    [id number] - [place] - [address] [facility]  [address] [facility] [address]  Phone: [phone] Fax: [phone]    [place], [place] - [address]  [address]  [address]  [place] [address]  Phone: [phone] Fax: [phone]    Primary Care Provider: Willy Doan MD    Primary Insurance: MEDICARE  Secondary Insurance: [facility]    PROGRESS NOTE:    Cm advised patient would need [facility] for wound care for daily packing. Cm submitted blanket referral into [facility]. Per [facility], more documentation needed. CM advised provider in Epic chat. [facility] may need cosign by attending before services can start.     [facility] advised if attending cannot cosign patient may need to go to Dr Rodriguez's office or [facility] now for wound care and get order that way by a doctor. CM came down to ED to discuss with patient but patient already dc'ed.     CM left Vm on patients number requesting call back to discuss [facility]

## 2024-11-24 NOTE — CASE MANAGEMENT
Case Management Progress Note    Patient name Tanya Cassidy  Location ED-40/ED-40 MRN [id number]  : 1940 Date 2024       LOS (days): 0  Geometric Mean LOS (GMLOS) (days):   Days to GMLOS:        OBJECTIVE:        Current admission status: Emergency  Preferred Pharmacy:   [facility] [address] [place] [address] [address] [facility]  [address] [place] [address]  Phone: [phone] Fax: [phone]    [facility] [place]  [place] - [address]  [address]  [place] [address]  Phone: [phone] Fax: [phone]    [facility] - [facility] [address] [facility]  [address] [facility] [address]  Phone: [phone] Fax: [phone]    [id number] - [place] - [address] [facility]  [address] [facility] [address]  Phone: [phone] Fax: [phone]    [place], [place] - [address]  [address]  [place] [address]  Phone: [phone] Fax: [phone]    Primary Care Provider: Willy Doan MD    Primary Insurance: MEDICARE  Secondary Insurance: [facility]    PROGRESS NOTE:    Per [facility]- able to accept patient as patient would need to keep appointment with Dr Rodriguez's office. They can start services tomorrow. CM left VM on patients home phone advising [facility] able to start services tomorrow. CM left call back number if needed.

## 2024-11-25 ENCOUNTER — TELEPHONE (OUTPATIENT)
Dept: SURGICAL ONCOLOGY | Facility: CLINIC | Age: 84
End: 2024-11-25

## 2024-11-25 ENCOUNTER — TELEPHONE (OUTPATIENT)
Age: 84
End: 2024-11-25

## 2024-11-25 ENCOUNTER — HOME CARE VISIT (OUTPATIENT)
Dept: HOME HEALTH SERVICES | Facility: HOME HEALTHCARE | Age: 84
End: 2024-11-25
Payer: MEDICARE

## 2024-11-25 VITALS
OXYGEN SATURATION: 97 % | SYSTOLIC BLOOD PRESSURE: 136 MMHG | HEART RATE: 87 BPM | RESPIRATION RATE: 20 BRPM | TEMPERATURE: 97.9 F | DIASTOLIC BLOOD PRESSURE: 74 MMHG

## 2024-11-25 PROCEDURE — G0299 HHS/HOSPICE OF RN EA 15 MIN: HCPCS

## 2024-11-25 PROCEDURE — 400013 VN SOC

## 2024-11-25 PROCEDURE — 10330081 VN NO-PAY CLAIM PROCEDURE

## 2024-11-25 NOTE — TELEPHONE ENCOUNTER
Returned call placed to the patient, I left a message on her vm reporting that Dr. Rodriguez's office said it is ok to see her on 11/27 at 1400 at the [place] location.  I left the hope line number if she had any further questions.

## 2024-11-25 NOTE — TELEPHONE ENCOUNTER
Called pt at this time to make aware that we received a message from the ED doc she saw over the weekend about her incision. I LM for her to return our call and that we have an appt saved for her on Wed 11/27 at 1 pm. Return phone number provided for call back.

## 2024-11-25 NOTE — TELEPHONE ENCOUNTER
Patient reports PO 11/19/24 right lumpectomy with Dr. Rodriguez.  Pt presented to the ED 11/24/24 with c/o incision wound draining.  Pt reports 3 sutures placed to surgical incision with packing and instructions to follow up with Dr. Jennifer garrettp.  Pt reports will have a visiting nurse come today for follow up.  Patient calling to schedule asap appointment with Dr. Rodriguez.  I scheduled her for 11/27 at 1400.  Pt is requesting earlier appointment, today if possible.

## 2024-11-26 ENCOUNTER — HOME CARE VISIT (OUTPATIENT)
Dept: HOME HEALTH SERVICES | Facility: HOME HEALTHCARE | Age: 84
End: 2024-11-26
Payer: MEDICARE

## 2024-11-26 VITALS
SYSTOLIC BLOOD PRESSURE: 140 MMHG | DIASTOLIC BLOOD PRESSURE: 74 MMHG | OXYGEN SATURATION: 97 % | TEMPERATURE: 98.1 F | RESPIRATION RATE: 20 BRPM | HEART RATE: 94 BPM

## 2024-11-26 PROCEDURE — 10330064 TAPE, ADHSV TRANSPORE WHT 1 (12RL/BX 10"

## 2024-11-26 PROCEDURE — 10330064 SALINE, IRR SOL STR 100ML (48/CS) MGM37

## 2024-11-26 PROCEDURE — 10330064 PAD, ABD 5X9 STR LF (1/PK 20PK/BX) MGM1"

## 2024-11-26 PROCEDURE — G0299 HHS/HOSPICE OF RN EA 15 MIN: HCPCS

## 2024-11-26 PROCEDURE — 10330064 SPONGE, GAUZE 12PLY STR 4X4" (1/PK 50/B"

## 2024-11-26 PROCEDURE — 10330064 PACKING STRIP, NASAL PLAIN STRLF 1/2X5Y"

## 2024-11-27 ENCOUNTER — TELEPHONE (OUTPATIENT)
Dept: GENETICS | Facility: CLINIC | Age: 84
End: 2024-11-27

## 2024-11-27 ENCOUNTER — OFFICE VISIT (OUTPATIENT)
Dept: SURGICAL ONCOLOGY | Facility: CLINIC | Age: 84
End: 2024-11-27

## 2024-11-27 ENCOUNTER — PATIENT OUTREACH (OUTPATIENT)
Dept: HEMATOLOGY ONCOLOGY | Facility: CLINIC | Age: 84
End: 2024-11-27

## 2024-11-27 VITALS
BODY MASS INDEX: 40.48 KG/M2 | OXYGEN SATURATION: 97 % | TEMPERATURE: 97.3 F | HEART RATE: 87 BPM | HEIGHT: 62 IN | WEIGHT: 220 LBS | DIASTOLIC BLOOD PRESSURE: 72 MMHG | SYSTOLIC BLOOD PRESSURE: 128 MMHG

## 2024-11-27 DIAGNOSIS — D05.11 DUCTAL CARCINOMA IN SITU (DCIS) OF RIGHT BREAST: Primary | ICD-10-CM

## 2024-11-27 LAB
GENE DIS ANL INTERP-IMP: NORMAL
INTERPRETATION: NORMAL

## 2024-11-27 PROCEDURE — 88307 TISSUE EXAM BY PATHOLOGIST: CPT | Performed by: PATHOLOGY

## 2024-11-27 PROCEDURE — 99024 POSTOP FOLLOW-UP VISIT: CPT | Performed by: SURGERY

## 2024-11-27 NOTE — PROGRESS NOTES
Breast Oncology Patient Navigator    Met with patient at her appointment with Dr. Rodriguez     Offered support and answered questions when appropriate.    Patient given our team's business cards.  Patient has my contact information and knows to reach out with further questions or concerns.    Patient mentioned having mild discomfort from the tape on her breast.    Patient mentioned she has been eating and drinking well ans still has a good support system at home.    Mentioned to patient to please reach out if she has any questions/concerns.

## 2024-11-27 NOTE — TELEPHONE ENCOUNTER
Genetic Test Result Note    Summary:    Result Disclosure:   Today I spoke with the patient's daughter (Mary) over the phone to review the results of Tanya's genetic test for hereditary cancer. She underwent genetic testing through our program on 11/7/2024 due to her recent diagnosis of breast cancer.    A separate report of her STAT genetic test results were disclosed to her on 11/18/2024. This result includes new genes and does not change her initial genetic test result.     Test Performed: [employer] BRCAplus STAT Panel (13 genes): RUTH ANN, BARD1, BRCA1, BRCA2, CDH1, CHEK2, NF1, PALB2, PTEN, RAD51C, RAD51D, STK11, TP53 with reflex to [employer] CustomNext: Cancer+RNAinsight (59 genes): APC, RUTH ANN, AXIN2, BAP1, BARD1, BMPR1A, BRCA1, BRCA2, BRIP1, CDH1, CDK4, CDKN1B, CDKN2A, CHEK2, CTNNA1, DICER1, EGLN1, EPCAM, FH, FLCN, GREM1, HOXB13, KIF1B, KIT, MAX, MEN1, MET, MITF, MLH1, MSH2, MSH3, MSH6, MUTYH, NF1, NTHL1, PALB2, PDGFRA PMS2, POLD1, POLE, POT1, PTEN, RAD51C, RAD51D, RB1, RET, SDHA, SDHAF2, SDHB, SDHC, SDHD, SMAD4, SMARCA4, STK11, NSMJ208, TP53, TSC1, TSC2, VHL     Result: NEGATIVE: No Clinically Significant Variants Detected    Assessment:   A negative result significantly reduces the likelihood that Tanya has a hereditary cancer syndrome. However, this testing is unable to completely rule out the presence of hereditary cancer. It remains possible that:  There is a variant in an area of a gene which was not tested or there is a variant not detectable due to technical limitations of this test.     There is a variant in another gene that was not included in this test or in a gene not known to be linked to cancer or tumors.   A family member has a genetic variant that the patient did not inherit.   The cancer in the family is sporadic and is related to non-hereditary factors.     Risks and Testing for Family Members:  Tanya was made aware that all of her first-degree relatives are at increased risk to develop breast  cancer based on her recent diagnosis and family history of breast cancer. We recommend that her first-degree relatives make their healthcare providers aware of the family history and discuss their options for screening and risk-reduction.    At this time we do not recommend testing for Tanya 's children based on her negative test result.  Tanya 's children still need to consider the history of cancer on the other side of their family when determining their risks.     If Tanya has any affected family members with a cancer diagnosis, especially at a young age, they may still consider genetic testing. Relatives who wish to pursue genetic testing can reach out to the Cancer Risk and Genetics Program at [phone] to schedule an appointment or visit [email or web address] to identify a local genetic counselor.       Plan:     Negative Result: This result does not have surgical implications and surgical options should be discussed with her healthcare provider. Tanya's breast surgeon, Dr. Rodriguez will be made aware of her results

## 2024-11-27 NOTE — PROGRESS NOTES
84 y.o. female is here today s/p right lumpectomy and sentinel node biopsy. She reports drainage from the axillary incision.      Physical Exam  Constitutional:       General: She is not in acute distress.     Appearance: Normal appearance.   Chest:   Breasts:     Right: Skin change (Breast incision is intact and very well healed, axillary incision with sutures and packing at the inferior aspect) present.      Comments: Packing was removed from the axillary incision with copious serous fluid that drained out, no evidence of infection, wound was repacked  Neurological:      Mental Status: She is alert and oriented to person, place, and time.   Psychiatric:         Mood and Affect: Mood normal.               Diagnoses and all orders for this visit:    Ductal carcinoma in situ (DCIS) of right breast        Assessment/Plan: 84-year-old female status post right lumpectomy and sentinel node biopsy for a papillary carcinoma could not rule out invasion.  Final pathology reveals DCIS with negative lymph node.  Her breast site is healing well.  The axillary incision opened up likely secondary to pressure from the absence of seroma.  This was repacked today in the office.  She was given wound care instructions for home.  I will see her again next week as scheduled.

## 2024-11-28 ENCOUNTER — HOME CARE VISIT (OUTPATIENT)
Dept: HOME HEALTH SERVICES | Facility: HOME HEALTHCARE | Age: 84
End: 2024-11-28
Payer: MEDICARE

## 2024-11-28 VITALS
RESPIRATION RATE: 16 BRPM | HEART RATE: 92 BPM | TEMPERATURE: 98.2 F | OXYGEN SATURATION: 96 % | SYSTOLIC BLOOD PRESSURE: 110 MMHG | DIASTOLIC BLOOD PRESSURE: 66 MMHG

## 2024-11-28 PROCEDURE — G0299 HHS/HOSPICE OF RN EA 15 MIN: HCPCS

## 2024-11-29 ENCOUNTER — HOME CARE VISIT (OUTPATIENT)
Dept: HOME HEALTH SERVICES | Facility: HOME HEALTHCARE | Age: 84
End: 2024-11-29
Payer: MEDICARE

## 2024-11-29 VITALS
DIASTOLIC BLOOD PRESSURE: 64 MMHG | HEART RATE: 94 BPM | OXYGEN SATURATION: 97 % | TEMPERATURE: 98.2 F | RESPIRATION RATE: 16 BRPM | SYSTOLIC BLOOD PRESSURE: 124 MMHG

## 2024-11-29 PROCEDURE — G0299 HHS/HOSPICE OF RN EA 15 MIN: HCPCS

## 2024-11-30 ENCOUNTER — HOME CARE VISIT (OUTPATIENT)
Dept: HOME HEALTH SERVICES | Facility: HOME HEALTHCARE | Age: 84
End: 2024-11-30
Payer: MEDICARE

## 2024-11-30 VITALS
OXYGEN SATURATION: 97 % | DIASTOLIC BLOOD PRESSURE: 80 MMHG | RESPIRATION RATE: 18 BRPM | SYSTOLIC BLOOD PRESSURE: 138 MMHG | TEMPERATURE: 97.5 F | HEART RATE: 86 BPM

## 2024-11-30 PROCEDURE — G0299 HHS/HOSPICE OF RN EA 15 MIN: HCPCS

## 2024-12-01 ENCOUNTER — HOME CARE VISIT (OUTPATIENT)
Dept: HOME HEALTH SERVICES | Facility: HOME HEALTHCARE | Age: 84
End: 2024-12-01
Payer: MEDICARE

## 2024-12-01 VITALS
DIASTOLIC BLOOD PRESSURE: 80 MMHG | SYSTOLIC BLOOD PRESSURE: 130 MMHG | TEMPERATURE: 97.3 F | OXYGEN SATURATION: 97 % | HEART RATE: 87 BPM | RESPIRATION RATE: 18 BRPM

## 2024-12-01 PROCEDURE — G0299 HHS/HOSPICE OF RN EA 15 MIN: HCPCS

## 2024-12-02 ENCOUNTER — HOME CARE VISIT (OUTPATIENT)
Dept: HOME HEALTH SERVICES | Facility: HOME HEALTHCARE | Age: 84
End: 2024-12-02
Payer: MEDICARE

## 2024-12-02 PROCEDURE — G0299 HHS/HOSPICE OF RN EA 15 MIN: HCPCS

## 2024-12-02 NOTE — PROCEDURES
Right axillary wound partial closure    Patient identified both verbally and by wristband in the emergency department.  The wound was copiously irrigated with saline and cleaned with Betadine. 1% lidocaine with epinephrine was injected subcutaneously adjacent to the right axillary wound.  5 cc in total was used.  3 vertical mattress sutures were placed for partial closure of the wound with 2-0 nylon suture.  The wound was packed with half-inch gauze and a clean dressing was placed over the wound.  Patient tolerated the procedure well with no immediate complications.

## 2024-12-03 ENCOUNTER — HOME CARE VISIT (OUTPATIENT)
Dept: HOME HEALTH SERVICES | Facility: HOME HEALTHCARE | Age: 84
End: 2024-12-03
Payer: MEDICARE

## 2024-12-03 VITALS
SYSTOLIC BLOOD PRESSURE: 138 MMHG | TEMPERATURE: 98.5 F | DIASTOLIC BLOOD PRESSURE: 72 MMHG | OXYGEN SATURATION: 98 % | HEART RATE: 86 BPM

## 2024-12-03 PROCEDURE — G0299 HHS/HOSPICE OF RN EA 15 MIN: HCPCS

## 2024-12-03 PROCEDURE — G0180 MD CERTIFICATION HHA PATIENT: HCPCS | Performed by: SURGERY

## 2024-12-04 ENCOUNTER — HOME CARE VISIT (OUTPATIENT)
Dept: HOME HEALTH SERVICES | Facility: HOME HEALTHCARE | Age: 84
End: 2024-12-04
Payer: MEDICARE

## 2024-12-04 VITALS
DIASTOLIC BLOOD PRESSURE: 72 MMHG | OXYGEN SATURATION: 98 % | SYSTOLIC BLOOD PRESSURE: 120 MMHG | HEART RATE: 76 BPM | RESPIRATION RATE: 18 BRPM | TEMPERATURE: 98.2 F

## 2024-12-04 PROCEDURE — 10330064 SPONGE, GAUZE 8PLY N/S 4X4" (200/PK 20P"

## 2024-12-04 PROCEDURE — G0300 HHS/HOSPICE OF LPN EA 15 MIN: HCPCS

## 2024-12-04 PROCEDURE — 10330064 PAD, ABD 5X9 STR LF (1/PK 20PK/BX) MGM1"

## 2024-12-04 NOTE — CASE COMMUNICATION
Ship to Pt. Home    Ordering MD SANDRA Rodriguez    Wound 1      Full     Frequency  daily        Gauze 4x4 N/S 200 4x4s per unit  [id number]     1    ABD 5x9 [id number]    14

## 2024-12-05 ENCOUNTER — HOME CARE VISIT (OUTPATIENT)
Dept: HOME HEALTH SERVICES | Facility: HOME HEALTHCARE | Age: 84
End: 2024-12-05
Payer: MEDICARE

## 2024-12-05 ENCOUNTER — OFFICE VISIT (OUTPATIENT)
Dept: SURGICAL ONCOLOGY | Facility: CLINIC | Age: 84
End: 2024-12-05

## 2024-12-05 VITALS
DIASTOLIC BLOOD PRESSURE: 82 MMHG | OXYGEN SATURATION: 96 % | HEART RATE: 91 BPM | HEIGHT: 62 IN | WEIGHT: 224 LBS | BODY MASS INDEX: 41.22 KG/M2 | TEMPERATURE: 98.3 F | SYSTOLIC BLOOD PRESSURE: 128 MMHG

## 2024-12-05 DIAGNOSIS — Z13.71 BRCA GENE MUTATION NEGATIVE IN FEMALE: ICD-10-CM

## 2024-12-05 DIAGNOSIS — T81.30XA WOUND DEHISCENCE: Primary | ICD-10-CM

## 2024-12-05 PROCEDURE — 99024 POSTOP FOLLOW-UP VISIT: CPT | Performed by: SURGERY

## 2024-12-05 NOTE — PROGRESS NOTES
Patient is here today for another wound check.  Continues to have packing done by the visiting nurse.  Continues to have drainage.  States that she feels well overall.    Exam the breast incision is healing quite nicely.  The axillary incision has extensive irritation around the sutures which were removed today in the office.  The tape burns are also improving.  The packing was removed with serous fluid obtained, much smaller amount than last time.    Wound was repacked with half-inch plain packing.  Antibiotic ointment was applied to the remaining portion of the incision line as well as the tape burn.  Will see her again in 1 week.

## 2024-12-06 ENCOUNTER — HOME CARE VISIT (OUTPATIENT)
Dept: HOME HEALTH SERVICES | Facility: HOME HEALTHCARE | Age: 84
End: 2024-12-06
Payer: MEDICARE

## 2024-12-06 VITALS
OXYGEN SATURATION: 97 % | RESPIRATION RATE: 20 BRPM | DIASTOLIC BLOOD PRESSURE: 64 MMHG | TEMPERATURE: 98.2 F | SYSTOLIC BLOOD PRESSURE: 130 MMHG | HEART RATE: 90 BPM

## 2024-12-06 PROCEDURE — G0299 HHS/HOSPICE OF RN EA 15 MIN: HCPCS

## 2024-12-07 ENCOUNTER — HOME CARE VISIT (OUTPATIENT)
Dept: HOME HEALTH SERVICES | Facility: HOME HEALTHCARE | Age: 84
End: 2024-12-07
Payer: MEDICARE

## 2024-12-07 VITALS
RESPIRATION RATE: 18 BRPM | SYSTOLIC BLOOD PRESSURE: 132 MMHG | DIASTOLIC BLOOD PRESSURE: 75 MMHG | HEART RATE: 86 BPM | TEMPERATURE: 97 F | OXYGEN SATURATION: 99 %

## 2024-12-07 PROCEDURE — G0299 HHS/HOSPICE OF RN EA 15 MIN: HCPCS

## 2024-12-07 PROCEDURE — 10330064 PAD, ABD 5X9 STR LF (1/PK 20PK/BX) MGM1"

## 2024-12-08 ENCOUNTER — HOME CARE VISIT (OUTPATIENT)
Dept: HOME HEALTH SERVICES | Facility: HOME HEALTHCARE | Age: 84
End: 2024-12-08
Payer: MEDICARE

## 2024-12-08 VITALS
TEMPERATURE: 97.9 F | HEART RATE: 85 BPM | DIASTOLIC BLOOD PRESSURE: 75 MMHG | OXYGEN SATURATION: 97 % | RESPIRATION RATE: 18 BRPM | SYSTOLIC BLOOD PRESSURE: 140 MMHG

## 2024-12-08 PROCEDURE — G0299 HHS/HOSPICE OF RN EA 15 MIN: HCPCS

## 2024-12-09 ENCOUNTER — HOME CARE VISIT (OUTPATIENT)
Dept: HOME HEALTH SERVICES | Facility: HOME HEALTHCARE | Age: 84
End: 2024-12-09
Payer: MEDICARE

## 2024-12-09 VITALS
OXYGEN SATURATION: 96 % | DIASTOLIC BLOOD PRESSURE: 76 MMHG | RESPIRATION RATE: 16 BRPM | HEART RATE: 62 BPM | TEMPERATURE: 98.5 F | SYSTOLIC BLOOD PRESSURE: 124 MMHG

## 2024-12-09 PROCEDURE — G0299 HHS/HOSPICE OF RN EA 15 MIN: HCPCS

## 2024-12-10 ENCOUNTER — HOME CARE VISIT (OUTPATIENT)
Dept: HOME HEALTH SERVICES | Facility: HOME HEALTHCARE | Age: 84
End: 2024-12-10
Payer: MEDICARE

## 2024-12-10 VITALS
TEMPERATURE: 97.9 F | RESPIRATION RATE: 16 BRPM | HEART RATE: 76 BPM | SYSTOLIC BLOOD PRESSURE: 128 MMHG | OXYGEN SATURATION: 94 % | DIASTOLIC BLOOD PRESSURE: 70 MMHG

## 2024-12-10 PROCEDURE — G0299 HHS/HOSPICE OF RN EA 15 MIN: HCPCS

## 2024-12-11 ENCOUNTER — HOME CARE VISIT (OUTPATIENT)
Dept: HOME HEALTH SERVICES | Facility: HOME HEALTHCARE | Age: 84
End: 2024-12-11
Payer: MEDICARE

## 2024-12-11 VITALS
DIASTOLIC BLOOD PRESSURE: 60 MMHG | RESPIRATION RATE: 16 BRPM | OXYGEN SATURATION: 97 % | SYSTOLIC BLOOD PRESSURE: 114 MMHG | HEART RATE: 88 BPM | TEMPERATURE: 98.4 F

## 2024-12-11 PROCEDURE — G0299 HHS/HOSPICE OF RN EA 15 MIN: HCPCS

## 2024-12-12 ENCOUNTER — HOME CARE VISIT (OUTPATIENT)
Dept: HOME HEALTH SERVICES | Facility: HOME HEALTHCARE | Age: 84
End: 2024-12-12
Payer: MEDICARE

## 2024-12-12 ENCOUNTER — OFFICE VISIT (OUTPATIENT)
Dept: SURGICAL ONCOLOGY | Facility: CLINIC | Age: 84
End: 2024-12-12

## 2024-12-12 VITALS
RESPIRATION RATE: 15 BRPM | DIASTOLIC BLOOD PRESSURE: 80 MMHG | HEART RATE: 75 BPM | BODY MASS INDEX: 41.22 KG/M2 | OXYGEN SATURATION: 96 % | WEIGHT: 224 LBS | HEIGHT: 62 IN | SYSTOLIC BLOOD PRESSURE: 114 MMHG | TEMPERATURE: 95.9 F

## 2024-12-12 DIAGNOSIS — T81.30XA WOUND DEHISCENCE: Primary | ICD-10-CM

## 2024-12-12 PROCEDURE — 99024 POSTOP FOLLOW-UP VISIT: CPT | Performed by: SURGERY

## 2024-12-12 NOTE — PROGRESS NOTES
Patient is here today for another wound check.  The skin irritation from the sutures as well as the tape burn are markedly improved with use of Aquaphor.  Packing material was removed with serous fluid obtained.  She continues to have mild swelling.  I therefore repacked the wound.  We will see her in 1 week.  We also discussed follow-up care.  Given patient age and DCIS only on final pathology, we decided on observation only.

## 2024-12-13 ENCOUNTER — HOME CARE VISIT (OUTPATIENT)
Dept: HOME HEALTH SERVICES | Facility: HOME HEALTHCARE | Age: 84
End: 2024-12-13
Payer: MEDICARE

## 2024-12-13 VITALS
SYSTOLIC BLOOD PRESSURE: 108 MMHG | TEMPERATURE: 98.5 F | RESPIRATION RATE: 16 BRPM | DIASTOLIC BLOOD PRESSURE: 60 MMHG | HEART RATE: 94 BPM | OXYGEN SATURATION: 95 %

## 2024-12-13 PROCEDURE — G0299 HHS/HOSPICE OF RN EA 15 MIN: HCPCS

## 2024-12-14 ENCOUNTER — HOME CARE VISIT (OUTPATIENT)
Dept: HOME HEALTH SERVICES | Facility: HOME HEALTHCARE | Age: 84
End: 2024-12-14
Payer: MEDICARE

## 2024-12-14 VITALS
SYSTOLIC BLOOD PRESSURE: 130 MMHG | DIASTOLIC BLOOD PRESSURE: 80 MMHG | OXYGEN SATURATION: 97 % | TEMPERATURE: 97.2 F | RESPIRATION RATE: 18 BRPM | HEART RATE: 85 BPM

## 2024-12-14 PROCEDURE — G0299 HHS/HOSPICE OF RN EA 15 MIN: HCPCS

## 2024-12-15 ENCOUNTER — HOME CARE VISIT (OUTPATIENT)
Dept: HOME HEALTH SERVICES | Facility: HOME HEALTHCARE | Age: 84
End: 2024-12-15
Payer: MEDICARE

## 2024-12-15 VITALS
OXYGEN SATURATION: 99 % | DIASTOLIC BLOOD PRESSURE: 80 MMHG | TEMPERATURE: 97.6 F | RESPIRATION RATE: 18 BRPM | SYSTOLIC BLOOD PRESSURE: 136 MMHG | HEART RATE: 84 BPM

## 2024-12-15 PROCEDURE — G0299 HHS/HOSPICE OF RN EA 15 MIN: HCPCS

## 2024-12-16 ENCOUNTER — HOME CARE VISIT (OUTPATIENT)
Dept: HOME HEALTH SERVICES | Facility: HOME HEALTHCARE | Age: 84
End: 2024-12-16
Payer: MEDICARE

## 2024-12-16 VITALS
SYSTOLIC BLOOD PRESSURE: 108 MMHG | DIASTOLIC BLOOD PRESSURE: 56 MMHG | RESPIRATION RATE: 20 BRPM | TEMPERATURE: 98.5 F | OXYGEN SATURATION: 97 % | HEART RATE: 88 BPM

## 2024-12-16 PROCEDURE — G0299 HHS/HOSPICE OF RN EA 15 MIN: HCPCS

## 2024-12-17 ENCOUNTER — HOME CARE VISIT (OUTPATIENT)
Dept: HOME HEALTH SERVICES | Facility: HOME HEALTHCARE | Age: 84
End: 2024-12-17
Payer: MEDICARE

## 2024-12-17 VITALS
HEART RATE: 88 BPM | SYSTOLIC BLOOD PRESSURE: 118 MMHG | TEMPERATURE: 98.1 F | DIASTOLIC BLOOD PRESSURE: 74 MMHG | RESPIRATION RATE: 16 BRPM | OXYGEN SATURATION: 97 %

## 2024-12-17 PROCEDURE — G0299 HHS/HOSPICE OF RN EA 15 MIN: HCPCS

## 2024-12-18 ENCOUNTER — HOME CARE VISIT (OUTPATIENT)
Dept: HOME HEALTH SERVICES | Facility: HOME HEALTHCARE | Age: 84
End: 2024-12-18
Payer: MEDICARE

## 2024-12-18 PROCEDURE — G0299 HHS/HOSPICE OF RN EA 15 MIN: HCPCS

## 2024-12-19 ENCOUNTER — HOME CARE VISIT (OUTPATIENT)
Dept: HOME HEALTH SERVICES | Facility: HOME HEALTHCARE | Age: 84
End: 2024-12-19
Payer: MEDICARE

## 2024-12-19 ENCOUNTER — OFFICE VISIT (OUTPATIENT)
Dept: SURGICAL ONCOLOGY | Facility: CLINIC | Age: 84
End: 2024-12-19

## 2024-12-19 VITALS
SYSTOLIC BLOOD PRESSURE: 134 MMHG | HEIGHT: 62 IN | OXYGEN SATURATION: 96 % | DIASTOLIC BLOOD PRESSURE: 76 MMHG | BODY MASS INDEX: 41.22 KG/M2 | WEIGHT: 224 LBS | TEMPERATURE: 98 F | HEART RATE: 87 BPM

## 2024-12-19 DIAGNOSIS — Z48.89 ENCOUNTER FOR POSTOPERATIVE WOUND CARE: ICD-10-CM

## 2024-12-19 DIAGNOSIS — D05.11 DUCTAL CARCINOMA IN SITU (DCIS) OF RIGHT BREAST: Primary | ICD-10-CM

## 2024-12-19 PROCEDURE — 99024 POSTOP FOLLOW-UP VISIT: CPT

## 2024-12-19 NOTE — LETTER
2024     Keila Rodriguez MD  [address] [facility]  [address] [facility] [address]    Patient: Tanya Cassidy   YOB: 1940   Date of Visit: 2024       Dear Dr. Rodriguez:    Thank you for referring Tanya Cassidy to me for evaluation. Below are my notes for this consultation.    If you have questions, please do not hesitate to call me. I look forward to following your patient along with you.         Sincerely,        MAUREEN Villa        CC: No Recipients    MAUREEN Villa  2024 11:37 AM  Sign when Signing Visit  Name: Tanya Cassidy      : 1940      MRN: [id number]  Encounter Provider: MAUREEN Villa  Encounter Date: 2024   Encounter department: [facility] [place]  :  Assessment & Plan  Ductal carcinoma in situ (DCIS) of right breast  Breast and axillary wounds continue to heal well.  Axillary incision packing was replaced today and I will see her again in 1 week for re-evaluation. We will plan for her to see Dr Rodriguez in 6 months for a surveillance visit.       Encounter for postoperative wound care               History of Present Illness  Tanya Cassidy is a 84 y.o. year old female who presents today for wound care.  She is 1 months s/p right breast lumpectomy with sentinel lymph node biopsy.  She unfortunately experienced dehiscence of the axillary incision, and is being seen by [facility] for daily packing changes.  She denies any redness, swelling, fever or chills.  She reports only some scant bloody drainage from the wound.        Oncology History  Oncology History   History of left breast cancer   2012 Biopsy    Left breast stereotactic biopsy  Invasive ductal carcinoma  Grade 1  , , HER2 2+ FISH negative     2012 Surgery    Left lumpectomy with SLN biopsy (Dr. Rodriguez)  Invasive ductal carcinoma  4 mm  0/1 Lymph node     3/19/2012 -  Radiation    PBRT  Dr Huizar,  Dr Mckee     2012 - 2013  Hormone Therapy    Anastrozole 4/2012 - 8/2013  Letrozole 8/2013 - 12/2013 (D/C due to joint aches)  Dr Barron     Ductal carcinoma in situ (DCIS) of right breast   10/8/2024 Biopsy    Right breast ultrasound-guided biopsy  A. 9 o'clock, 3 cm from nipple (JESSICA)  Papillary carcinoma  Grade 2  Invasion is not identified in this sample; favored to represent an encapsulated papillary carcinoma, however, complete classification should be reserved for excisional sample    B. Right axillary lymph node (JESSICA)  One benign lymph node, negative for carcinoma    Concordant. Right malignancy appears unifocal; suspicious masses cover an area up to 6 mm on US. Right axilla negative after benign biopsy. Left breast clear on 2/2024 screening mammo.      10/8/2024 -  Cancer Staged    Staging form: Breast, AJCC 8th Edition  - Clinical stage from 10/8/2024: Stage 0 (cTis (DCIS), cN0(f), cM0, ER+, [place]-, HER2: Not Assessed) - Signed by Keila Rodriguez MD on 11/27/2024  Stage prefix: Initial diagnosis  Method of lymph node assessment: Core biopsy  Nuclear grade: G2       11/7/2024 Genetic Testing    Patient has genetic testing done for breast cancer.   [facility]    RESULTS NEGATIVE     11/19/2024 Surgery    RIGHT Jessica Localized Lumpectomy with [place] Lymph node Biopsy  Ducal Carcinoma In Situ, papillary and cribiform type  9 oclock, 3 cmfn  Grade 2  20 mm  Path Stage: pTis, pN0    SLN #1/clipped node: negative for carcinoma  Margins benign    ER %  [place] negative       11/19/2024 -  Cancer Staged    Staging form: Breast, AJCC 8th Edition  - Pathologic stage from 11/19/2024: Stage 0 (pTis (DCIS), pN0(sn), cM0, ER+, [place]-) - Signed by Keila Rodriguez MD on 11/27/2024  Stage prefix: Initial diagnosis  Method of lymph node assessment: [place] lymph node biopsy  Nuclear grade: G2          Review of Systems A complete review of systems is negative other than that noted above in the HPI.           Objective  /76   Pulse 87   Temp  "98 °F (36.7 °C)   Ht 5' 2\" (1.575 m)   Wt 102 kg (224 lb)   LMP  (LMP Unknown)   SpO2 96%   BMI 40.97 kg/m²     ECOG    Physical Exam  Vitals reviewed.   Constitutional:       General: She is not in acute distress.     Appearance: Normal appearance. She is not ill-appearing, toxic-appearing or diaphoretic.   HENT:      Head: Normocephalic.   Eyes:      General: No scleral icterus.  Cardiovascular:      Rate and Rhythm: Normal rate.   Pulmonary:      Effort: Pulmonary effort is normal.   Chest:      Comments: Right breast incision remains intact.  Right axillary wound is open with 1/4\" deep cavity.  There is no redness, swelling or drainage present.  1/2\" packing was removed and replaced  with 1/4\" iodoform packing.  Musculoskeletal:         General: No swelling or tenderness. Normal range of motion.      Cervical back: Normal range of motion and neck supple.   Skin:     General: Skin is warm and dry.      Findings: No erythema.   Neurological:      General: No focal deficit present.      Mental Status: She is alert and oriented to person, place, and time.   Psychiatric:         Mood and Affect: Mood normal.         Behavior: Behavior normal.         Thought Content: Thought content normal.         Judgment: Judgment normal.            "

## 2024-12-19 NOTE — ASSESSMENT & PLAN NOTE
Breast and axillary wounds continue to heal well.  Axillary incision packing was replaced today and I will see her again in 1 week for re-evaluation. We will plan for her to see Dr Rodriguez in 6 months for a surveillance visit.

## 2024-12-19 NOTE — PROGRESS NOTES
Name: Tanya Cassidy      : 1940      MRN: [id number]  Encounter Provider: MAUREEN Villa  Encounter Date: 2024   Encounter department: [facility] [place]  :  Assessment & Plan  Ductal carcinoma in situ (DCIS) of right breast  Breast and axillary wounds continue to heal well.  Axillary incision packing was replaced today and I will see her again in 1 week for re-evaluation. We will plan for her to see Dr Rodriguez in 6 months for a surveillance visit.       Encounter for postoperative wound care               History of Present Illness   Tanya Cassidy is a 84 y.o. year old female who presents today for wound care.  She is 1 months s/p right breast lumpectomy with sentinel lymph node biopsy.  She unfortunately experienced dehiscence of the axillary incision, and is being seen by [facility] for daily packing changes.  She denies any redness, swelling, fever or chills.  She reports only some scant bloody drainage from the wound.        Oncology History   Oncology History   History of left breast cancer   2012 Biopsy    Left breast stereotactic biopsy  Invasive ductal carcinoma  Grade 1  , , HER2 2+ FISH negative     2012 Surgery    Left lumpectomy with SLN biopsy (Dr. Rodriguez)  Invasive ductal carcinoma  4 mm  0/1 Lymph node     3/19/2012 -  Radiation    PBRT  Dr Rylee Matias     2012 - 2013 Hormone Therapy    Anastrozole 2012 - 2013  Letrozole 2013 - 2013 (D/C due to joint aches)  Dr Barron     Ductal carcinoma in situ (DCIS) of right breast   10/8/2024 Biopsy    Right breast ultrasound-guided biopsy  A. 9 o'clock, 3 cm from nipple (KRISTIN)  Papillary carcinoma  Grade 2  Invasion is not identified in this sample; favored to represent an encapsulated papillary carcinoma, however, complete classification should be reserved for excisional sample    B. Right axillary lymph node (KRISTIN)  One benign lymph node, negative for  "carcinoma    Concordant. Right malignancy appears unifocal; suspicious masses cover an area up to 6 mm on US. Right axilla negative after benign biopsy. Left breast clear on 2/2024 screening mammo.      10/8/2024 -  Cancer Staged    Staging form: Breast, AJCC 8th Edition  - Clinical stage from 10/8/2024: Stage 0 (cTis (DCIS), cN0(f), cM0, ER+, [place]-, HER2: Not Assessed) - Signed by Keila Rodriguez MD on 11/27/2024  Stage prefix: Initial diagnosis  Method of lymph node assessment: Core biopsy  Nuclear grade: G2       11/7/2024 Genetic Testing    Patient has genetic testing done for breast cancer.   [facility]    RESULTS NEGATIVE     11/19/2024 Surgery    RIGHT Jessica Localized Lumpectomy with [place] Lymph node Biopsy  Ducal Carcinoma In Situ, papillary and cribiform type  9 oclock, 3 cmfn  Grade 2  20 mm  Path Stage: pTis, pN0    SLN #1/clipped node: negative for carcinoma  Margins benign    ER %  ID negative       11/19/2024 -  Cancer Staged    Staging form: Breast, AJCC 8th Edition  - Pathologic stage from 11/19/2024: Stage 0 (pTis (DCIS), pN0(sn), cM0, ER+, [place]-) - Signed by Keila Rodriguez MD on 11/27/2024  Stage prefix: Initial diagnosis  Method of lymph node assessment: [place] lymph node biopsy  Nuclear grade: G2          Review of Systems A complete review of systems is negative other than that noted above in the HPI.           Objective   /76   Pulse 87   Temp 98 °F (36.7 °C)   Ht 5' 2\" (1.575 m)   Wt 102 kg (224 lb)   LMP  (LMP Unknown)   SpO2 96%   BMI 40.97 kg/m²     ECOG    Physical Exam  Vitals reviewed.   Constitutional:       General: She is not in acute distress.     Appearance: Normal appearance. She is not ill-appearing, toxic-appearing or diaphoretic.   HENT:      Head: Normocephalic.   Eyes:      General: No scleral icterus.  Cardiovascular:      Rate and Rhythm: Normal rate.   Pulmonary:      Effort: Pulmonary effort is normal.   Chest:      Comments: Right breast incision " "remains intact.  Right axillary wound is open with 1/4\" deep cavity.  There is no redness, swelling or drainage present.  1/2\" packing was removed and replaced  with 1/4\" iodoform packing.  Musculoskeletal:         General: No swelling or tenderness. Normal range of motion.      Cervical back: Normal range of motion and neck supple.   Skin:     General: Skin is warm and dry.      Findings: No erythema.   Neurological:      General: No focal deficit present.      Mental Status: She is alert and oriented to person, place, and time.   Psychiatric:         Mood and Affect: Mood normal.         Behavior: Behavior normal.         Thought Content: Thought content normal.         Judgment: Judgment normal.            "

## 2024-12-20 ENCOUNTER — HOME CARE VISIT (OUTPATIENT)
Dept: HOME HEALTH SERVICES | Facility: HOME HEALTHCARE | Age: 84
End: 2024-12-20
Payer: MEDICARE

## 2024-12-20 VITALS
OXYGEN SATURATION: 96 % | RESPIRATION RATE: 16 BRPM | TEMPERATURE: 98.6 F | DIASTOLIC BLOOD PRESSURE: 70 MMHG | HEART RATE: 82 BPM | SYSTOLIC BLOOD PRESSURE: 112 MMHG

## 2024-12-20 PROCEDURE — G0299 HHS/HOSPICE OF RN EA 15 MIN: HCPCS

## 2024-12-21 ENCOUNTER — HOME CARE VISIT (OUTPATIENT)
Dept: HOME HEALTH SERVICES | Facility: HOME HEALTHCARE | Age: 84
End: 2024-12-21
Payer: MEDICARE

## 2024-12-21 VITALS
TEMPERATURE: 97.6 F | RESPIRATION RATE: 18 BRPM | DIASTOLIC BLOOD PRESSURE: 78 MMHG | SYSTOLIC BLOOD PRESSURE: 124 MMHG | HEART RATE: 78 BPM | OXYGEN SATURATION: 98 %

## 2024-12-21 PROCEDURE — G0300 HHS/HOSPICE OF LPN EA 15 MIN: HCPCS

## 2024-12-22 ENCOUNTER — HOME CARE VISIT (OUTPATIENT)
Dept: HOME HEALTH SERVICES | Facility: HOME HEALTHCARE | Age: 84
End: 2024-12-22
Payer: MEDICARE

## 2024-12-22 VITALS
DIASTOLIC BLOOD PRESSURE: 80 MMHG | TEMPERATURE: 97.8 F | SYSTOLIC BLOOD PRESSURE: 120 MMHG | RESPIRATION RATE: 18 BRPM | HEART RATE: 78 BPM | OXYGEN SATURATION: 97 %

## 2024-12-22 PROCEDURE — G0299 HHS/HOSPICE OF RN EA 15 MIN: HCPCS

## 2024-12-23 ENCOUNTER — HOME CARE VISIT (OUTPATIENT)
Dept: HOME HEALTH SERVICES | Facility: HOME HEALTHCARE | Age: 84
End: 2024-12-23
Payer: MEDICARE

## 2024-12-23 VITALS
OXYGEN SATURATION: 98 % | RESPIRATION RATE: 16 BRPM | TEMPERATURE: 97.6 F | HEART RATE: 70 BPM | SYSTOLIC BLOOD PRESSURE: 124 MMHG | DIASTOLIC BLOOD PRESSURE: 68 MMHG

## 2024-12-23 PROCEDURE — G0299 HHS/HOSPICE OF RN EA 15 MIN: HCPCS

## 2024-12-24 ENCOUNTER — HOME CARE VISIT (OUTPATIENT)
Dept: HOME HEALTH SERVICES | Facility: HOME HEALTHCARE | Age: 84
End: 2024-12-24
Payer: MEDICARE

## 2024-12-24 VITALS
SYSTOLIC BLOOD PRESSURE: 108 MMHG | TEMPERATURE: 97.6 F | HEART RATE: 90 BPM | OXYGEN SATURATION: 96 % | RESPIRATION RATE: 16 BRPM | DIASTOLIC BLOOD PRESSURE: 62 MMHG

## 2024-12-24 PROCEDURE — G0299 HHS/HOSPICE OF RN EA 15 MIN: HCPCS

## 2024-12-25 ENCOUNTER — HOME CARE VISIT (OUTPATIENT)
Dept: HOME HEALTH SERVICES | Facility: HOME HEALTHCARE | Age: 84
End: 2024-12-25
Payer: MEDICARE

## 2024-12-25 VITALS
RESPIRATION RATE: 16 BRPM | HEART RATE: 78 BPM | OXYGEN SATURATION: 98 % | DIASTOLIC BLOOD PRESSURE: 70 MMHG | TEMPERATURE: 98.3 F | SYSTOLIC BLOOD PRESSURE: 122 MMHG

## 2024-12-25 PROCEDURE — G0299 HHS/HOSPICE OF RN EA 15 MIN: HCPCS

## 2024-12-26 ENCOUNTER — OFFICE VISIT (OUTPATIENT)
Dept: SURGICAL ONCOLOGY | Facility: CLINIC | Age: 84
End: 2024-12-26

## 2024-12-26 ENCOUNTER — PATIENT OUTREACH (OUTPATIENT)
Dept: HEMATOLOGY ONCOLOGY | Facility: CLINIC | Age: 84
End: 2024-12-26

## 2024-12-26 ENCOUNTER — HOME CARE VISIT (OUTPATIENT)
Dept: HOME HEALTH SERVICES | Facility: HOME HEALTHCARE | Age: 84
End: 2024-12-26
Payer: MEDICARE

## 2024-12-26 ENCOUNTER — TELEPHONE (OUTPATIENT)
Dept: HEMATOLOGY ONCOLOGY | Facility: CLINIC | Age: 84
End: 2024-12-26

## 2024-12-26 VITALS
SYSTOLIC BLOOD PRESSURE: 128 MMHG | HEIGHT: 62 IN | WEIGHT: 226 LBS | OXYGEN SATURATION: 95 % | HEART RATE: 81 BPM | TEMPERATURE: 97.6 F | BODY MASS INDEX: 41.59 KG/M2 | RESPIRATION RATE: 16 BRPM | DIASTOLIC BLOOD PRESSURE: 84 MMHG

## 2024-12-26 DIAGNOSIS — D05.11 DUCTAL CARCINOMA IN SITU (DCIS) OF RIGHT BREAST: Primary | ICD-10-CM

## 2024-12-26 PROCEDURE — 99024 POSTOP FOLLOW-UP VISIT: CPT

## 2024-12-26 NOTE — LETTER
2024     Keila Rodriguez MD  [address] [address]    Patient: Tanya Cassidy   YOB: 1940   Date of Visit: 2024       Dear Dr. Rodriguez:    Thank you for referring Tanya Cassidy to me for evaluation. Below are my notes for this consultation.    If you have questions, please do not hesitate to call me. I look forward to following your patient along with you.         Sincerely,        MAUREEN Villa        CC: No Recipients    MAUREEN Villa  2024 11:18 AM  Sign when Signing Visit  Name: Tanya Cassidy      : 1940      MRN: [id number]  Encounter Provider: MAUREEN Villa  Encounter Date: 2024   Encounter department: [facility] [place]  :  Assessment & Plan  Ductal carcinoma in situ (DCIS) of right breast  Right axillary wound has completely healed.  No need for any further packing.  We will plan to have mammogram performed in May, and we will see her in  for a clinical exam. She has been advised to call with any questions or concerns.  Orders:  •  Mammo diagnostic bilateral w 3d and cad; Future        History of Present Illness  Tanya Cassidy is a 84 y.o. year old female who presents today for re-evaluation of a right axillary wound.  She continues to have [facility] services, and states that yesterday they were barely able to insert any packing.  She denies any drainage, swelling, redness or pain.       Oncology History  Oncology History   History of left breast cancer   2012 Biopsy    Left breast stereotactic biopsy  Invasive ductal carcinoma  Grade 1  , , HER2 2+ FISH negative     2012 Surgery    Left lumpectomy with SLN biopsy (Dr. Rodriguez)  Invasive ductal carcinoma  4 mm  0/1 Lymph node     3/19/2012 -  Radiation    PBRT  Dr Rylee Matias     2012 - 2013 Hormone Therapy    Anastrozole 2012 - 2013  Letrozole 2013 - 2013 (D/C due to joint  aches)  Dr Barron     Ductal carcinoma in situ (DCIS) of right breast   10/8/2024 Biopsy    Right breast ultrasound-guided biopsy  A. 9 o'clock, 3 cm from nipple (JESSICA)  Papillary carcinoma  Grade 2  Invasion is not identified in this sample; favored to represent an encapsulated papillary carcinoma, however, complete classification should be reserved for excisional sample    B. Right axillary lymph node (JESSICA)  One benign lymph node, negative for carcinoma    Concordant. Right malignancy appears unifocal; suspicious masses cover an area up to 6 mm on US. Right axilla negative after benign biopsy. Left breast clear on 2/2024 screening mammo.      10/8/2024 -  Cancer Staged    Staging form: Breast, AJCC 8th Edition  - Clinical stage from 10/8/2024: Stage 0 (cTis (DCIS), cN0(f), cM0, ER+, [place]-, HER2: Not Assessed) - Signed by Keila Rodriguez MD on 11/27/2024  Stage prefix: Initial diagnosis  Method of lymph node assessment: Core biopsy  Nuclear grade: G2       11/7/2024 Genetic Testing    Patient has genetic testing done for breast cancer.   [facility]    RESULTS NEGATIVE     11/19/2024 Surgery    RIGHT Jessica Localized Lumpectomy with [place] Lymph node Biopsy  Ducal Carcinoma In Situ, papillary and cribiform type  9 oclock, 3 cmfn  Grade 2  20 mm  Path Stage: pTis, pN0    SLN #1/clipped node: negative for carcinoma  Margins benign    ER %  [place] negative       11/19/2024 -  Cancer Staged    Staging form: Breast, AJCC 8th Edition  - Pathologic stage from 11/19/2024: Stage 0 (pTis (DCIS), pN0(sn), cM0, ER+, [place]-) - Signed by Keila Rodriguez MD on 11/27/2024  Stage prefix: Initial diagnosis  Method of lymph node assessment: [place] lymph node biopsy  Nuclear grade: G2          Review of Systems A complete review of systems is negative other than that noted above in the HPI.           Objective  /84 (Patient Position: Sitting, Cuff Size: Large)   Pulse 81   Temp 97.6 °F (36.4 °C) (Temporal)   Resp 16   Ht 5'  "2\" (1.575 m)   Wt 103 kg (226 lb)   LMP  (LMP Unknown)   SpO2 95%   BMI 41.34 kg/m²     ECOG    Physical Exam  Vitals reviewed.   Constitutional:       General: She is not in acute distress.     Appearance: Normal appearance. She is not ill-appearing or toxic-appearing.   HENT:      Head: Normocephalic and atraumatic.   Eyes:      General: No scleral icterus.  Cardiovascular:      Rate and Rhythm: Normal rate.   Pulmonary:      Effort: Pulmonary effort is normal.   Chest:      Comments: Right axillary incision appears fully healed, without any redness, drainage or swelling.  Musculoskeletal:         General: Normal range of motion.      Cervical back: Normal range of motion and neck supple.   Skin:     General: Skin is warm and dry.      Coloration: Skin is not jaundiced.      Findings: No bruising or erythema.   Neurological:      General: No focal deficit present.      Mental Status: She is alert and oriented to person, place, and time.   Psychiatric:         Mood and Affect: Mood normal.         Behavior: Behavior normal.         Thought Content: Thought content normal.         Judgment: Judgment normal.        "

## 2024-12-26 NOTE — ASSESSMENT & PLAN NOTE
Right axillary wound has completely healed.  No need for any further packing.  We will plan to have mammogram performed in May, and we will see her in June for a clinical exam. She has been advised to call with any questions or concerns.  Orders:  •  Mammo diagnostic bilateral w 3d and cad; Future

## 2024-12-26 NOTE — PROGRESS NOTES
Name: Tanya Cassidy      : 1940      MRN: [id number]  Encounter Provider: MAUREEN Villa  Encounter Date: 2024   Encounter department: [facility] [place]  :  Assessment & Plan  Ductal carcinoma in situ (DCIS) of right breast  Right axillary wound has completely healed.  No need for any further packing.  We will plan to have mammogram performed in May, and we will see her in  for a clinical exam. She has been advised to call with any questions or concerns.  Orders:  •  Mammo diagnostic bilateral w 3d and cad; Future        History of Present Illness   Tanya Cassidy is a 84 y.o. year old female who presents today for re-evaluation of a right axillary wound.  She continues to have [facility] services, and states that yesterday they were barely able to insert any packing.  She denies any drainage, swelling, redness or pain.       Oncology History   Oncology History   History of left breast cancer   2012 Biopsy    Left breast stereotactic biopsy  Invasive ductal carcinoma  Grade 1  , , HER2 2+ FISH negative     2012 Surgery    Left lumpectomy with SLN biopsy (Dr. Rodriguez)  Invasive ductal carcinoma  4 mm  0/1 Lymph node     3/19/2012 -  Radiation    PBRT  Dr Huizar,  Dr Mckee     2012 - 2013 Hormone Therapy    Anastrozole 2012 - 2013  Letrozole 2013 - 2013 (D/C due to joint aches)  Dr Barron     Ductal carcinoma in situ (DCIS) of right breast   10/8/2024 Biopsy    Right breast ultrasound-guided biopsy  A. 9 o'clock, 3 cm from nipple (KRISTIN)  Papillary carcinoma  Grade 2  Invasion is not identified in this sample; favored to represent an encapsulated papillary carcinoma, however, complete classification should be reserved for excisional sample    B. Right axillary lymph node (KRISTIN)  One benign lymph node, negative for carcinoma    Concordant. Right malignancy appears unifocal; suspicious masses cover an area up to 6 mm on US. Right  "axilla negative after benign biopsy. Left breast clear on 2/2024 screening mammo.      10/8/2024 -  Cancer Staged    Staging form: Breast, AJCC 8th Edition  - Clinical stage from 10/8/2024: Stage 0 (cTis (DCIS), cN0(f), cM0, ER+, [place]-, HER2: Not Assessed) - Signed by Keila Rodriguez MD on 11/27/2024  Stage prefix: Initial diagnosis  Method of lymph node assessment: Core biopsy  Nuclear grade: G2       11/7/2024 Genetic Testing    Patient has genetic testing done for breast cancer.   [facility]    RESULTS NEGATIVE     11/19/2024 Surgery    RIGHT Jessica Localized Lumpectomy with [place] Lymph node Biopsy  Ducal Carcinoma In Situ, papillary and cribiform type  9 oclock, 3 cmfn  Grade 2  20 mm  Path Stage: pTis, pN0    SLN #1/clipped node: negative for carcinoma  Margins benign    ER %  [place] negative       11/19/2024 -  Cancer Staged    Staging form: Breast, AJCC 8th Edition  - Pathologic stage from 11/19/2024: Stage 0 (pTis (DCIS), pN0(sn), cM0, ER+, [place]-) - Signed by Keila Rodriguez MD on 11/27/2024  Stage prefix: Initial diagnosis  Method of lymph node assessment: [place] lymph node biopsy  Nuclear grade: G2          Review of Systems A complete review of systems is negative other than that noted above in the HPI.           Objective   /84 (Patient Position: Sitting, Cuff Size: Large)   Pulse 81   Temp 97.6 °F (36.4 °C) (Temporal)   Resp 16   Ht 5' 2\" (1.575 m)   Wt 103 kg (226 lb)   LMP  (LMP Unknown)   SpO2 95%   BMI 41.34 kg/m²     ECOG    Physical Exam  Vitals reviewed.   Constitutional:       General: She is not in acute distress.     Appearance: Normal appearance. She is not ill-appearing or toxic-appearing.   HENT:      Head: Normocephalic and atraumatic.   Eyes:      General: No scleral icterus.  Cardiovascular:      Rate and Rhythm: Normal rate.   Pulmonary:      Effort: Pulmonary effort is normal.   Chest:      Comments: Right axillary incision appears fully healed, without any redness, " drainage or swelling.  Musculoskeletal:         General: Normal range of motion.      Cervical back: Normal range of motion and neck supple.   Skin:     General: Skin is warm and dry.      Coloration: Skin is not jaundiced.      Findings: No bruising or erythema.   Neurological:      General: No focal deficit present.      Mental Status: She is alert and oriented to person, place, and time.   Psychiatric:         Mood and Affect: Mood normal.         Behavior: Behavior normal.         Thought Content: Thought content normal.         Judgment: Judgment normal.

## 2025-01-23 ENCOUNTER — PATIENT OUTREACH (OUTPATIENT)
Dept: HEMATOLOGY ONCOLOGY | Facility: CLINIC | Age: 85
End: 2025-01-23

## 2025-01-23 NOTE — PROGRESS NOTES
Called and spoke with patient for a follow up call. Mentioned to patient I wanted to address any questions or concerns she might have at this time. Tanya stated that she has no questions or concerns at this time and that all is well. Asked patient about her eating and drinking she jokingly mentioned she eats to much.  Patient has reliable transportation currently. Patient is not experiencing any pain at this time. Mentioned to patient that should she have any questions or concerns to please reach out to me. Additionally made patient aware that I will be in touch with her in a few weeks. Patient was thankful for my call.

## 2025-03-10 ENCOUNTER — PATIENT OUTREACH (OUTPATIENT)
Dept: HEMATOLOGY ONCOLOGY | Facility: CLINIC | Age: 85
End: 2025-03-10

## 2025-03-10 NOTE — PROGRESS NOTES
Called and LVM for patient mentioning that I am her patient navigator.  Mentioned that I wanted to address any questions,concerns or any new barriers to care. Mentioned that if patient has questions or concerns to please give me a call as I was calling from my direct line.

## 2025-03-24 ENCOUNTER — ANNUAL EXAM (OUTPATIENT)
Dept: OBGYN CLINIC | Facility: MEDICAL CENTER | Age: 85
End: 2025-03-24
Payer: MEDICARE

## 2025-03-24 VITALS
HEIGHT: 62 IN | SYSTOLIC BLOOD PRESSURE: 128 MMHG | BODY MASS INDEX: 41.22 KG/M2 | DIASTOLIC BLOOD PRESSURE: 74 MMHG | WEIGHT: 224 LBS

## 2025-03-24 DIAGNOSIS — Z01.419 ENCOUNTER FOR GYNECOLOGICAL EXAMINATION (GENERAL) (ROUTINE) WITHOUT ABNORMAL FINDINGS: Primary | ICD-10-CM

## 2025-03-24 DIAGNOSIS — Z13.820 ENCOUNTER FOR OSTEOPOROSIS SCREENING IN ASYMPTOMATIC POSTMENOPAUSAL PATIENT: ICD-10-CM

## 2025-03-24 DIAGNOSIS — Z78.0 ENCOUNTER FOR OSTEOPOROSIS SCREENING IN ASYMPTOMATIC POSTMENOPAUSAL PATIENT: ICD-10-CM

## 2025-03-24 DIAGNOSIS — N39.46 MIXED INCONTINENCE: ICD-10-CM

## 2025-03-24 PROCEDURE — G0101 CA SCREEN;PELVIC/BREAST EXAM: HCPCS | Performed by: CLINICAL NURSE SPECIALIST

## 2025-03-24 NOTE — PROGRESS NOTES
Name: Tanya Cassidy      : 1940      MRN: [id number]  Encounter Provider: MAUREEN Childress  Encounter Date: 3/24/2025   Encounter department: [facility] [place]    :  Assessment & Plan  Encounter for gynecological examination (general) (routine) without abnormal findings  Annual GYN examination completed today.   Health maintenance reviewed/updated as appropriate  Risk prevention and anticipatory guidance provided including:  Encouraged regular self breast exams and to call with changes   Age related Calcium and vitamin D intake  Dietary and lifestyle recommendations based on her age and weight. body mass index is 40.97 kg/m²..    Tobacco and alcohol use, intervention ordered if applicable.        Encounter for osteoporosis screening in asymptomatic postmenopausal patient  No prior bone density testing.  Reviewed rec for testing q 2yrs. And placed order  Orders:  •  DXA bone density spine hip and pelvis; Future    Mixed incontinence  Pt with incontinence- using pad daily  Exam ant wall prolapse- mild- likely stage 2.  Reviewed tx- including pessary, Minor surgical procedure and possible injectable - for the latter 2 would need urogyn consult   Declines pessary for now.                 Subjective:      History of Present Illness     Tanya Cassidy is a 84 y.o. female. Here for Gynecologic Exam (Postmenopausal/Pap 2017 no longer indicated /Mammo 23 - scheduled hx of breast cancer /Colonoscopy )      Pt is postmenopausal. S/p Hysterectomy - fibroids  Has ovaries.  Is having some tenderness in the lower pelvic area.  Waxes and wanes, but is there  Worse with heavy lifting  She denies any C/O abnormal vaginal discharge or irritation. Is having some stress incontinence  Also noting some itching but  more externally and is intermittent.   Hx of breast cancer in both breasts    No longer sexually active  Very independent- lives with spouse and daughter  She  "reports she feels safe at home.   Lifestyle/exercise: minimal  Dietary calcium/vit D  intake:     HEALTH MAINTENANCE SCREENINGS:     Previous pap smears and ASCCP screening guidelines have been reviewed.   Last Pap:  02/15/2017; aged out.   History of abnormal pap: No,   Last mammogram:  02/12/2024  Last Colon Cancer Screening: colonoscopy: Not on file Cologuard:Not on file. Has had but doesn't recall year. Aged out   Last Dexa Scan: none on file- reviewed I will place order.    Substance Abuse Screening Completed. See hx and flowsheet.    Review of Systems   Constitutional:  Negative for appetite change, chills, fatigue, fever and unexpected weight change.   HENT: Negative.     Eyes: Negative.    Respiratory:  Negative for chest tightness and shortness of breath.    Cardiovascular:  Negative for chest pain and palpitations.   Gastrointestinal:  Negative for abdominal pain, constipation and vomiting.   Endocrine: Negative for cold intolerance and heat intolerance.   Genitourinary:         As per HPI   Musculoskeletal:  Negative for back pain, joint swelling and neck pain.   Skin:  Negative for color change and rash.   Neurological:  Negative for dizziness, weakness and numbness.   Hematological:  Does not bruise/bleed easily.   Psychiatric/Behavioral: Negative.         The following portions of the patient's history were reviewed and updated as appropriate: allergies, current medications, past family history, past medical history, past social history, past surgical history, and problem list.         Objective   /74 (BP Location: Left arm, Patient Position: Sitting, Cuff Size: Large)   Ht 5' 2\" (1.575 m)   Wt 102 kg (224 lb)   LMP  (LMP Unknown)   BMI 40.97 kg/m²     Physical Exam  Constitutional:       General: She is not in acute distress.     Appearance: Normal appearance.   Genitourinary:      Vulva and rectum normal.      No lesions in the vagina.      Genitourinary Comments: Cervix/Uterus surgically " absent      Right Labia: No tenderness or skin changes.     Left Labia: No tenderness or skin changes.     No vaginal discharge, erythema, tenderness or bleeding.      Anterior vaginal prolapse present.     Moderate vaginal atrophy present.       Right Adnexa: not tender and no mass present.     Left Adnexa: not tender and no mass present.     Cervix is absent.      Uterus is absent.      No urethral prolapse present.      Pelvic exam was performed with patient in the lithotomy position.   Breasts:     Breasts are symmetrical.      Right: No inverted nipple, mass, nipple discharge, skin change or tenderness.      Left: No inverted nipple, mass, nipple discharge, skin change or tenderness.   HENT:      Head: Normocephalic and atraumatic.   Cardiovascular:      Rate and Rhythm: Normal rate.      Heart sounds: No murmur heard.  Pulmonary:      Effort: Pulmonary effort is normal.      Breath sounds: Normal breath sounds.   Abdominal:      General: There is no distension.      Palpations: Abdomen is soft.      Tenderness: There is no abdominal tenderness.   Musculoskeletal:         General: Normal range of motion.      Cervical back: Normal range of motion.   Lymphadenopathy:      Cervical: No cervical adenopathy.   Neurological:      Mental Status: She is alert and oriented to person, place, and time.   Skin:     General: Skin is warm and dry.   Psychiatric:         Mood and Affect: Mood normal.         Behavior: Behavior normal.   Vitals reviewed.

## 2025-04-07 ENCOUNTER — PATIENT OUTREACH (OUTPATIENT)
Dept: HEMATOLOGY ONCOLOGY | Facility: CLINIC | Age: 85
End: 2025-04-07

## 2025-04-07 NOTE — PROGRESS NOTES
Called and LVM for follow up with patient, mentioned that I am her patient navigator and I was calling to check in with patient. Mentioned that I wanted to address any questions,concerns or any new barriers to care patient might have at this time. Stated that if she did have any questions or concerns she can call me back directly as I was calling from my direct line.

## 2025-05-07 ENCOUNTER — HOSPITAL ENCOUNTER (OUTPATIENT)
Dept: MAMMOGRAPHY | Facility: CLINIC | Age: 85
Discharge: HOME | End: 2025-05-07
Payer: MEDICARE

## 2025-05-07 DIAGNOSIS — D05.11 DUCTAL CARCINOMA IN SITU (DCIS) OF RIGHT BREAST: ICD-10-CM

## 2025-05-07 PROCEDURE — G0279 TOMOSYNTHESIS, MAMMO: HCPCS

## 2025-05-07 PROCEDURE — 77066 DX MAMMO INCL CAD BI: CPT

## 2025-05-13 ENCOUNTER — PATIENT OUTREACH (OUTPATIENT)
Dept: HEMATOLOGY ONCOLOGY | Facility: CLINIC | Age: 85
End: 2025-05-13

## 2025-05-13 NOTE — PROGRESS NOTES
Called and LVM for follow up with patient. Mentioned that I am her patient navigator and I was calling to check in with patient. Stated that I wanted to address any questions,concerns or any new barriers to care patient might have at this time. Mentioned that if patient or daughter had any questions or concerns that I can be reached directly as I was calling from my direct line #[phone].

## 2025-06-12 ENCOUNTER — OFFICE VISIT (OUTPATIENT)
Dept: SURGICAL ONCOLOGY | Facility: CLINIC | Age: 85
End: 2025-06-12
Payer: MEDICARE

## 2025-06-12 VITALS
WEIGHT: 224 LBS | OXYGEN SATURATION: 97 % | HEIGHT: 62 IN | DIASTOLIC BLOOD PRESSURE: 68 MMHG | SYSTOLIC BLOOD PRESSURE: 116 MMHG | BODY MASS INDEX: 41.22 KG/M2 | HEART RATE: 97 BPM

## 2025-06-12 DIAGNOSIS — Z13.71 BRCA GENE MUTATION NEGATIVE IN FEMALE: ICD-10-CM

## 2025-06-12 DIAGNOSIS — Z85.3 HISTORY OF LEFT BREAST CANCER: ICD-10-CM

## 2025-06-12 DIAGNOSIS — Z85.3 ENCOUNTER FOR FOLLOW-UP SURVEILLANCE OF BREAST CANCER: Primary | ICD-10-CM

## 2025-06-12 DIAGNOSIS — Z86.000 HISTORY OF DUCTAL CARCINOMA IN SITU (DCIS) OF RIGHT BREAST: ICD-10-CM

## 2025-06-12 DIAGNOSIS — D05.11 DUCTAL CARCINOMA IN SITU (DCIS) OF RIGHT BREAST: ICD-10-CM

## 2025-06-12 DIAGNOSIS — Z08 ENCOUNTER FOR FOLLOW-UP SURVEILLANCE OF BREAST CANCER: Primary | ICD-10-CM

## 2025-06-12 PROCEDURE — 99213 OFFICE O/P EST LOW 20 MIN: CPT | Performed by: SURGERY

## 2025-06-12 NOTE — PROGRESS NOTES
Name: Tanya Cassidy      : 1940      MRN: [id number]  Encounter Provider: Keila Rodriguez MD  Encounter Date: 2025   Encounter department: [facility] [place]  :  Assessment & Plan  Ductal carcinoma in situ (DCIS) of right breast    Orders:    Mammo diagnostic bilateral w 3d and cad; Future    History of ductal carcinoma in situ (DCIS) of right breast         History of left breast cancer         BRCA gene mutation negative in female         Encounter for follow-up surveillance of breast cancer         85-year-old female who has a history of left breast invasive ductal carcinoma status post left breast conservation including radiation therapy and anastrozole.  She more recently developed a contralateral/right sided ductal carcinoma in situ status postlumpectomy.  She opted for observation.  There is no evidence of disease based on exam today.  The area of patient reported discomfort corresponds to the muscle bed.  I made supportive recommendations to her.  She reports having had a prior rotator cuff injury and declined repair.  We will see her again in 6 months.  I will make arrangements for her diagnostic mammogram for next May.  I will see her following this.  If no concerns at that time, she can start annual exams.      History of Present Illness   Tanya Cassidy is a 85 y.o. year old female who presents for a breast cancer follow-up.  She reports discomfort in the upper left chest wall but no additional concerns.  She had a recent mammogram.     Oncology History   Cancer Staging   History of ductal carcinoma in situ (DCIS) of right breast  Staging form: Breast, AJCC 8th Edition  - Clinical stage from 10/8/2024: Stage 0 (cTis (DCIS), cN0(f), cM0, ER+, IN-, HER2: Not Assessed) - Signed by Keila Rodriguez MD on 2024  Stage prefix: Initial diagnosis  Method of lymph node assessment: Core biopsy  Nuclear grade: G2  - Pathologic stage from 2024: Stage 0 (pTis  (DCIS), pN0(sn), cM0, ER+, [place]-) - Signed by Keila Rodriguez MD on 11/27/2024  Stage prefix: Initial diagnosis  Method of lymph node assessment: [place] lymph node biopsy  Nuclear grade: G2  Oncology History   History of left breast cancer   1/16/2012 Biopsy    Left breast stereotactic biopsy  Invasive ductal carcinoma  Grade 1  , , HER2 2+ FISH negative     2/17/2012 Surgery    Left lumpectomy with SLN biopsy (Dr. Rodriguez)  Invasive ductal carcinoma  4 mm  0/1 Lymph node     3/19/2012 -  Radiation    PBRT  Dr Huizar,  Dr Mckee     4/2012 - 12/2013 Hormone Therapy    Anastrozole 4/2012 - 8/2013  Letrozole 8/2013 - 12/2013 (D/C due to joint aches)  Dr Barron     History of ductal carcinoma in situ (DCIS) of right breast   10/8/2024 Biopsy    Right breast ultrasound-guided biopsy  A. 9 o'clock, 3 cm from nipple (JESSICA)  Papillary carcinoma  Grade 2  Invasion is not identified in this sample; favored to represent an encapsulated papillary carcinoma, however, complete classification should be reserved for excisional sample    B. Right axillary lymph node (JESSICA)  One benign lymph node, negative for carcinoma    Concordant. Right malignancy appears unifocal; suspicious masses cover an area up to 6 mm on US. Right axilla negative after benign biopsy. Left breast clear on 2/2024 screening mammo.      10/8/2024 -  Cancer Staged    Staging form: Breast, AJCC 8th Edition  - Clinical stage from 10/8/2024: Stage 0 (cTis (DCIS), cN0(f), cM0, ER+, [place]-, HER2: Not Assessed) - Signed by Keila Rodriguez MD on 11/27/2024  Stage prefix: Initial diagnosis  Method of lymph node assessment: Core biopsy  Nuclear grade: G2       11/7/2024 Genetic Testing    Patient has genetic testing done for breast cancer.   [facility]    RESULTS NEGATIVE     11/19/2024 Surgery    RIGHT Jessica Localized Lumpectomy with [place] Lymph node Biopsy  Ducal Carcinoma In Situ, papillary and cribiform type  9 oclock, 3 cmfn  Grade 2  20 mm  Path Stage:  pTis, pN0    SLN #1/clipped node: negative for carcinoma  Margins benign    ER %  MI negative       11/19/2024 -  Cancer Staged    Staging form: Breast, AJCC 8th Edition  - Pathologic stage from 11/19/2024: Stage 0 (pTis (DCIS), pN0(sn), cM0, ER+, [place]-) - Signed by Keila Rodriguez MD on 11/27/2024  Stage prefix: Initial diagnosis  Method of lymph node assessment: [place] lymph node biopsy  Nuclear grade: G2          Review of Systems   Constitutional: Negative.  Negative for appetite change, fever and unexpected weight change.   HENT: Negative.  Negative for trouble swallowing.    Eyes: Negative.    Respiratory: Negative.  Negative for cough and shortness of breath.    Cardiovascular: Negative.  Negative for chest pain.   Gastrointestinal: Negative.  Negative for abdominal pain, nausea and vomiting.   Endocrine: Negative.    Genitourinary: Negative.  Negative for dysuria.   Musculoskeletal: Negative.  Negative for arthralgias and myalgias.   Skin: Negative.    Allergic/Immunologic: Negative.    Neurological: Negative.  Negative for headaches.   Hematological: Negative.  Negative for adenopathy. Does not bruise/bleed easily.   Psychiatric/Behavioral: Negative.      A complete review of systems is negative other than that noted above in the HPI.    Past Medical History   Past Medical History[1]  Past Surgical History[2]  Family History[3]   reports that she has never smoked. She has never used smokeless tobacco. She reports current alcohol use. She reports that she does not use drugs.  Current Outpatient Medications   Medication Instructions    acetaminophen (TYLENOL 8 HOUR) 650 mg, Oral, Every 8 hours    amLODIPine (NORVASC) 10 mg, Daily    calcium citrate-vitamin D (CITRACAL+D) 315-200 MG-UNIT per tablet 1 tablet, Daily    doxycycline hyclate (VIBRAMYCIN) 100 mg, 2 times daily    FIBER ADULT GUMMIES PO 3 gums, Daily    furosemide (LASIX) 40 mg, Daily    Lancets (OneTouch Delica Plus [id number]) MISC     lidocaine  "(LIDODERM) 5 % 1 patch, Topical, Daily, Remove & Discard patch within 12 hours or as directed by MD    losartan (COZAAR) 100 mg, Daily    metFORMIN (GLUCOPHAGE) 500 mg, 2 times daily with meals    Multiple Vitamin (MULTIVITAMIN) tablet 1 tablet, Daily    mupirocin (BACTROBAN) 2 % ointment APPLY A SMALL AMOUNT TO THE AFFECTED AREA THREE TIMES DAILY    OneTouch Verio test strip     pantoprazole (PROTONIX) 40 mg, Oral, Daily (early morning)    Synthroid 88 mcg, Daily    traMADol (ULTRAM) 50 mg, Oral, Every 6 hours PRN    Vitamin D3 4,000 Units, Daily   Allergies[4]        Objective   /68 (BP Location: Left arm, Patient Position: Sitting)   Pulse 97   Ht 5' 2\" (1.575 m)   Wt 102 kg (224 lb)   LMP  (LMP Unknown)   SpO2 97%   BMI 40.97 kg/m²     Pain Screening:     ECOG    Physical Exam  Constitutional:       General: She is not in acute distress.     Appearance: Normal appearance. She is well-developed.   HENT:      Head: Normocephalic and atraumatic.   Chest:   Breasts:     Right: Skin change (lumpectomy scar) present. No swelling, bleeding, inverted nipple, mass, nipple discharge or tenderness.      Left: Skin change (lumpectomy scar) and tenderness (in the muscle bed) present. No swelling, bleeding, inverted nipple, mass or nipple discharge.     Musculoskeletal:      Right lower leg: No edema.      Left lower leg: No edema.   Lymphadenopathy:      Upper Body:      Right upper body: No supraclavicular, axillary or pectoral adenopathy.      Left upper body: No supraclavicular, axillary or pectoral adenopathy.     Neurological:      Mental Status: She is alert and oriented to person, place, and time.     Psychiatric:         Mood and Affect: Mood normal.          Labs: I have reviewed pertinent labs.     No visits with results within 1 Month(s) from this visit.   Latest known visit with results is:   Admission on 11/19/2024, Discharged on 11/19/2024   Component Date Value Ref Range Status    POC Glucose " 11/19/2024 142 (H)  65 - 140 mg/dl Final    Case Report 11/19/2024    Final                    Value:Surgical Pathology Report                         Case: [id number]                                  Authorizing Provider:  Keila Rodriguez MD           Collected:           11/19/2024 1444              Ordering Location:     [facility]        Received:            11/19/2024 1641                                     [place] [facility]                                                     Pathologist:           Teresa Urias MD                                                    Specimens:   A) - Breast, Right, Right breast Lumpectomy, short superior Long Lateral                            B) - Breast, Right, Right Breast  New Medial Margin, Suture marks true margin.                      C) - Breast, Right, Right Breast New Inferior margin, suture marks true margin.                     D) - Lymph Node, [place] Node #1/ Clipped Node.                                                    E) - Breast, Right, Right Breast New Posterior Margin, Sutre marks true margin             Final Diagnosis 11/19/2024    Final                    Value:A. Breast, Right, Right breast Lumpectomy, short superior Long Lateral:  - Ductal carcinoma in situ, papillary and cribriform type, intermediate nuclear grade.  - DCIS is noted in 4 of 11 blocks.  - DCIS is 7 mm from closest superior margin. Please see specimens B,C,E for additional margins.  - No invasive carcinoma identified  - Prior biopsy site changes.    - Skin, unremarkable.    B. Breast, Right, Right Breast  New Medial Margin, Suture marks true margin.:  - Benign breast tissue with usual type ductal hyperplasia.     C. Breast, Right, Right Breast New Inferior margin, suture marks true margin.:  - Minute focus of atypical lobular hyperplasia (C3).  - Negative for in situ or invasive ductal carcinoma.     D. Lymph Node, [place] Node #1/ Clipped Node.:  - One lymph  node, negative for carcinoma.  - Prior biopsy site changes.     E. Breast, Right, Right Breast New Posterior Margin, Sutre marks true margin:  - Benign breast tissue.       Note 11/19/2024    Final                    Value:Best block with tumor A7      Additional Information 11/19/2024    Final                    Value:All reported additional testing was performed with appropriately reactive controls.  These tests were developed and their performance characteristics determined by [facility] or appropriate performing facility, though some tests may be performed on tissues which have not been validated for performance characteristics (such as staining performed on alcohol exposed cell blocks and decalcified tissues).  Results should be interpreted with caution and in the context of the patients’ clinical condition. These tests may not be cleared or approved by the U.S. Food and Drug Administration, though the FDA has determined that such clearance or approval is not necessary. These tests are used for clinical purposes and they should not be regarded as investigational or for research. This laboratory has been approved by CLIA 88, designated as a high-complexity laboratory and is qualified to perform these tests.    Interpretation performed at [facility], [address] [place] [id number]      Synoptic Checklist 11/19/2024    Final                    Value:DCIS OF THE BREAST: Resection                            DCIS OF THE BREAST: RESECTION - All Specimens                            8th Edition - Protocol posted: 3/23/2022                                                        SPECIMEN                               Procedure:    Excision (less than total mastectomy)                                Specimen Laterality:    Right                                                         TUMOR                               Tumor Site:    Clock position                                 :    9 o'clock                              Tumor Site:    Distance from nipple (Centimeters): 3 cm                             Histologic Type:    Ductal carcinoma in situ                              Size (Extent) of DCIS:    Estimated size (extent) of DCIS is at least (Millimeters): 20 mm                               Number of Blocks with DCIS:    4                                Number of Blocks Examined:    23                              Architectural Patterns:    Cribriform                              Architectural Patterns:    Papillary                              Architectural Patterns:    Solid                              Nuclear Grade:    Grade II (intermediate)                              Necrosis:    Not identified                              Microcalcifications:    Present in nonneoplastic tissue                                                         MARGINS                             Margin Status:    All margins negative for DCIS                                Distance from DCIS to Closest Margin:    7 mm                               Closest Margin(s) to DCIS:    Superior                                                         REGIONAL LYMPH NODES                             Regional Lymph Node Status:                                   :    All regional lymph nodes negative for tumor                                Total Number of Lymph Nodes Examined (sentinel and non-sentinel):    1                                Number of [place] Nodes Examined:    1                                                         PATHOLOGIC STAGE CLASSIFICATION (pTNM, AJCC 8th Edition)                               Reporting of pT, pN, and (when applicable) pM categories is based on information available to the pathologist at the time the report is issued. As per the AJCC (Chapter 1, 8th Ed.) it is the managing physician’s responsibility to establish the final pathologic stage based upon all pertinent  "information, including but potentially not limited to this pathology report.                             pT Category:    pTis (DCIS)                              Regional Lymph Nodes Modifier:    (sn): [place] node(s) evaluated                              pN Category:    pN0                                                          Breast Biomarker Testing Performed on Previous Biopsy:                                   Estrogen Receptor (ER) Status:    Positive                                  Percentage of Cells with Nuclear Positivity:    %                              Breast Biomarker Testing Performed on Previous Biopsy:                                   Progesterone Receptor (PgR) Status:    Negative                                Testing Performed on Case Number:    [id number]       Gross Description 11/19/2024    Final                    Value:A. The specimen is received in formalin, labeled with the patient's name and hospital number, and is designated \"right breast lumpectomy, short superior long lateral\".  The specimen consists of a 20.9 g right breast lumpectomy specimen containing 2 orienting sutures placed by the surgeon as follows: Short-superior, long-lateral.  It measures 4.1 cm medial-lateral, 2.5 cm superior-inferior and 5.6 cm anterior-posterior.  There is an elliptical excision of tan-pink skin attached anteriorly measuring 2.1 x 1.0 cm.  The specimen is inked as follows: Anterior/skin-green, posterior-black, superior-orange, inferior-blue, medial-yellow and lateral-red.  The specimen is serially sectioned in an anterior to posterior progression revealing a 0.8 (medial-lateral) x 0.8 (superior-inferior) x 1.5 cm (anterior-posterior) tan-pink nodular mass containing a Jessica  reflector.  The mass is located 0.1 cm away from the inferior margin, 0.5 cm away from the posterior margin, 1.0 cm away from the                           superior margin, 1.1 cm away from the medial margin, 1.5 cm " "away from the lateral margin and is greater than 2.0 cm away from the anterior margin/skin.  No other lesions are identified.  The uninvolved breast parenchyma is yellow, lobulated and predominantly comprised fat.  A breast map is attached to the requisition.  Representative sections are submitted as follows:    1: Skin, perpendicular, representative  2: Section anterior to mass  3-7: Mass entirely submitted sequentially from anterior to posterior, cassette 6 contains section with Jessica , includes nearest medial, inferior and superior margins  8-9: Posterior end of resection, perpendicular, entirely submitted/section posterior to mass  10-11: Closest lateral margin  B. The specimen is received in formalin, labeled with the patient's name and hospital number, and is designated \" right breast new medial margin, suture marks true margin\".  The specimen consists of a single portion of yellow-tan soft tissue measuring 3.0 x 2.2                           x 1.0 cm.  A suture designates the new medial margin which is inked yellow and the remainder of the specimen is inked black.  The specimen is serially sectioned perpendicular to the margin revealing yellow fibrofatty cut surfaces.  The specimen is entirely submitted sequentially in 4 cassettes.  C. The specimen is received in formalin, labeled with the patient's name and hospital number, and is designated \" right breast new inferior margin, suture marks true margin\".  The specimen consists of a single portion of yellow-tan soft tissue measuring 2.6 x 2.0 x 1.2 cm.  There is a suture designating the new inferior margin which is inked blue and the remainder the specimen is inked black.  The specimen is serially sectioned perpendicular to the margin revealing yellow fibrofatty cut surfaces.  The specimen is entirely submitted sequentially in 4 cassettes.  D. The specimen is received in formalin, labeled with the patient's name and hospital number, and is designated \" " "sentinel node                           #1/clipped node\".  The specimen consists of a single portion of yellow-tan soft tissue measuring 4.5 x 3.0 x 2.1 cm.  The specimen is serially sectioned along the short axis revealing a 3.6 x 2.5 x 1.1 cm tan-pink lymph node.  Within the lymph node, a Jessica  reflector is found.  The lymph node is entirely submitted sequentially in 7 cassettes.  Cassette 4 contains section with Jessica .  E. The specimen is received in formalin, labeled with the patient's name and hospital number, and is designated \" right breast new posterior margin, suture marks true margin\".  The specimen consists of a single portion of yellow-tan soft tissue measuring 2.5 x 2.0 x 1.0 cm.  There is a suture designating the new posterior margin which is inked black and the remainder the specimen is inked blue.  The specimen is serially sectioned perpendicular to the margin revealing yellow fibrofatty cut surfaces.  The specimen is entirely submitted sequentially in 4 cassettes.    Note: The estimated total                           formalin fixation time based upon information provided by the submitting clinician and the standard processing schedule is 29.0 hours.  The cold ischemia time based upon information provided by the submitting clinician and receiving staff in the laboratory is within 1 minutes.  RRavotti      POC Glucose 11/19/2024 165 (H)  65 - 140 mg/dl Final     Pathology: I have reviewed pathology reports described above.    Radiology Results Review: I personally reviewed the following image studies in PACS and associated radiology reports: 5/7/2025 bilateral 3D diagnostic mammogram. My interpretation of the radiology images/reports is: Surgical changes bilateral but no evidence of malignancy.         [1]   Past Medical History:  Diagnosis Date    Ambulates with cane     Arthritis     Chronic pain disorder     legs-hands-back-all joints    Diabetes mellitus (HCC)     type 2    Disease of " thyroid gland     GERD (gastroesophageal reflux disease)     Headache     Hemorrhoids     History of left breast cancer     History of pneumonia as a child     Hx of radiation therapy 03/09/2012    PBRT    Hyperlipidemia     Hypertension     Muscle weakness     at time-feels related to arthritis    Night sweats     Rheumatoid arthritis (HCC)     Risk for falls     Rotator cuff injury     Sleep apnea     Trigger finger     right and left hand    Urinary incontinence     uses depends    Use of anastrozole (Arimidex)     Use of letrozole (Femara)     Wears glasses    [2]   Past Surgical History:  Procedure Laterality Date    ABDOMINAL SURGERY      APPENDECTOMY      BREAST BIOPSY Left 01/162012    IDC    BREAST BIOPSY Right 10/08/2024    BREAST LUMPECTOMY Left 02/17/2012    BREAST SURGERY      CATARACT EXTRACTION      CHOLECYSTECTOMY      COLONOSCOPY      FOOT SURGERY      right and left foot-screws implanted    HYSTERECTOMY      JOINT REPLACEMENT Bilateral     knee replacements    AZ BX/EXC LYMPH NODE OPEN SUPERFICIAL Right 11/19/2024    Procedure: RIGHT KRISTIN LOCALIZED LUMPECTOMY W/  SENTINEL LYMPH NODE BX, LYMPHOSCINTIGRAPHY, & LYMPHATIC MAPPING;;  Surgeon: Keila Rodriguez MD;  Location: [facility];  Service: Surgical Oncology    AZ EXC BREAST LES PREOP PLMT RAD MARKER OPEN 1 LES Right 11/19/2024    Procedure: RIGHT KRISTIN LOCALIZED LUMPECTOMY W/  SENTINEL LYMPH NODE BX, LYMPHOSCINTIGRAPHY, & LYMPHATIC MAPPING;;  Surgeon: Keila Rodriguez MD;  Location: [facility];  Service: Surgical Oncology    AZ INTRAOP SENTINEL LYMPH NODE ID W/DYE INJECTION Right 11/19/2024    Procedure: RIGHT KRISTIN LOCALIZED LUMPECTOMY W/  SENTINEL LYMPH NODE BX, LYMPHOSCINTIGRAPHY, & LYMPHATIC MAPPING;;  Surgeon: Keila Rodriguez MD;  [facility];  Service: Surgical Oncology    AZ MASTECTOMY PARTIAL Right 11/19/2024    Procedure: RIGHT KRISTIN LOCALIZED LUMPECTOMY W/  SENTINEL LYMPH NODE BX, LYMPHOSCINTIGRAPHY, & LYMPHATIC MAPPING;;  Surgeon: Keila  MD Jennifer;  Location: [facility];  Service: Surgical Oncology    OH TENDON SHEATH INCISION Right 08/07/2018    Procedure: RELEASE TRIGGER FINGER - LONG FINGER AND RING FINGER;  Surgeon: Domingo Meyers MD;  Location: [facility];  Service: Orthopedics    OH TENDON SHEATH INCISION Left 08/14/2018    Procedure: RELEASE TRIGGER FINGER - LEFT INDEX FINGER;  Surgeon: Domingo Meyers MD;  Location: [facility];  Service: Orthopedics    REPAIR RECTOCELE      TONSILLECTOMY      TUBAL LIGATION      US GUIDED BREAST BIOPSY RIGHT COMPLETE Right 10/08/2024    US GUIDED BREAST LYMPH NODE BIOPSY RIGHT Right 10/08/2024    US GUIDED THYROID BIOPSY  03/30/2017    WOUND CLOSURE  12/01/2024   [3]   Family History  Problem Relation Name Age of Onset    Prostate cancer Father          age at dx unk    Prostate cancer Brother  73    Thyroid cancer Brother          age at dx unk    Skin cancer Brother      Thyroid cancer Daughter stephany 45        age at dx unk    Breast cancer Maternal Aunt sharon 69        age at dx unk    Lung cancer Maternal Uncle          age at dx unk    No Known Problems Son jonn     No Known Problems Son jermaine     No Known Problems Son nithya     Pancreatic cancer Maternal Aunt william         unknown age   [4]   Allergies  Allergen Reactions    Sulfa Antibiotics Rash    Penicillins Rash

## 2025-06-13 ENCOUNTER — TELEPHONE (OUTPATIENT)
Dept: HEMATOLOGY ONCOLOGY | Facility: CLINIC | Age: 85
End: 2025-06-13

## 2025-07-17 ENCOUNTER — OFFICE VISIT (OUTPATIENT)
Dept: DERMATOLOGY | Facility: CLINIC | Age: 85
End: 2025-07-17
Payer: MEDICARE

## 2025-07-17 VITALS — BODY MASS INDEX: 41.22 KG/M2 | TEMPERATURE: 98 F | HEIGHT: 62 IN | WEIGHT: 224 LBS

## 2025-07-17 DIAGNOSIS — L81.4 LENTIGO: ICD-10-CM

## 2025-07-17 DIAGNOSIS — D18.01 CHERRY ANGIOMA: ICD-10-CM

## 2025-07-17 DIAGNOSIS — Z85.89 HISTORY OF SQUAMOUS CELL CARCINOMA: Primary | ICD-10-CM

## 2025-07-17 DIAGNOSIS — L85.3 XEROSIS OF SKIN: ICD-10-CM

## 2025-07-17 DIAGNOSIS — D22.9 MULTIPLE MELANOCYTIC NEVI: ICD-10-CM

## 2025-07-17 DIAGNOSIS — L82.1 SEBORRHEIC KERATOSIS: ICD-10-CM

## 2025-07-17 PROCEDURE — 99213 OFFICE O/P EST LOW 20 MIN: CPT | Performed by: DERMATOLOGY

## 2025-07-17 NOTE — PROGRESS NOTES
"[facility] Note     Patient Name: Tanya Cassidy  Encounter Date: 7/17/25       Have you been cared for by a [facility] Dermatologist in the last 3 years and, if so, which description applies to you? Yes. I have been here within the last 3 years, and my medical history has NOT changed since that time. I am not of child-bearing potential.     REVIEW OF SYSTEMS:  Have you recently had or currently have any of the following? No changes in my recent health.   PAST MEDICAL HISTORY:  Have you personally ever had or currently have any of the following?  If \"YES,\" then please provide more detail. No changes in my medical history.   HISTORY OF IMMUNOSUPPRESSION: Do you have a history of any of the following:  Systemic Immunosuppression such as Diabetes, Biologic or Immunotherapy, Chemotherapy, Organ Transplantation, Bone Marrow Transplantation or Prednisone?  YES, diabetic      Answering \"YES\" requires the addition of the dotphrase \"IMMUNOSUPPRESSED\" as the first diagnosis of the patient's visit.   FAMILY HISTORY:  Any \"first degree relatives\" (parent, brother, sister, or child) with the following?    No changes in my family's known health.   PATIENT EXPERIENCE:    Do you want the Dermatologist to perform a COMPLETE skin exam today including a clinical examination under the \"bra and underwear\" areas?  Yes  If necessary, do we have your permission to call and leave a detailed message on your Preferred Phone number that includes your specific medical information?  Yes      Allergies[1] Current Medications[2]              Whom besides the patient is providing clinical information about today's encounter?   NO ADDITIONAL HISTORIAN (patient alone provided history)    Physical Exam and Assessment/Plan by Diagnosis:      HISTORY OF SQUAMOUS CELL CARCINOMA     Physical Exam:  Anatomic Location Affected:  left upper am-4/2021  Morphological Description of Scar:  well healed  Suspected Recurrence: no  Regional " "adenopathy: no    Additional History of Present Condition:  excision done on left upper arm by dr. Ordoñez on 6/7/21.    Assessment and Plan:  Based on a thorough discussion of this condition and the management approach to it (including a comprehensive discussion of the known risks, side effects and potential benefits of treatment), the patient (family) agrees to implement the following specific plan:  Continue to monitor for changes  When outside we recommend using a wide brim hat, sunglasses, long sleeve and pants, sunscreen with SPF 30+ with reapplication every 2 hours, or SPF specific clothing   Continue with routine skin checks     How can SCC be prevented?  The most important way to prevent SCC is to avoid sunburn. This is especially important in childhood and early life. Fair skinned individuals and those with a personal or family history of BCC should protect their skin from sun exposure daily, year-round and lifelong.  Stay indoors or under the shade in the middle of the day   Wear covering clothing   Apply high protection factor SPF50+ broad-spectrum sunscreens generously to exposed skin if outdoors   Avoid indoor tanning (sun beds, solaria)      What is the outlook for SCC?  Most SCC are cured by treatment. Cure is most likely if treatment is undertaken when the lesion is small. A small percent of SCC's can spread to lymph  nodes and long term monitoring is indicated.   They are also at increased risk of other skin cancers, especially melanoma. Regular self-skin examinations and long-term annual skin checks by an experienced health professional are recommended.       MELANOCYTIC NEVI (\"Moles\")    Physical Exam:  Anatomic Location Affected:   Mostly on sun-exposed areas of the trunk and extremities  Right temple; 0.3 X 0.2 cm gray blue macule; same size as noted on 11/4/21   Morphological Description:  Scattered, 1-4mm round to ovoid, symmetrical-appearing, even bordered, skin colored to dark brown " "macules/papules, mostly in sun-exposed areas  Pertinent Positives:  Pertinent Negatives:    Additional History of Present Condition:      Assessment and Plan:  Based on a thorough discussion of this condition and the management approach to it (including a comprehensive discussion of the known risks, side effects and potential benefits of treatment), the patient (family) agrees to implement the following specific plan:  When outside we recommend using a wide brim hat, sunglasses, long sleeve and pants, sunscreen with SPF 30+ with reapplication every 2 hours, or SPF specific clothing   Benign, reassured  Regular skin check     Melanocytic Nevi  Melanocytic nevi (\"moles\") are tan or brown, raised or flat areas of the skin which have an increased number of melanocytes. Melanocytes are the cells in our body which make pigment and account for skin color.    Some moles are present at birth (I.e., \"congenital nevi\"), while others come up later in life (i.e., \"acquired nevi\").  The sun can stimulate the body to make more moles.  Sunburns are not the only thing that triggers more moles.  Chronic sun exposure can do it too.     Clinically distinguishing a healthy mole from melanoma may be difficult, even for experienced dermatologists. The \"ABCDE's\" of moles have been suggested as a means of helping to alert a person to a suspicious mole and the possible increased risk of melanoma.  The suggestions for raising alert are as follows:    Asymmetry: Healthy moles tend to be symmetric, while melanomas are often asymmetric.  Asymmetry means if you draw a line through the mole, the two halves do not match in color, size, shape, or surface texture. Asymmetry can be a result of rapid enlargement of a mole, the development of a raised area on a previously flat lesion, scaling, ulceration, bleeding or scabbing within the mole.  Any mole that starts to demonstrate \"asymmetry\" should be examined promptly by a board certified dermatologist. " "    Border: Healthy moles tend to have discrete, even borders.  The border of a melanoma often blends into the normal skin and does not sharply delineate the mole from normal skin.  Any mole that starts to demonstrate \"uneven borders\" should be examined promptly by a board certified dermatologist.     Color: Healthy moles tend to be one color throughout.  Melanomas tend to be made up of different colors ranging from dark black, blue, white, or red.  Any mole that demonstrates a color change should be examined promptly by a board certified dermatologist.     Diameter: Healthy moles tend to be smaller than 0.6 cm in size; an exception are \"congenital nevi\" that can be larger.  Melanomas tend to grow and can often be greater than 0.6 cm (1/4 of an inch, or the size of a pencil eraser). This is only a guideline, and many normal moles may be larger than 0.6 cm without being unhealthy.  Any mole that starts to change in size (small to bigger or bigger to smaller) should be examined promptly by a board certified dermatologist.     Evolving: Healthy moles tend to \"stay the same.\"  Melanomas may often show signs of change or evolution such as a change in size, shape, color, or elevation.  Any mole that starts to itch, bleed, crust, burn, hurt, or ulcerate or demonstrate a change or evolution should be examined promptly by a board certified dermatologist.        LENTIGO    Physical Exam:  Anatomic Location Affected:  trunk, arms  Morphological Description:  Light brown macules  Pertinent Positives:  Pertinent Negatives:    Additional History of Present Condition:      Assessment and Plan:  Based on a thorough discussion of this condition and the management approach to it (including a comprehensive discussion of the known risks, side effects and potential benefits of treatment), the patient (family) agrees to implement the following specific plan:  When outside we recommend using a wide brim hat, sunglasses, long sleeve and " pants, sunscreen with SPF 30+ with reapplication every 2 hours, or SPF specific clothing       What is a lentigo?  A lentigo is a pigmented flat or slightly raised lesion with a clearly defined edge. Unlike an ephelis (freckle), it does not fade in the winter months. There are several kinds of lentigo.  The name lentigo originally referred to its appearance resembling a small lentil. The plural of lentigo is lentigines, although “lentigos” is also in common use.    Who gets lentigines?  Lentigines can affect males and females of all ages and races. Solar lentigines are especially prevalent in fair skinned adults. Lentigines associated with syndromes are present at birth or arise during childhood.    What causes lentigines?  Common forms of lentigo are due to exposure to ultraviolet radiation:  Sun damage including sunburn   Indoor tanning   Phototherapy, especially photochemotherapy (PUVA)    Ionizing radiation, eg radiation therapy, can also cause lentigines.  Several familial syndromes associated with widespread lentigines originate from mutations in Malik-MAP kinase, mTOR signaling and PTEN pathways.    What is the treatment for lentigines?  Most lentigines are left alone. Attempts to lighten them may not be successful. The following approaches are used:  SPF 50+ broad-spectrum sunscreen   Hydroquinone bleaching cream   Alpha hydroxy acids   Vitamin C   Retinoids   Azelaic acid   Chemical peels  Individual lesions can be permanently removed using:  Cryotherapy   Intense pulsed light   Pigment lasers    How can lentigines be prevented?  Lentigines associated with exposure ultraviolet radiation can be prevented by very careful sun protection. Clothing is more successful at preventing new lentigines than are sunscreens.    What is the outlook for lentigines?  Lentigines usually persist. They may increase in number with age and sun exposure. Some in sun-protected sites may fade and disappear.    CHERRY  "ANGIOMAS    Physical Exam:  Anatomic Location Affected:  trunk  Morphological Description:  Scattered cherry red, 1-4 mm papules.  Pertinent Positives:  Pertinent Negatives:    Additional History of Present Condition:      Assessment and Plan:  Based on a thorough discussion of this condition and the management approach to it (including a comprehensive discussion of the known risks, side effects and potential benefits of treatment), the patient (family) agrees to implement the following specific plan:  Monitor for changes  Benign, reassured      Assessment and Plan:    Cherry angioma, also known as [place] spots, are benign vascular skin lesions. A \"cherry angioma\" is a firm red, blue or purple papule, 0.1-1 cm in diameter. When thrombosed, they can appear black in colour until evaluated with a dermatoscope when the red or purple colour is more easily seen. Cherry angioma may develop on any part of the body but most often appear on the scalp, face, lips and trunk.  An angioma is due to proliferating endothelial cells; these are the cells that line the inside of a blood vessel.    Angiomas can arise in early life or later in life; the most common type of angioma is a cherry angioma.  Cherry angiomas are very common in males and females of any age or race. They are more noticeable in white skin than in skin of colour. They markedly increase in number from about the age of 40. There may be a family history of similar lesions. Eruptive cherry angiomas have been rarely reported to be associated with internal malignancy. The cause of angiomas is unknown. Genetic analysis of cherry angiomas has shown that they frequently carry specific somatic missense mutations in the GNAQ and GNA11 (Q209H) genes, which are involved in other vascular and melanocytic proliferations.      SEBORRHEIC KERATOSIS; NON-INFLAMED    Physical Exam:  Anatomic Location Affected:  trunk  Morphological Description:  Flat and raised, waxy, " "smooth to warty textured, yellow to brownish-grey to dark brown to blackish, discrete, \"stuck-on\" appearing papules.  Pertinent Positives:  Pertinent Negatives:    Additional History of Present Condition:      Assessment and Plan:  Based on a thorough discussion of this condition and the management approach to it (including a comprehensive discussion of the known risks, side effects and potential benefits of treatment), the patient (family) agrees to implement the following specific plan:  Monitor for changes  Benign, reassured      Seborrheic Keratosis  A seborrheic keratosis is a harmless warty spot that appears during adult life as a common sign of skin aging.  Seborrheic keratoses can arise on any area of skin, covered or uncovered, with the usual exception of the palms and soles. They do not arise from mucous membranes. Seborrheic keratoses can have highly variable appearance.      Seborrheic keratoses are extremely common. It has been estimated that over 90% of adults over the age of 60 years have one or more of them. They occur in males and females of all races, typically beginning to erupt in the 30s or 40s. They are uncommon under the age of 20 years.  The precise cause of seborrhoeic keratoses is not known.  Seborrhoeic keratoses are considered degenerative in nature. As time goes by, seborrheic keratoses tend to become more numerous. Some people inherit a tendency to develop a very large number of them; some people may have hundreds of them.      There is no easy way to remove multiple lesions on a single occasion.  Unless a specific lesion is \"inflamed\" and is causing pain or stinging/burning or is bleeding, most insurance companies do not authorize treatment.    XEROSIS (\"DRY SKIN\")    Physical Exam:  Anatomic Location Affected:  diffuse  Morphological Description:  xerosis  Pertinent Positives:  Pertinent Negatives:    Additional History of Present Condition:      Assessment and Plan:  Based on a " thorough discussion of this condition and the management approach to it (including a comprehensive discussion of the known risks, side effects and potential benefits of treatment), the patient (family) agrees to implement the following specific plan:  Use moisturizer like Eucerin,Cerave or Aveeno Cream 3 times a day for the dry skin            Dry skin refers to skin that feels dry to touch. Dry skin has a dull surface with a rough, scaly quality. The skin is less pliable and cracked. When dryness is severe, the skin may become inflamed and fissured.  Although any body site can be dry, dry skin tends to affect the shins more than any other site.    Dry skin is lacking moisture in the outer horny cell layer (stratum corneum) and this results in cracks in the skin surface.  Dry skin is also called xerosis, xeroderma or asteatosis (lack of fat).  It can affect males and females of all ages. There is some racial variability in water and lipid content of the skin.  Dry skin that starts in early childhood may be one of about 20 types of ichthyosis (fish-scale skin). There is often a family history of dry skin.   Dry skin is commonly seen in people with atopic dermatitis.  Nearly everyone > 60 years has dry skin.    Dry skin that begins later may be seen in people with certain diseases and conditions.  Postmenopausal women  Hypothyroidism  Chronic renal disease   Malnutrition and weight loss   Subclinical dermatitis   Treatment with certain drugs such as oral retinoids, diuretics and epidermal growth factor receptor inhibitors      What is the treatment for dry skin?  The mainstay of treatment of dry skin and ichthyosis is moisturisers/emollients. They should be applied liberally and often enough to:  Reduce itch   Improve the barrier function   Prevent entry of irritants, bacteria   Reduce transepidermal water loss.      How can dry skin be prevented?  Eliminate aggravating factors:  Reduce the frequency of bathing.   A  humidifier in winter and air conditioner in summer   Compare having a short shower with a prolonged soak in a bath.   Use lukewarm, not hot, water.   Replace standard soap with a substitute such as a synthetic detergent cleanser, water-miscible emollient, bath oil, anti-pruritic tar oil, colloidal oatmeal etc.   Apply an emollient liberally and often, particularly shortly after bathing, and when itchy. The drier the skin, the thicker this should be, especially on the hands.    What is the outlook for dry skin?  A tendency to dry skin may persist life-long, or it may improve once contributing factors are controlled.    Scribe Attestation      I,:  Emilia Still MA am acting as a scribe while in the presence of the attending physician.:       I,:  Albertina Ordoñez MD personally performed the services described in this documentation    as scribed in my presence.:                  [1]   Allergies  Allergen Reactions    Sulfa Antibiotics Rash    Penicillins Rash   [2]   Current Outpatient Medications:     acetaminophen (TYLENOL 8 HOUR) 650 mg CR tablet, Take 1 tablet (650 mg total) by mouth every 8 (eight) hours, Disp: 15 tablet, Rfl: 0    amLODIPine (NORVASC) 10 mg tablet, Take 10 mg by mouth in the morning., Disp: , Rfl:     calcium citrate-vitamin D (CITRACAL+D) 315-200 MG-UNIT per tablet, Take 1 tablet by mouth in the morning., Disp: , Rfl:     Cholecalciferol (Vitamin D3) 50 MCG (2000 UT) CHEW, Chew 4,000 Units in the morning., Disp: , Rfl:     doxycycline hyclate (VIBRAMYCIN) 100 mg capsule, Take 100 mg by mouth in the morning and 100 mg in the evening. NOT Taking., Disp: , Rfl:     FIBER ADULT GUMMIES PO, Take 3 gums by mouth in the morning., Disp: , Rfl:     furosemide (LASIX) 40 mg tablet, Take 40 mg by mouth in the morning., Disp: , Rfl:     Lancets (OneTouch Delica Plus [id number]) MISC, , Disp: , Rfl:     lidocaine (LIDODERM) 5 %, Apply 1 patch topically daily for 15 days Remove & Discard patch within 12 hours or  as directed by MD (Patient not taking: Reported on 3/24/2025), Disp: 15 patch, Rfl: 0    losartan (COZAAR) 100 MG tablet, Take 100 mg by mouth in the morning., Disp: , Rfl:     metFORMIN (GLUCOPHAGE) 500 mg tablet, Take 500 mg by mouth in the morning and 500 mg in the evening. Take with meals., Disp: , Rfl:     Multiple Vitamin (MULTIVITAMIN) tablet, Take 1 tablet by mouth in the morning., Disp: , Rfl:     mupirocin (BACTROBAN) 2 % ointment, APPLY A SMALL AMOUNT TO THE AFFECTED AREA THREE TIMES DAILY (Patient not taking: No sig reported), Disp: , Rfl:     OneTouch Verio test strip, , Disp: , Rfl:     pantoprazole (PROTONIX) 40 mg tablet, Take 1 tablet (40 mg total) by mouth daily in the early morning (Patient not taking: Reported on 3/24/2025), Disp: 30 tablet, Rfl: 0    SYNTHROID 88 MCG tablet, Take 88 mcg by mouth in the morning., Disp: , Rfl:     traMADol (Ultram) 50 mg tablet, Take 1 tablet (50 mg total) by mouth every 6 (six) hours as needed for severe pain (Patient not taking: Reported on 11/27/2024), Disp: 9 tablet, Rfl: 0

## 2025-07-17 NOTE — PATIENT INSTRUCTIONS
"HISTORY OF SQUAMOUS CELL CARCINOMA     Physical Exam:  Anatomic Location Affected:  left upper am-4/2021  Morphological Description of Scar:  well healed  Suspected Recurrence: no  Regional adenopathy: no    Assessment and Plan:  Based on a thorough discussion of this condition and the management approach to it (including a comprehensive discussion of the known risks, side effects and potential benefits of treatment), the patient (family) agrees to implement the following specific plan:  Continue to monitor for changes  When outside we recommend using a wide brim hat, sunglasses, long sleeve and pants, sunscreen with SPF 30+ with reapplication every 2 hours, or SPF specific clothing   Continue with routine skin checks     How can SCC be prevented?  The most important way to prevent SCC is to avoid sunburn. This is especially important in childhood and early life. Fair skinned individuals and those with a personal or family history of BCC should protect their skin from sun exposure daily, year-round and lifelong.  Stay indoors or under the shade in the middle of the day   Wear covering clothing   Apply high protection factor SPF50+ broad-spectrum sunscreens generously to exposed skin if outdoors   Avoid indoor tanning (sun beds, solaria)      What is the outlook for SCC?  Most SCC are cured by treatment. Cure is most likely if treatment is undertaken when the lesion is small. A small percent of SCC's can spread to lymph  nodes and long term monitoring is indicated.   They are also at increased risk of other skin cancers, especially melanoma. Regular self-skin examinations and long-term annual skin checks by an experienced health professional are recommended.       MELANOCYTIC NEVI (\"Moles\")    Physical Exam:  Anatomic Location Affected:   Mostly on sun-exposed areas of the trunk and extremities  Right temple; 0.3 X 0.2 cm gray blue macule; same size as noted on 11/4/21   Morphological Description:  Scattered, 1-4mm " "round to ovoid, symmetrical-appearing, even bordered, skin colored to dark brown macules/papules, mostly in sun-exposed areas  Pertinent Positives:  Pertinent Negatives:    Additional History of Present Condition:      Assessment and Plan:  Based on a thorough discussion of this condition and the management approach to it (including a comprehensive discussion of the known risks, side effects and potential benefits of treatment), the patient (family) agrees to implement the following specific plan:  When outside we recommend using a wide brim hat, sunglasses, long sleeve and pants, sunscreen with SPF 30+ with reapplication every 2 hours, or SPF specific clothing   Benign, reassured  Regular skin check     Melanocytic Nevi  Melanocytic nevi (\"moles\") are tan or brown, raised or flat areas of the skin which have an increased number of melanocytes. Melanocytes are the cells in our body which make pigment and account for skin color.    Some moles are present at birth (I.e., \"congenital nevi\"), while others come up later in life (i.e., \"acquired nevi\").  The sun can stimulate the body to make more moles.  Sunburns are not the only thing that triggers more moles.  Chronic sun exposure can do it too.     Clinically distinguishing a healthy mole from melanoma may be difficult, even for experienced dermatologists. The \"ABCDE's\" of moles have been suggested as a means of helping to alert a person to a suspicious mole and the possible increased risk of melanoma.  The suggestions for raising alert are as follows:    Asymmetry: Healthy moles tend to be symmetric, while melanomas are often asymmetric.  Asymmetry means if you draw a line through the mole, the two halves do not match in color, size, shape, or surface texture. Asymmetry can be a result of rapid enlargement of a mole, the development of a raised area on a previously flat lesion, scaling, ulceration, bleeding or scabbing within the mole.  Any mole that starts to " "demonstrate \"asymmetry\" should be examined promptly by a board certified dermatologist.     Border: Healthy moles tend to have discrete, even borders.  The border of a melanoma often blends into the normal skin and does not sharply delineate the mole from normal skin.  Any mole that starts to demonstrate \"uneven borders\" should be examined promptly by a board certified dermatologist.     Color: Healthy moles tend to be one color throughout.  Melanomas tend to be made up of different colors ranging from dark black, blue, white, or red.  Any mole that demonstrates a color change should be examined promptly by a board certified dermatologist.     Diameter: Healthy moles tend to be smaller than 0.6 cm in size; an exception are \"congenital nevi\" that can be larger.  Melanomas tend to grow and can often be greater than 0.6 cm (1/4 of an inch, or the size of a pencil eraser). This is only a guideline, and many normal moles may be larger than 0.6 cm without being unhealthy.  Any mole that starts to change in size (small to bigger or bigger to smaller) should be examined promptly by a board certified dermatologist.     Evolving: Healthy moles tend to \"stay the same.\"  Melanomas may often show signs of change or evolution such as a change in size, shape, color, or elevation.  Any mole that starts to itch, bleed, crust, burn, hurt, or ulcerate or demonstrate a change or evolution should be examined promptly by a board certified dermatologist.        LENTIGO    Physical Exam:  Anatomic Location Affected:  trunk, arms  Morphological Description:  Light brown macules  Pertinent Positives:  Pertinent Negatives:    Additional History of Present Condition:      Assessment and Plan:  Based on a thorough discussion of this condition and the management approach to it (including a comprehensive discussion of the known risks, side effects and potential benefits of treatment), the patient (family) agrees to implement the following " specific plan:  When outside we recommend using a wide brim hat, sunglasses, long sleeve and pants, sunscreen with SPF 30+ with reapplication every 2 hours, or SPF specific clothing       What is a lentigo?  A lentigo is a pigmented flat or slightly raised lesion with a clearly defined edge. Unlike an ephelis (freckle), it does not fade in the winter months. There are several kinds of lentigo.  The name lentigo originally referred to its appearance resembling a small lentil. The plural of lentigo is lentigines, although “lentigos” is also in common use.    Who gets lentigines?  Lentigines can affect males and females of all ages and races. Solar lentigines are especially prevalent in fair skinned adults. Lentigines associated with syndromes are present at birth or arise during childhood.    What causes lentigines?  Common forms of lentigo are due to exposure to ultraviolet radiation:  Sun damage including sunburn   Indoor tanning   Phototherapy, especially photochemotherapy (PUVA)    Ionizing radiation, eg radiation therapy, can also cause lentigines.  Several familial syndromes associated with widespread lentigines originate from mutations in Malik-MAP kinase, mTOR signaling and PTEN pathways.    What is the treatment for lentigines?  Most lentigines are left alone. Attempts to lighten them may not be successful. The following approaches are used:  SPF 50+ broad-spectrum sunscreen   Hydroquinone bleaching cream   Alpha hydroxy acids   Vitamin C   Retinoids   Azelaic acid   Chemical peels  Individual lesions can be permanently removed using:  Cryotherapy   Intense pulsed light   Pigment lasers    How can lentigines be prevented?  Lentigines associated with exposure ultraviolet radiation can be prevented by very careful sun protection. Clothing is more successful at preventing new lentigines than are sunscreens.    What is the outlook for lentigines?  Lentigines usually persist. They may increase in number with age and  "sun exposure. Some in sun-protected sites may fade and disappear.    ROBLEDO ANGIOMAS    Physical Exam:  Anatomic Location Affected:  trunk  Morphological Description:  Scattered cherry red, 1-4 mm papules.  Pertinent Positives:  Pertinent Negatives:    Additional History of Present Condition:      Assessment and Plan:  Based on a thorough discussion of this condition and the management approach to it (including a comprehensive discussion of the known risks, side effects and potential benefits of treatment), the patient (family) agrees to implement the following specific plan:  Monitor for changes  Benign, reassured      Assessment and Plan:    Cherry angioma, also known as [place] spots, are benign vascular skin lesions. A \"cherry angioma\" is a firm red, blue or purple papule, 0.1-1 cm in diameter. When thrombosed, they can appear black in colour until evaluated with a dermatoscope when the red or purple colour is more easily seen. Cherry angioma may develop on any part of the body but most often appear on the scalp, face, lips and trunk.  An angioma is due to proliferating endothelial cells; these are the cells that line the inside of a blood vessel.    Angiomas can arise in early life or later in life; the most common type of angioma is a cherry angioma.  Cherry angiomas are very common in males and females of any age or race. They are more noticeable in white skin than in skin of colour. They markedly increase in number from about the age of 40. There may be a family history of similar lesions. Eruptive cherry angiomas have been rarely reported to be associated with internal malignancy. The cause of angiomas is unknown. Genetic analysis of cherry angiomas has shown that they frequently carry specific somatic missense mutations in the GNAQ and GNA11 (Q209H) genes, which are involved in other vascular and melanocytic proliferations.      SEBORRHEIC KERATOSIS; NON-INFLAMED    Physical Exam:  Anatomic " "Location Affected:  trunk  Morphological Description:  Flat and raised, waxy, smooth to warty textured, yellow to brownish-grey to dark brown to blackish, discrete, \"stuck-on\" appearing papules.  Pertinent Positives:  Pertinent Negatives:    Additional History of Present Condition:      Assessment and Plan:  Based on a thorough discussion of this condition and the management approach to it (including a comprehensive discussion of the known risks, side effects and potential benefits of treatment), the patient (family) agrees to implement the following specific plan:  Monitor for changes  Benign, reassured      Seborrheic Keratosis  A seborrheic keratosis is a harmless warty spot that appears during adult life as a common sign of skin aging.  Seborrheic keratoses can arise on any area of skin, covered or uncovered, with the usual exception of the palms and soles. They do not arise from mucous membranes. Seborrheic keratoses can have highly variable appearance.      Seborrheic keratoses are extremely common. It has been estimated that over 90% of adults over the age of 60 years have one or more of them. They occur in males and females of all races, typically beginning to erupt in the 30s or 40s. They are uncommon under the age of 20 years.  The precise cause of seborrhoeic keratoses is not known.  Seborrhoeic keratoses are considered degenerative in nature. As time goes by, seborrheic keratoses tend to become more numerous. Some people inherit a tendency to develop a very large number of them; some people may have hundreds of them.      There is no easy way to remove multiple lesions on a single occasion.  Unless a specific lesion is \"inflamed\" and is causing pain or stinging/burning or is bleeding, most insurance companies do not authorize treatment.    XEROSIS (\"DRY SKIN\")    Physical Exam:  Anatomic Location Affected:  diffuse  Morphological Description:  xerosis  Pertinent Positives:  Pertinent " Negatives:    Additional History of Present Condition:      Assessment and Plan:  Based on a thorough discussion of this condition and the management approach to it (including a comprehensive discussion of the known risks, side effects and potential benefits of treatment), the patient (family) agrees to implement the following specific plan:  Use moisturizer like Eucerin,Cerave or Aveeno Cream 3 times a day for the dry skin            Dry skin refers to skin that feels dry to touch. Dry skin has a dull surface with a rough, scaly quality. The skin is less pliable and cracked. When dryness is severe, the skin may become inflamed and fissured.  Although any body site can be dry, dry skin tends to affect the shins more than any other site.    Dry skin is lacking moisture in the outer horny cell layer (stratum corneum) and this results in cracks in the skin surface.  Dry skin is also called xerosis, xeroderma or asteatosis (lack of fat).  It can affect males and females of all ages. There is some racial variability in water and lipid content of the skin.  Dry skin that starts in early childhood may be one of about 20 types of ichthyosis (fish-scale skin). There is often a family history of dry skin.   Dry skin is commonly seen in people with atopic dermatitis.  Nearly everyone > 60 years has dry skin.    Dry skin that begins later may be seen in people with certain diseases and conditions.  Postmenopausal women  Hypothyroidism  Chronic renal disease   Malnutrition and weight loss   Subclinical dermatitis   Treatment with certain drugs such as oral retinoids, diuretics and epidermal growth factor receptor inhibitors      What is the treatment for dry skin?  The mainstay of treatment of dry skin and ichthyosis is moisturisers/emollients. They should be applied liberally and often enough to:  Reduce itch   Improve the barrier function   Prevent entry of irritants, bacteria   Reduce transepidermal water loss.      How can  dry skin be prevented?  Eliminate aggravating factors:  Reduce the frequency of bathing.   A humidifier in winter and air conditioner in summer   Compare having a short shower with a prolonged soak in a bath.   Use lukewarm, not hot, water.   Replace standard soap with a substitute such as a synthetic detergent cleanser, water-miscible emollient, bath oil, anti-pruritic tar oil, colloidal oatmeal etc.   Apply an emollient liberally and often, particularly shortly after bathing, and when itchy. The drier the skin, the thicker this should be, especially on the hands.    What is the outlook for dry skin?  A tendency to dry skin may persist life-long, or it may improve once contributing factors are controlled.

## (undated) DEVICE — GLOVE INDICATOR PI UNDERGLOVE SZ 8 BLUE

## (undated) DEVICE — TUBING SUCTION 5MM X 12 FT

## (undated) DEVICE — NEEDLE 25G X 1 1/2

## (undated) DEVICE — MEDI-VAC YANKAUER SUCTION HANDLE W/BULBOUS AND CONTROL VENT: Brand: CARDINAL HEALTH

## (undated) DEVICE — SPONGE PVP SCRUB WING STERILE

## (undated) DEVICE — DRESSING XEROFORM 5 X 9

## (undated) DEVICE — DISPOSABLE EQUIPMENT COVER: Brand: SMALL TOWEL DRAPE

## (undated) DEVICE — GLOVE SRG BIOGEL 6

## (undated) DEVICE — PADDING,UNDERCAST,COTTON, 4"X4YD STERILE: Brand: MEDLINE

## (undated) DEVICE — DRAPE SHEET THREE QUARTER

## (undated) DEVICE — INTENDED FOR TISSUE SEPARATION, AND OTHER PROCEDURES THAT REQUIRE A SHARP SURGICAL BLADE TO PUNCTURE OR CUT.: Brand: BARD-PARKER SAFETY BLADES SIZE 15, STERILE

## (undated) DEVICE — SUPER SPONGES,MEDIUM: Brand: KERLIX

## (undated) DEVICE — SCD SEQUENTIAL COMPRESSION COMFORT SLEEVE MEDIUM KNEE LENGTH: Brand: KENDALL SCD

## (undated) DEVICE — PLUMEPEN PRO 10FT

## (undated) DEVICE — BETHLEHEM UNIVERSAL MINOR GEN: Brand: CARDINAL HEALTH

## (undated) DEVICE — SUT MONOCRYL 4-0 PS-2 27 IN Y426H

## (undated) DEVICE — SUT SILK 2-0 SH 30 IN K833H

## (undated) DEVICE — GLOVE SRG BIOGEL 7.5

## (undated) DEVICE — GAUZE SPONGES,USP TYPE VII GAUZE, 12 PLY: Brand: CURITY

## (undated) DEVICE — 3M™ STERI-STRIP™ REINFORCED ADHESIVE SKIN CLOSURES, R1541, 1/4 IN X 3 IN (6 MM X 75 MM), 3 STRIPS/ENVELOPE: Brand: 3M™ STERI-STRIP™

## (undated) DEVICE — SUT PROLENE 4-0 PS-2 18 IN 8682G

## (undated) DEVICE — ACE WRAP 4 IN STERILE

## (undated) DEVICE — SUT MONOCRYL 3-0 SH 27 IN Y416H

## (undated) DEVICE — CHLORAPREP HI-LITE 26ML ORANGE

## (undated) DEVICE — GAUZE SPONGES,16 PLY: Brand: CURITY

## (undated) DEVICE — DRAPE EQUIPMENT RF WAND

## (undated) DEVICE — EXOFIN PRECISION PEN HIGH VISCOSITY TOPICAL SKIN ADHESIVE: Brand: EXOFIN PRECISION PEN, 1G

## (undated) DEVICE — STERILE BETHLEHEM PLASTIC HAND: Brand: CARDINAL HEALTH

## (undated) DEVICE — ACE WRAP 2 IN STERILE

## (undated) DEVICE — WEBRIL 6 IN UNSTERILE

## (undated) DEVICE — OCCLUSIVE GAUZE STRIP,3% BISMUTH TRIBROMOPHENATE IN PETROLATUM BLEND: Brand: XEROFORM

## (undated) DEVICE — LIGACLIP MCA MULTIPLE CLIP APPLIERS, 20 SMALL CLIPS: Brand: LIGACLIP